# Patient Record
Sex: FEMALE | Race: BLACK OR AFRICAN AMERICAN | NOT HISPANIC OR LATINO | Employment: FULL TIME | URBAN - METROPOLITAN AREA
[De-identification: names, ages, dates, MRNs, and addresses within clinical notes are randomized per-mention and may not be internally consistent; named-entity substitution may affect disease eponyms.]

---

## 2017-01-18 ENCOUNTER — ANESTHESIA EVENT (OUTPATIENT)
Dept: ENDOSCOPY | Facility: HOSPITAL | Age: 57
End: 2017-01-18
Payer: COMMERCIAL

## 2017-01-18 ENCOUNTER — TELEPHONE (OUTPATIENT)
Dept: INTERVENTIONAL RADIOLOGY/VASCULAR | Facility: HOSPITAL | Age: 57
End: 2017-01-18

## 2017-01-18 DIAGNOSIS — Q27.30 ARTERIOVENOUS MALFORMATION: Primary | ICD-10-CM

## 2017-01-18 NOTE — TELEPHONE ENCOUNTER
Phone call (complete )   Arrival time-   530     NPO reinforced  Allergies reviewed  Directions given  Instructed to take meds in AM  Has Ride ( )             Labs (to be ordered  )      Approx recovery length discussed

## 2017-01-19 ENCOUNTER — ANESTHESIA (OUTPATIENT)
Dept: ENDOSCOPY | Facility: HOSPITAL | Age: 57
End: 2017-01-19
Payer: COMMERCIAL

## 2017-01-19 ENCOUNTER — SURGERY (OUTPATIENT)
Age: 57
End: 2017-01-19

## 2017-01-19 PROCEDURE — 25000003 PHARM REV CODE 250: Performed by: NURSE ANESTHETIST, CERTIFIED REGISTERED

## 2017-01-19 PROCEDURE — D9220A PRA ANESTHESIA: Mod: CRNA,,, | Performed by: NURSE ANESTHETIST, CERTIFIED REGISTERED

## 2017-01-19 PROCEDURE — D9220A PRA ANESTHESIA: Mod: ANES,,, | Performed by: ANESTHESIOLOGY

## 2017-01-19 PROCEDURE — 63600175 PHARM REV CODE 636 W HCPCS: Performed by: RADIOLOGY

## 2017-01-19 PROCEDURE — 63600175 PHARM REV CODE 636 W HCPCS: Performed by: NURSE ANESTHETIST, CERTIFIED REGISTERED

## 2017-01-19 RX ORDER — LIDOCAINE HCL/PF 100 MG/5ML
SYRINGE (ML) INTRAVENOUS
Status: DISCONTINUED | OUTPATIENT
Start: 2017-01-19 | End: 2017-01-19

## 2017-01-19 RX ORDER — SUCCINYLCHOLINE CHLORIDE 20 MG/ML
INJECTION INTRAMUSCULAR; INTRAVENOUS
Status: DISCONTINUED | OUTPATIENT
Start: 2017-01-19 | End: 2017-01-19

## 2017-01-19 RX ORDER — PROPOFOL 10 MG/ML
VIAL (ML) INTRAVENOUS
Status: DISCONTINUED | OUTPATIENT
Start: 2017-01-19 | End: 2017-01-19

## 2017-01-19 RX ORDER — FENTANYL CITRATE 50 UG/ML
INJECTION, SOLUTION INTRAMUSCULAR; INTRAVENOUS
Status: DISCONTINUED | OUTPATIENT
Start: 2017-01-19 | End: 2017-01-19

## 2017-01-19 RX ORDER — ONDANSETRON 2 MG/ML
INJECTION INTRAMUSCULAR; INTRAVENOUS
Status: DISCONTINUED | OUTPATIENT
Start: 2017-01-19 | End: 2017-01-19

## 2017-01-19 RX ORDER — MIDAZOLAM HYDROCHLORIDE 1 MG/ML
INJECTION, SOLUTION INTRAMUSCULAR; INTRAVENOUS
Status: DISCONTINUED | OUTPATIENT
Start: 2017-01-19 | End: 2017-01-19

## 2017-01-19 RX ORDER — DEXMEDETOMIDINE HYDROCHLORIDE 100 UG/ML
INJECTION, SOLUTION INTRAVENOUS
Status: DISCONTINUED | OUTPATIENT
Start: 2017-01-19 | End: 2017-01-19

## 2017-01-19 RX ORDER — ROCURONIUM BROMIDE 10 MG/ML
INJECTION, SOLUTION INTRAVENOUS
Status: DISCONTINUED | OUTPATIENT
Start: 2017-01-19 | End: 2017-01-19

## 2017-01-19 RX ORDER — GLYCOPYRROLATE 0.2 MG/ML
INJECTION INTRAMUSCULAR; INTRAVENOUS
Status: DISCONTINUED | OUTPATIENT
Start: 2017-01-19 | End: 2017-01-19

## 2017-01-19 RX ORDER — NEOSTIGMINE METHYLSULFATE 1 MG/ML
INJECTION, SOLUTION INTRAVENOUS
Status: DISCONTINUED | OUTPATIENT
Start: 2017-01-19 | End: 2017-01-19

## 2017-01-19 RX ORDER — PHENYLEPHRINE HYDROCHLORIDE 10 MG/ML
INJECTION INTRAVENOUS
Status: DISCONTINUED | OUTPATIENT
Start: 2017-01-19 | End: 2017-01-19

## 2017-01-19 RX ADMIN — FENTANYL CITRATE 100 MCG: 50 INJECTION, SOLUTION INTRAMUSCULAR; INTRAVENOUS at 08:01

## 2017-01-19 RX ADMIN — PHENYLEPHRINE HYDROCHLORIDE 100 MCG: 10 INJECTION INTRAVENOUS at 09:01

## 2017-01-19 RX ADMIN — ROCURONIUM BROMIDE 15 MG: 10 INJECTION, SOLUTION INTRAVENOUS at 08:01

## 2017-01-19 RX ADMIN — SUCCINYLCHOLINE CHLORIDE 140 MG: 20 INJECTION, SOLUTION INTRAMUSCULAR; INTRAVENOUS at 08:01

## 2017-01-19 RX ADMIN — LIDOCAINE HYDROCHLORIDE 100 MG: 20 INJECTION, SOLUTION INTRAVENOUS at 08:01

## 2017-01-19 RX ADMIN — PHENYLEPHRINE HYDROCHLORIDE 150 MCG: 10 INJECTION INTRAVENOUS at 08:01

## 2017-01-19 RX ADMIN — PHENYLEPHRINE HYDROCHLORIDE 100 MCG: 10 INJECTION INTRAVENOUS at 08:01

## 2017-01-19 RX ADMIN — DEXMEDETOMIDINE HYDROCHLORIDE 0.5 MCG/KG/HR: 100 INJECTION, SOLUTION, CONCENTRATE INTRAVENOUS at 08:01

## 2017-01-19 RX ADMIN — NEOSTIGMINE METHYLSULFATE 3 MG: 1 INJECTION INTRAVENOUS at 10:01

## 2017-01-19 RX ADMIN — PHENYLEPHRINE HYDROCHLORIDE 200 MCG: 10 INJECTION INTRAVENOUS at 10:01

## 2017-01-19 RX ADMIN — ONDANSETRON 4 MG: 2 INJECTION INTRAMUSCULAR; INTRAVENOUS at 10:01

## 2017-01-19 RX ADMIN — PHENYLEPHRINE HYDROCHLORIDE 200 MCG: 10 INJECTION INTRAVENOUS at 09:01

## 2017-01-19 RX ADMIN — PROPOFOL 50 MG: 10 INJECTION, EMULSION INTRAVENOUS at 08:01

## 2017-01-19 RX ADMIN — ROCURONIUM BROMIDE 5 MG: 10 INJECTION, SOLUTION INTRAVENOUS at 08:01

## 2017-01-19 RX ADMIN — GLYCOPYRROLATE 0.4 MG: 0.2 INJECTION, SOLUTION INTRAMUSCULAR; INTRAVENOUS at 10:01

## 2017-01-19 RX ADMIN — PHENYLEPHRINE HYDROCHLORIDE 200 MCG: 10 INJECTION INTRAVENOUS at 08:01

## 2017-01-19 RX ADMIN — PROPOFOL 150 MG: 10 INJECTION, EMULSION INTRAVENOUS at 08:01

## 2017-01-19 RX ADMIN — MIDAZOLAM HYDROCHLORIDE 2 MG: 1 INJECTION, SOLUTION INTRAMUSCULAR; INTRAVENOUS at 07:01

## 2017-01-19 RX ADMIN — DEXMEDETOMIDINE HYDROCHLORIDE 50 MCG: 100 INJECTION, SOLUTION, CONCENTRATE INTRAVENOUS at 08:01

## 2017-01-19 RX ADMIN — CEFTRIAXONE 1 G: 1 INJECTION, SOLUTION INTRAVENOUS at 08:01

## 2017-01-19 NOTE — ANESTHESIA RELEASE NOTE
Anesthesia Release from PACU Note    Patient: Cydney Salazar    Procedure(s) Performed: Procedure(s) (LRB):  EMBOLIZATION (N/A)    Anesthesia type: general    Post pain: Adequate analgesia    Post assessment: no apparent anesthetic complications, tolerated procedure well and no evidence of recall    Last Vitals:   Visit Vitals    BP (!) 167/87    Pulse 86    Temp 36.7 °C (98.1 °F) (Oral)    Resp 16    SpO2 (!) 94%    Breastfeeding No       Post vital signs: stable    Level of consciousness: awake, alert  and oriented    Nausea/Vomiting: no nausea/no vomiting    Complications: none    Airway Patency: patent    Respiratory: unassisted    Cardiovascular: stable and blood pressure at baseline    Hydration: euvolemic

## 2017-01-19 NOTE — ANESTHESIA POSTPROCEDURE EVALUATION
Anesthesia Post Evaluation    Patient: Cydney Salazar    Procedure(s) Performed: Procedure(s) (LRB):  EMBOLIZATION (N/A)    Final Anesthesia Type: general  Patient location during evaluation: PACU  Patient participation: Yes- Able to Participate  Level of consciousness: awake and alert  Post-procedure vital signs: reviewed and stable  Pain management: adequate  Airway patency: patent  PONV status at discharge: No PONV  Anesthetic complications: no      Cardiovascular status: hemodynamically stable  Respiratory status: unassisted, spontaneous ventilation and room air  Hydration status: euvolemic  Follow-up not needed.        Visit Vitals    BP (!) 167/87    Pulse 86    Temp 36.7 °C (98.1 °F) (Oral)    Resp 16    SpO2 (!) 94%    Breastfeeding No       Pain/Blake Score: Pain Assessment Performed: Yes (1/19/2017  2:30 PM)  Presence of Pain: denies (1/19/2017  2:30 PM)  Pain Rating Prior to Med Admin: 0 (1/19/2017  2:30 PM)  Blake Score: 10 (1/19/2017  2:30 PM)

## 2017-01-19 NOTE — ANESTHESIA PREPROCEDURE EVALUATION
01/19/2017  Cydney Salazar is a 57 y.o., female.    [facility] Anesthesia Evaluation         Review of Systems      Physical Exam  General:  Well nourished    Airway/Jaw/Neck:  Airway Findings: Mouth Opening: Normal Tongue: Normal  General Airway Assessment: Adult  Mallampati: II  Improves to II with phonation.  TM Distance: Normal, at least 6 cm      Dental:  Dental Findings: In tact   Chest/Lungs:  Chest/Lungs Findings: Clear to auscultation     Heart/Vascular:  Heart Findings: Rate: Normal  Rhythm: Regular Rhythm  Sounds: Normal        Mental Status:  Mental Status Findings:  Cooperative, Alert and Oriented         Anesthesia Plan  Type of Anesthesia, risks & benefits discussed:  Anesthesia Type:  MAC  Patient's Preference: Sedation plan explained with possible need for general discussed. Patient voiced understanding.  Intra-op Monitoring Plan:   Intra-op Monitoring Plan Comments:   Post Op Pain Control Plan:   Post Op Pain Control Plan Comments: Per primary service.   Induction:   IV  Beta Blocker:  Patient is not currently on a Beta-Blocker (No further documentation required).       Informed Consent: Patient understands risks and agrees with Anesthesia plan.  Questions answered. Anesthesia consent signed with patient.  ASA Score: 3     Day of Surgery Review of History & Physical:    H&P update referred to the surgeon.         Ready For Surgery From Anesthesia Perspective.     Patient Active Problem List   Diagnosis    Sarcoidosis    Hyperlipidemia    Obesity    Osteopenia    KAREY (obstructive sleep apnea)    Pre-diabetes    Essential hypertension    Hemoptysis    Arterio-venous malformation

## 2017-01-19 NOTE — TRANSFER OF CARE
Anesthesia Transfer of Care Note    Patient: Cydney Salazar    Procedure(s) Performed: Procedure(s) (LRB):  EMBOLIZATION (N/A)    Patient location: PACU    Anesthesia Type: general    Transport from OR: Transported from OR on 6-10 L/min O2 by face mask with adequate spontaneous ventilation    Post pain: adequate analgesia    Post assessment: no apparent anesthetic complications and tolerated procedure well    Post vital signs: stable    Level of consciousness: awake    Nausea/Vomiting: no nausea/vomiting    Complications: none          Last vitals:   Visit Vitals    BP (!) 125/58    Pulse (!) 59    Temp 35.9 °C (96.6 °F) (Axillary)    Resp 16    SpO2 100%    Breastfeeding No

## 2017-01-20 ENCOUNTER — DOCUMENTATION ONLY (OUTPATIENT)
Dept: INTERNAL MEDICINE | Facility: CLINIC | Age: 57
End: 2017-01-20

## 2017-01-20 NOTE — PROGRESS NOTES
Pre-Visit Review & Summary:    56 y/o female, with hx of HTN, Pulmonary Sarcoidosis in Remission, HPL, Obesity, Pre-Diabetes, KAREY, Osteopenia, OA Right Knee, and Glaucoma, has a scheduled appt 17 for 2 month follow up and BP check.   since her last visit, she was evaluated by Pulmonary for recurrent episodes of hemoptysis. CTA Chest 2016 revealed an AV Malformation of Right Middle Lobe. She had AV Embolization 17 without complication.     Her BP 16 was noted to be 170/90 and Losartan was increased to 100 mg/day and Dyazide was continued.      PMH:  1. HTN           Triamterene-HCT 37.5-25 mg/day           Losartan 100 mg/day    2. Hx of Pulmonary Sarcoidosis           Dx ~  with 8 yr hx of Steroid Rx - discontinued ~           Followed in Pulmonary Clinic (Dr. Aguilera) - last visit 16           Currently on no meds - in Remission           PFS (2015): Decrease Vital Capacity 57%, Decrease FEV1 60%,             CXR (2016): unchanged from , mild hilar prominence with (B) interstitial changes           CTA Chest (2016): AV Malformation Right Middle Lobe            S/P AV Embolization RML (17)    3. HPL           Lipitor 20 mg hs     4. Obesity ( BMI-33)    5. Osteopenia           BMD (2015): T Score (-)1.5    6. KAREY            CPAP           Snoring and Daytime Hypersomnolence           (+) Sleep Study 2015    7. Glaucoma    8. Seasonal Allergies           Flonase Nasal Spray prn    9. Pre-DM      10. OA Right Knee           X-Ray R-Knee (2015): moderate DJD           NSAIDS and Physical Rx recommended by Dr. Craig        MEDS:          Albuterol Inhaler prn          Clobetasol 0.05% ointment          Combigan Eye Drops          Flonase Nasal Spray          Lipitor 20 mg hs          Losartan 100 mg/day         Triamterene-HCT 37.5-25 mg/day      ALLERGIES:          NKA      Surgical Hx:                  BTL         S/P AV Embolization RML (17)    Social  Hx:          Smoking Hx: Non-Smoker          ETOH: None          Exercise: Walking 1 hour 5 days a week.            Work Hx: [employer]          Home Environment: , 1 Adult child          Postmenopausal - Sexually Active      Family Hx:           (+) Glaucoma - Mother           (+) DM - Daughter      Preventive Health Screening & Recent Lab Results:           Annual PE: 8/5/15           CBC (5/2016): normal           CMP (5/2016): normal           TSH (6/2015): 2.553           Lipids (6/2015): Chol-229, TG-170, HDL-42, LDL-153 (ASCVD Risk Score = 9.1%)                      (2/2016): Chol-131, TG-63, HDL-42, LDL-76 (on Lipitor 20 mg hs)           Hgb A1c (8/2015): 6.4                          (2/2016): 6.1            Vit D Level (8/2015): 49            GYN Exam (8/2015) - Dr. Cameron            Eye Exam (11/9/16): Glaucoma                               Mammogram (9/2016): negative            Colonoscopy ~ 2013 - [facility] - negative            Bone Density Study (5/2015): Osteopenia, T Score (-)1.5            Immunizations                        Influenza - Fall 2015                      Td - > 10 yrs ago            Other:                   Hep C Screen (8/2015): negative                   PFS (6/2015): Decrease Vital Capacity 57%, Decrease FEV1 60%,                                             Mild decrease since last study 2011                   CXR (5/2016): unchanged from 2009, mild hilar prominence with (B) interstitial changes                   (+) Sleep Study 9/2015                   X-Ray R-Knee (8/2015): moderate DJD        CTA Chest (12/2016): AV Malformation Right Middle Lobe    IMP:  1. Hx of Hemoptysis - S/P AV Embolization RML (1/19/17)  2. HTN  3. HPL  4. Pulmonary Sarcoidosis  5. Obesity  6. Pre-DM  7. Osteopenia  8. KAREY  9. OA Right Knee  10. Glaucoma

## 2017-01-25 ENCOUNTER — OFFICE VISIT (OUTPATIENT)
Dept: INTERNAL MEDICINE | Facility: CLINIC | Age: 57
End: 2017-01-25
Payer: COMMERCIAL

## 2017-01-25 VITALS
BODY MASS INDEX: 34.41 KG/M2 | WEIGHT: 206.56 LBS | SYSTOLIC BLOOD PRESSURE: 122 MMHG | DIASTOLIC BLOOD PRESSURE: 78 MMHG | HEIGHT: 65 IN | TEMPERATURE: 99 F | HEART RATE: 79 BPM

## 2017-01-25 DIAGNOSIS — E66.09 OBESITY DUE TO EXCESS CALORIES, UNSPECIFIED OBESITY SEVERITY: ICD-10-CM

## 2017-01-25 DIAGNOSIS — M85.80 OSTEOPENIA: ICD-10-CM

## 2017-01-25 DIAGNOSIS — Q27.30 ARTERIO-VENOUS MALFORMATION: ICD-10-CM

## 2017-01-25 DIAGNOSIS — D86.9 SARCOIDOSIS: ICD-10-CM

## 2017-01-25 DIAGNOSIS — R73.03 PRE-DIABETES: ICD-10-CM

## 2017-01-25 DIAGNOSIS — E78.5 HYPERLIPIDEMIA, UNSPECIFIED HYPERLIPIDEMIA TYPE: ICD-10-CM

## 2017-01-25 DIAGNOSIS — I10 ESSENTIAL HYPERTENSION: Primary | ICD-10-CM

## 2017-01-25 DIAGNOSIS — G47.33 OSA (OBSTRUCTIVE SLEEP APNEA): ICD-10-CM

## 2017-01-25 PROCEDURE — 1159F MED LIST DOCD IN RCRD: CPT | Mod: S$GLB,,, | Performed by: INTERNAL MEDICINE

## 2017-01-25 PROCEDURE — 99214 OFFICE O/P EST MOD 30 MIN: CPT | Mod: S$GLB,,, | Performed by: INTERNAL MEDICINE

## 2017-01-25 PROCEDURE — 3074F SYST BP LT 130 MM HG: CPT | Mod: S$GLB,,, | Performed by: INTERNAL MEDICINE

## 2017-01-25 PROCEDURE — 3078F DIAST BP <80 MM HG: CPT | Mod: S$GLB,,, | Performed by: INTERNAL MEDICINE

## 2017-01-25 PROCEDURE — 99999 PR PBB SHADOW E&M-EST. PATIENT-LVL III: CPT | Mod: PBBFAC,,, | Performed by: INTERNAL MEDICINE

## 2017-01-25 RX ORDER — LOSARTAN POTASSIUM 100 MG/1
100 TABLET ORAL DAILY
Qty: 90 TABLET | Refills: 3 | Status: SHIPPED | OUTPATIENT
Start: 2017-01-25 | End: 2018-01-31 | Stop reason: SDUPTHER

## 2017-01-25 NOTE — MR AVS SNAPSHOT
[facility]  [address]  [facility] [address]  Phone: [phone]  Fax: [phone]                  Cydney Salazar   2017 8:00 AM   Office Visit    Description:  Female : 1960   Provider:  Meg Quezada MD   Department:  [facility] - [facility]           Reason for Visit     Follow-up     Hypertension           Diagnoses this Visit        Comments    Essential hypertension    -  Primary     Arterio-venous malformation         Pre-diabetes         KAREY (obstructive sleep apnea)         Osteopenia         Obesity due to excess calories, unspecified obesity severity         Hyperlipidemia, unspecified hyperlipidemia type         Sarcoidosis                To Do List           Goals (5 Years of Data)     None      Follow-Up and Disposition     Return if symptoms worsen or fail to improve.       These Medications        Disp Refills Start End    losartan (COZAAR) 100 MG tablet 90 tablet 3 2017     Take 1 tablet (100 mg total) by mouth once daily. - Oral    Pharmacy: [address] #: [phone]         [facility]     [facility] On Call Nurse Care Line -  Assistance  Registered nurses in the [facility] Call Center provide clinical advisement, health education, appointment booking, and other advisory services.  Call for this free service at [phone].             Medications           Message regarding Medications     Verify the changes and/or additions to your medication regime listed below are the same as discussed with your clinician today.  If any of these changes or additions are incorrect, please notify your healthcare provider.        STOP taking these medications     azithromycin (ZITHROMAX Z-JOY) 250 MG tablet 2 tablets initially, then 1 tab daily afterward.           Verify that the below list of medications is an accurate representation of the medications you are  "currently taking.  If none reported, the list may be blank. If incorrect, please contact your healthcare provider. Carry this list with you in case of emergency.           Current Medications     albuterol (PROAIR HFA) 90 mcg/actuation HFAA Inhale into the lungs. 2 Aerosol Inhalation Every 3-4 hours    atorvastatin (LIPITOR) 40 MG tablet Take 1 tablet (40 mg total) by mouth every evening.    brimonidine-timolol (COMBIGAN) 0.2-0.5 % Drop Place 1 drop into both eyes 2 (two) times daily.    fluticasone (FLONASE) 50 mcg/actuation nasal spray 1-2 sprays @ nostril once a day    losartan (COZAAR) 100 MG tablet Take 1 tablet (100 mg total) by mouth once daily.    triamterene-hydrochlorothiazide 37.5-25 mg (MAXZIDE-25) 37.5-25 mg per tablet Take 1 tablet by mouth once daily.           Clinical Reference Information           Vital Signs - Last Recorded  Most recent update: 1/25/2017  8:10 AM by Lucina Kerns MA    BP Pulse Temp Ht Wt BMI    122/78 (BP Location: Left arm, Patient Position: Sitting, BP Method: Manual) 79 98.9 °F (37.2 °C) (Oral) 5' 5" (1.651 m) 93.7 kg (206 lb 9.1 oz) 34.38 kg/m2      Blood Pressure          Most Recent Value    BP  122/78      Allergies as of 1/25/2017     No Known Drug Allergies      Immunizations Administered on Date of Encounter - 1/25/2017     None      [facility] Sign-Up     Activating your [facility] account is as easy as 1-2-3!     1) Visit [email or web address], select Sign Up Now, enter this activation code and your date of birth, then select Next.  [email or web address]  Expires: 2/4/2017  5:51 PM      2) Create a username and password to use when you visit [facility] in the future and select a security question in case you lose your password and select Next.    3) Enter your e-mail address and click Sign Up!    Additional Information  If you have questions, please e-mail [email or web address] or call [phone] to talk to our [facility] staff. Remember, [facility] is NOT to be used for " urgent needs. For medical emergencies, dial 911.

## 2017-01-25 NOTE — PROGRESS NOTES
Subjective:       Patient ID: Cydney Salazar is a 57 y.o. female.    Chief Complaint: No chief complaint on file.    HPI Comments: 56 y/o female, with hx of HTN, Pulmonary Sarcoidosis in Remission, HPL, Obesity, Pre-Diabetes, KAREY, Osteopenia, OA Right Knee, and Glaucoma, comes in for 2 month follow up and BP check.    Since her last visit, 11/9/16. she was evaluated by Pulmonary for recurrent episodes of hemoptysis. CTA Chest 12/2016 revealed an AV Malformation of Right Middle Lobe. She had AV Embolization 1/19/17 without complication. She is doing well with no recurrence of hemoptysis since then.    Her BP 12/21/16 was noted to be 170/90 and Losartan was increased to 100 mg/day and Dyazide was continued. BP today is 122/78. Patient tolerating antihypertensive medications well.      PMH:  1. HTN           Triamterene-HCT 37.5-25 mg/day           Losartan 100 mg/day    2. Hx of Pulmonary Sarcoidosis           Dx ~ 1995 with 8 yr hx of Steroid Rx - discontinued ~2004           Followed in Pulmonary Clinic (Dr. Aguilera) - last visit 6/20/16           Currently on no meds - in Remission           PFS (6/2015): Decrease Vital Capacity 57%, Decrease FEV1 60%,             CXR (5/2016): unchanged from 2009, mild hilar prominence with (B) interstitial changes           CTA Chest (12/2016): AV Malformation Right Middle Lobe            S/P AV Embolization RML (1/19/17)    3. HPL           Lipitor 20 mg hs     4. Obesity ( BMI-33)    5. Osteopenia           BMD (5/2015): T Score (-)1.5    6. KAREY            CPAP           Snoring and Daytime Hypersomnolence           (+) Sleep Study 9/2015    7. Glaucoma    8. Seasonal Allergies           Flonase Nasal Spray prn    9. Pre-DM      10. OA Right Knee           X-Ray R-Knee (8/2015): moderate DJD           NSAIDS and Physical Rx recommended by Dr. Craig        MEDS:          Albuterol Inhaler prn          Clobetasol 0.05% ointment          Combigan Eye Drops          Flonase  Nasal Spray          Lipitor 20 mg hs          Losartan 100 mg/day         Triamterene-HCT 37.5-25 mg/day      ALLERGIES:          NKA      Surgical Hx:                  BTL         S/P AV Embolization RML (17)    Social Hx:          Smoking Hx: Non-Smoker          ETOH: None          Exercise: Walking 1 hour 5 days a week.            Work Hx: [employer]          Home Environment: , 1 Adult child          Postmenopausal - Sexually Active      Family Hx:           (+) Glaucoma - Mother           (+) DM - Daughter      Preventive Health Screening & Recent Lab Results:           Annual PE: 8/5/15           CBC (2016): normal           CMP (2016): normal           TSH (2015): 2.553           Lipids (2015): Chol-229, TG-170, HDL-42, LDL-153 (ASCVD Risk Score = 9.1%)                      (2016): Chol-131, TG-63, HDL-42, LDL-76 (on Lipitor 20 mg hs)           Hgb A1c (2015): 6.4                          (2016): 6.1            Vit D Level (2015): 49            GYN Exam (2015) - Dr. Cameron            Eye Exam (16): Glaucoma                               Mammogram (2016): negative            Colonoscopy ~  - [facility] - negative            Bone Density Study (2015): Osteopenia, T Score (-)1.5            Immunizations                        Influenza - 2015                      Td - > 10 yrs ago            Other:                   Hep C Screen (2015): negative                   PFS (2015): Decrease Vital Capacity 57%, Decrease FEV1 60%,                                             Mild decrease since last study                    CXR (2016): unchanged from 2009, mild hilar prominence with (B) interstitial changes                   (+) Sleep Study 2015                   X-Ray R-Knee (2015): moderate DJD        CTA Chest (2016): AV Malformation Right Middle Lobe          Review of Systems   Constitutional: Negative for  fever.   Respiratory: Negative for chest tightness and shortness of breath.    Cardiovascular: Negative for chest pain and leg swelling.   Gastrointestinal: Negative for abdominal pain and nausea.   Neurological: Negative for dizziness, syncope and light-headedness.       Objective:      Physical Exam   Constitutional: She is oriented to person, place, and time. She appears well-developed and well-nourished. No distress.   HENT:   Head: Normocephalic.   Cardiovascular: Normal rate and regular rhythm.  Exam reveals no gallop.    No murmur heard.  Pulmonary/Chest: Effort normal and breath sounds normal. She has no wheezes. She has no rales.   Abdominal: Soft. There is no tenderness.   Musculoskeletal: She exhibits no edema.   Neurological: She is alert and oriented to person, place, and time.   Skin: Skin is warm and dry.   Psychiatric: She has a normal mood and affect.       Assessment:   1. Hx of Hemoptysis - S/P AV Embolization RML (1/19/17) - doing well  2. HTN  3. HPL  4. Pulmonary Sarcoidosis  5. Obesity  6. Pre-DM  7. Osteopenia  8. KAREY  9. OA Right Knee  10. Glaucoma  11. Chronic Rhinitis  Plan:   1. Continue current medications.  2. Keep follow up in November for Annual PE.

## 2017-02-07 DIAGNOSIS — Q27.30 ARTERIOVENOUS MALFORMATION: Primary | ICD-10-CM

## 2017-02-07 DIAGNOSIS — R04.2 HEMOPTYSIS: ICD-10-CM

## 2017-03-01 ENCOUNTER — HOSPITAL ENCOUNTER (OUTPATIENT)
Dept: RADIOLOGY | Facility: HOSPITAL | Age: 57
Discharge: HOME | End: 2017-03-01
Attending: NURSE PRACTITIONER
Payer: COMMERCIAL

## 2017-03-01 DIAGNOSIS — Q27.30 ARTERIOVENOUS MALFORMATION: ICD-10-CM

## 2017-03-01 DIAGNOSIS — R04.2 HEMOPTYSIS: ICD-10-CM

## 2017-03-01 LAB
CREAT SERPL-MCNC: 0.8 MG/DL (ref 0.5–1.4)
SAMPLE: NORMAL

## 2017-03-01 PROCEDURE — 71275 CT ANGIOGRAPHY CHEST: CPT | Mod: 26,,, | Performed by: RADIOLOGY

## 2017-03-01 PROCEDURE — 71275 CT ANGIOGRAPHY CHEST: CPT | Mod: TC

## 2017-03-01 PROCEDURE — 25500020 PHARM REV CODE 255: Performed by: NURSE PRACTITIONER

## 2017-03-01 RX ADMIN — IOHEXOL 80 ML: 350 INJECTION, SOLUTION INTRAVENOUS at 09:03

## 2017-03-08 ENCOUNTER — OFFICE VISIT (OUTPATIENT)
Dept: INTERVENTIONAL RADIOLOGY/VASCULAR | Facility: CLINIC | Age: 57
End: 2017-03-08
Payer: COMMERCIAL

## 2017-03-08 VITALS
BODY MASS INDEX: 34.16 KG/M2 | SYSTOLIC BLOOD PRESSURE: 174 MMHG | DIASTOLIC BLOOD PRESSURE: 76 MMHG | HEIGHT: 65 IN | HEART RATE: 87 BPM | WEIGHT: 205 LBS

## 2017-03-08 DIAGNOSIS — Q27.30 ARTERIOVENOUS MALFORMATION: Primary | ICD-10-CM

## 2017-03-08 PROCEDURE — 3078F DIAST BP <80 MM HG: CPT | Mod: S$GLB,,, | Performed by: FAMILY MEDICINE

## 2017-03-08 PROCEDURE — 1160F RVW MEDS BY RX/DR IN RCRD: CPT | Mod: S$GLB,,, | Performed by: FAMILY MEDICINE

## 2017-03-08 PROCEDURE — 3077F SYST BP >= 140 MM HG: CPT | Mod: S$GLB,,, | Performed by: FAMILY MEDICINE

## 2017-03-08 PROCEDURE — 99212 OFFICE O/P EST SF 10 MIN: CPT | Mod: S$GLB,,, | Performed by: FAMILY MEDICINE

## 2017-03-08 PROCEDURE — 99999 PR PBB SHADOW E&M-EST. PATIENT-LVL III: CPT | Mod: PBBFAC,,,

## 2017-03-08 NOTE — PROGRESS NOTES
"Subjective:       Patient ID: Cydney Salazar is a 57 y.o. female.    Chief Complaint: AVM    HPI Comments: Patient here for follow up of her right lung arteriovenous malformation. She recently underwent embolization of this AVM on 1/19/2017. She reports resolution of her hemoptysis, and also reports feeling "great." She had a CTA of the chest on 3/1/2017.    Review of Systems   Constitutional: Negative for activity change, appetite change, chills, fatigue and fever.   Respiratory: Negative for cough, shortness of breath, wheezing and stridor.    Cardiovascular: Negative for chest pain, palpitations and leg swelling.       Objective:      Physical Exam   Constitutional: She is oriented to person, place, and time. She appears well-developed and well-nourished. No distress.   Cardiovascular: Normal rate, regular rhythm and normal heart sounds.  Exam reveals no gallop and no friction rub.    No murmur heard.  Pulmonary/Chest: Effort normal and breath sounds normal. No respiratory distress. She has no wheezes. She has no rales.   Neurological: She is alert and oriented to person, place, and time. Gait normal.   Skin: Skin is warm and dry. She is not diaphoretic.   Psychiatric: She has a normal mood and affect.   Vitals reviewed.      Assessment:       1. Arteriovenous malformation        Plan:         Reviewed CTA images with patient. Explained there is still a vessel that appears to be feeding her AVM. Recommendation of Dr. Fiore is to embolize this vessel. Patient is hesitant due to financial constraints. Reassured patient she can schedule when she is ready. Clinic phone number and Mery's phone number provided.    Encouraged patient to check her blood pressure at home. Explained if she is persistently >140/>90 she should contact her primary care provider. Verbalized understanding and agreement.  "

## 2017-05-22 ENCOUNTER — TELEPHONE (OUTPATIENT)
Dept: INTERNAL MEDICINE | Facility: CLINIC | Age: 57
End: 2017-05-22

## 2017-05-22 NOTE — TELEPHONE ENCOUNTER
----- Message from Alma Conrad sent at 5/22/2017 11:25 AM CDT -----  Contact: Self/[phone]  Pt states that her right hip started to bother her and she will like to see someone in regards to this.Please advise.

## 2017-05-22 NOTE — TELEPHONE ENCOUNTER
Spoke with pt. Pt states she will talk some more with her  and will contact the office to make an appointment or she will see a Dr. Cooper at [facility].

## 2017-06-06 ENCOUNTER — TELEPHONE (OUTPATIENT)
Dept: OPTOMETRY | Facility: CLINIC | Age: 57
End: 2017-06-06

## 2017-06-07 ENCOUNTER — TELEPHONE (OUTPATIENT)
Dept: INTERVENTIONAL RADIOLOGY/VASCULAR | Facility: HOSPITAL | Age: 57
End: 2017-06-07

## 2017-06-07 NOTE — TELEPHONE ENCOUNTER
Phoned patient, she is inquiring about Dr. Fiore's recommendation to repeat imaging of pulmonary AVM in 6 months. Patient had a CTA chest done in 03/2017 (3 months post embolization of AVM) and was seen in IR clinic. The CTA done on 03/01/2017 revealed: Interval partial embolization of middle lobe pulmonary AVM with continued enhancement of the multiple dilated vessels , noting a small feeding artery located anterior and superior to the embolized artery. Dr. Fiore recommended repeat embolization at that time, but patient was unable to move forward with the procedure due to financial constraints. I informed her that I will speak with Dr. Fiore to determine if he wants to move forward with the procedure now or repeat imaging prior to have the embolization done. She verbalized understanding.

## 2017-06-07 NOTE — TELEPHONE ENCOUNTER
Spoke to Dr. Fiore. He has recommended that patient proceed with embolization of additional vessel supplying the AVM. No additional imaging required prior to embolization. Phoned patient and informed her of Dr. Fiore's recommendation. She is ready to move forward with scheduling the AVM embolization. I informed her that Mery will call to schedule. EPIC message forwarded to Mery.

## 2017-06-20 DIAGNOSIS — Q27.30 ARTERIOVENOUS MALFORMATION: Primary | ICD-10-CM

## 2017-07-10 ENCOUNTER — TELEPHONE (OUTPATIENT)
Dept: OBSTETRICS AND GYNECOLOGY | Facility: CLINIC | Age: 57
End: 2017-07-10

## 2017-07-10 NOTE — TELEPHONE ENCOUNTER
----- Message from Zabrina Swain sent at 7/10/2017  2:30 PM CDT -----  Contact: self  Patient is requesting an annual visit, Patient states she can be scheduled for the next available Wednesday at anytime. Patient can be reached at [phone].

## 2017-07-11 ENCOUNTER — ANESTHESIA EVENT (OUTPATIENT)
Dept: ENDOSCOPY | Facility: HOSPITAL | Age: 57
End: 2017-07-11
Payer: COMMERCIAL

## 2017-07-11 DIAGNOSIS — Q27.30 ARTERIOVENOUS MALFORMATION: Primary | ICD-10-CM

## 2017-07-11 RX ORDER — MIDAZOLAM HYDROCHLORIDE 1 MG/ML
1 INJECTION INTRAMUSCULAR; INTRAVENOUS
Status: CANCELLED | OUTPATIENT
Start: 2017-07-11

## 2017-07-11 RX ORDER — FENTANYL CITRATE 50 UG/ML
50 INJECTION, SOLUTION INTRAMUSCULAR; INTRAVENOUS
Status: CANCELLED | OUTPATIENT
Start: 2017-07-11

## 2017-07-11 NOTE — ANESTHESIA PREPROCEDURE EVALUATION
07/11/2017      Pre-operative evaluation for Procedure(s) (LRB):  EMBOLIZATION (N/A)    Cydney Salazar is a 57 y.o. female w/ h/o KAREY, sarcoidosis, KAREY, HTN and AV malformation going for the above procedure.     Prev airway: Placement Date: 01/19/17; Placement Time: 0817; Method of Intubation: Direct laryngoscopy, Bougie Intubation; Inserted by: Anesthesia MD; Airway Device: Endotracheal Tube; Mask Ventilation: Easy - oral; Intubated: Postinduction; Blade: Walden #2; Airway Device Size: 7.5; Style: Cuffed; Cuff Inflation: Minimal occlusive pressure; Inflation Amount: 7; Placement Verified By: Auscultation, Capnometry; Grade: Grade II; Complicating Factors: Obesity, Anterior larynx; Intubation Findings: Positive EtCO2, Bilateral breath sounds, Atraumatic/Condition of teeth unchanged;  Depth of Insertion: 23; Securment: Lips; Complications: None; Breath Sounds: Equal Bilateral; Insertion Attempts: 1 (1 attempt with wand); Removal Date: 01/19/17;  Removal Time: 1032    Patient Active Problem List   Diagnosis    Sarcoidosis    Hyperlipidemia    Obesity    Osteopenia    KAREY (obstructive sleep apnea)    Pre-diabetes    Essential hypertension    Arterio-venous malformation       Review of patient's allergies indicates:   Allergen Reactions    No known drug allergies         No current facility-administered medications on file prior to encounter.      Current Outpatient Prescriptions on File Prior to Encounter   Medication Sig Dispense Refill    albuterol (PROAIR HFA) 90 mcg/actuation HFAA Inhale into the lungs. 2 Aerosol Inhalation Every 3-4 hours      atorvastatin (LIPITOR) 40 MG tablet Take 1 tablet (40 mg total) by mouth every evening. 90 tablet 3    brimonidine-timolol (COMBIGAN) 0.2-0.5 % Drop Place 1 drop into both eyes 2 (two) times daily. 5 mL 5    fluticasone (FLONASE) 50 mcg/actuation  nasal spray 1-2 sprays @ nostril once a day 1 Bottle 1    losartan (COZAAR) 100 MG tablet Take 1 tablet (100 mg total) by mouth once daily. 90 tablet 3    triamterene-hydrochlorothiazide 37.5-25 mg (MAXZIDE-25) 37.5-25 mg per tablet Take 1 tablet by mouth once daily. 90 tablet 3       Past Surgical History:   Procedure Laterality Date     SECTION         Social History     Social History    Marital status:      Spouse name: N/A    Number of children: N/A    Years of education: N/A     Occupational History    Not on file.     Social History Main Topics    Smoking status: Never Smoker    Smokeless tobacco: Never Used    Alcohol use Yes      Comment: rarely    Drug use: No    Sexual activity: Yes     Partners: Male     Birth control/ protection: Post-menopausal     Other Topics Concern    Not on file     Social History Narrative    No narrative on file         Vital Signs Range (Last 24H):               Anesthesia Evaluation    I have reviewed the Patient Summary Reports.    I have reviewed the Nursing Notes.   I have reviewed the Medications.     Review of Systems  Anesthesia Hx:  No problems with previous Anesthesia    Hematology/Oncology:  Hematology Normal   Oncology Normal     EENT/Dental:EENT/Dental Normal   Cardiovascular:   Hypertension    Pulmonary:   Sleep Apnea, CPAP Right pulmonary avm   Renal/:  Renal/ Normal     Hepatic/GI:  Hepatic/GI Normal    Musculoskeletal:  Musculoskeletal Normal    Neurological:  Neurology Normal    Endocrine:  Endocrine Normal    Dermatological:  Skin Normal    Psych:  Psychiatric Normal           Physical Exam  General:  Well nourished    Airway/Jaw/Neck:  Airway Findings: Mouth Opening: Normal Tongue: Normal  General Airway Assessment: Adult  Mallampati: III  Improves to II with phonation.  TM Distance: Normal, at least 6 cm  Jaw/Neck Findings:  Neck ROM: Normal ROM     Eyes/Ears/Nose:  Eyes/Ears/Nose Findings:    Dental:  Dental Findings: In  tact   Chest/Lungs:  Chest/Lungs Findings: Clear to auscultation, Normal Respiratory Rate     Heart/Vascular:  Heart Findings: Rate: Normal  Rhythm: Regular Rhythm  Sounds: Normal  Heart Murmur  Vascular Findings:        Mental Status:  Mental Status Findings:  Cooperative, Alert and Oriented         Anesthesia Plan  Type of Anesthesia, risks & benefits discussed:  Anesthesia Type:  general  Patient's Preference: General  Intra-op Monitoring Plan: standard ASA monitors  Intra-op Monitoring Plan Comments:   Post Op Pain Control Plan:   Post Op Pain Control Plan Comments:   Induction:   IV  Beta Blocker:         Informed Consent: Patient understands risks and agrees with Anesthesia plan.  Questions answered. Anesthesia consent signed with patient.  ASA Score: 3     Day of Surgery Review of History & Physical:    H&P update referred to the surgeon.     Anesthesia Plan Notes: Discussed plan for general endotracheal anesthesia, pt understands and agrees with plan        Ready For Surgery From Anesthesia Perspective.

## 2017-07-12 ENCOUNTER — ANESTHESIA (OUTPATIENT)
Dept: ENDOSCOPY | Facility: HOSPITAL | Age: 57
End: 2017-07-12
Payer: COMMERCIAL

## 2017-07-12 ENCOUNTER — HOSPITAL ENCOUNTER (OUTPATIENT)
Facility: HOSPITAL | Age: 57
Discharge: HOME | End: 2017-07-12
Attending: INTERNAL MEDICINE | Admitting: RADIOLOGY
Payer: COMMERCIAL

## 2017-07-12 ENCOUNTER — TELEPHONE (OUTPATIENT)
Dept: OBSTETRICS AND GYNECOLOGY | Facility: CLINIC | Age: 57
End: 2017-07-12

## 2017-07-12 VITALS
SYSTOLIC BLOOD PRESSURE: 171 MMHG | HEIGHT: 65 IN | BODY MASS INDEX: 34.16 KG/M2 | RESPIRATION RATE: 19 BRPM | HEART RATE: 84 BPM | DIASTOLIC BLOOD PRESSURE: 85 MMHG | TEMPERATURE: 99 F | WEIGHT: 205 LBS | OXYGEN SATURATION: 95 %

## 2017-07-12 DIAGNOSIS — Q27.30 ARTERIOVENOUS MALFORMATION: ICD-10-CM

## 2017-07-12 PROCEDURE — 37000008 HC ANESTHESIA 1ST 15 MINUTES

## 2017-07-12 PROCEDURE — 71000045 HC DOSC ROUTINE RECOVERY EA ADD'L HR

## 2017-07-12 PROCEDURE — 25000003 PHARM REV CODE 250: Performed by: NURSE ANESTHETIST, CERTIFIED REGISTERED

## 2017-07-12 PROCEDURE — 25500020 PHARM REV CODE 255: Performed by: INTERNAL MEDICINE

## 2017-07-12 PROCEDURE — 25000003 PHARM REV CODE 250: Performed by: FAMILY MEDICINE

## 2017-07-12 PROCEDURE — D9220A PRA ANESTHESIA: Mod: CRNA,,, | Performed by: NURSE ANESTHETIST, CERTIFIED REGISTERED

## 2017-07-12 PROCEDURE — D9220A PRA ANESTHESIA: Mod: ANES,,, | Performed by: ANESTHESIOLOGY

## 2017-07-12 PROCEDURE — 63600175 PHARM REV CODE 636 W HCPCS: Performed by: NURSE ANESTHETIST, CERTIFIED REGISTERED

## 2017-07-12 PROCEDURE — 71000044 HC DOSC ROUTINE RECOVERY FIRST HOUR

## 2017-07-12 PROCEDURE — 37000009 HC ANESTHESIA EA ADD 15 MINS

## 2017-07-12 RX ORDER — GLYCOPYRROLATE 0.2 MG/ML
INJECTION INTRAMUSCULAR; INTRAVENOUS
Status: DISCONTINUED | OUTPATIENT
Start: 2017-07-12 | End: 2017-07-12

## 2017-07-12 RX ORDER — NEOSTIGMINE METHYLSULFATE 1 MG/ML
INJECTION, SOLUTION INTRAVENOUS
Status: DISCONTINUED | OUTPATIENT
Start: 2017-07-12 | End: 2017-07-12

## 2017-07-12 RX ORDER — PROPOFOL 10 MG/ML
VIAL (ML) INTRAVENOUS
Status: DISCONTINUED | OUTPATIENT
Start: 2017-07-12 | End: 2017-07-12

## 2017-07-12 RX ORDER — ONDANSETRON 2 MG/ML
INJECTION INTRAMUSCULAR; INTRAVENOUS
Status: DISCONTINUED | OUTPATIENT
Start: 2017-07-12 | End: 2017-07-12

## 2017-07-12 RX ORDER — PHENYLEPHRINE HYDROCHLORIDE 10 MG/ML
INJECTION INTRAVENOUS
Status: DISCONTINUED | OUTPATIENT
Start: 2017-07-12 | End: 2017-07-12

## 2017-07-12 RX ORDER — LIDOCAINE HCL/PF 100 MG/5ML
SYRINGE (ML) INTRAVENOUS
Status: DISCONTINUED | OUTPATIENT
Start: 2017-07-12 | End: 2017-07-12

## 2017-07-12 RX ORDER — ROCURONIUM BROMIDE 10 MG/ML
INJECTION, SOLUTION INTRAVENOUS
Status: DISCONTINUED | OUTPATIENT
Start: 2017-07-12 | End: 2017-07-12

## 2017-07-12 RX ORDER — FENTANYL CITRATE 50 UG/ML
INJECTION, SOLUTION INTRAMUSCULAR; INTRAVENOUS
Status: DISCONTINUED | OUTPATIENT
Start: 2017-07-12 | End: 2017-07-12

## 2017-07-12 RX ORDER — SUCCINYLCHOLINE CHLORIDE 20 MG/ML
INJECTION INTRAMUSCULAR; INTRAVENOUS
Status: DISCONTINUED | OUTPATIENT
Start: 2017-07-12 | End: 2017-07-12

## 2017-07-12 RX ORDER — SODIUM CHLORIDE 9 MG/ML
INJECTION, SOLUTION INTRAVENOUS CONTINUOUS
Status: ACTIVE | OUTPATIENT
Start: 2017-07-12 | End: 2017-07-12

## 2017-07-12 RX ORDER — FENTANYL CITRATE 50 UG/ML
25 INJECTION, SOLUTION INTRAMUSCULAR; INTRAVENOUS EVERY 5 MIN PRN
Status: DISCONTINUED | OUTPATIENT
Start: 2017-07-12 | End: 2017-07-12 | Stop reason: HOSPADM

## 2017-07-12 RX ORDER — MIDAZOLAM HYDROCHLORIDE 1 MG/ML
INJECTION, SOLUTION INTRAMUSCULAR; INTRAVENOUS
Status: DISCONTINUED | OUTPATIENT
Start: 2017-07-12 | End: 2017-07-12

## 2017-07-12 RX ORDER — SODIUM CHLORIDE 9 MG/ML
500 INJECTION, SOLUTION INTRAVENOUS ONCE
Status: COMPLETED | OUTPATIENT
Start: 2017-07-12 | End: 2017-07-12

## 2017-07-12 RX ORDER — SODIUM CHLORIDE 0.9 % (FLUSH) 0.9 %
3 SYRINGE (ML) INJECTION
Status: DISCONTINUED | OUTPATIENT
Start: 2017-07-12 | End: 2017-07-12 | Stop reason: HOSPADM

## 2017-07-12 RX ORDER — SODIUM CHLORIDE 9 MG/ML
INJECTION, SOLUTION INTRAVENOUS CONTINUOUS PRN
Status: DISCONTINUED | OUTPATIENT
Start: 2017-07-12 | End: 2017-07-12

## 2017-07-12 RX ORDER — SODIUM CHLORIDE 9 MG/ML
INJECTION, SOLUTION INTRAVENOUS CONTINUOUS
Status: DISCONTINUED | OUTPATIENT
Start: 2017-07-12 | End: 2017-07-12 | Stop reason: HOSPADM

## 2017-07-12 RX ADMIN — FENTANYL CITRATE 50 MCG: 50 INJECTION, SOLUTION INTRAMUSCULAR; INTRAVENOUS at 02:07

## 2017-07-12 RX ADMIN — SODIUM CHLORIDE, SODIUM GLUCONATE, SODIUM ACETATE, POTASSIUM CHLORIDE, MAGNESIUM CHLORIDE, SODIUM PHOSPHATE, DIBASIC, AND POTASSIUM PHOSPHATE: .53; .5; .37; .037; .03; .012; .00082 INJECTION, SOLUTION INTRAVENOUS at 02:07

## 2017-07-12 RX ADMIN — PHENYLEPHRINE HYDROCHLORIDE 200 MCG: 10 INJECTION INTRAVENOUS at 02:07

## 2017-07-12 RX ADMIN — PHENYLEPHRINE HYDROCHLORIDE 100 MCG: 10 INJECTION INTRAVENOUS at 03:07

## 2017-07-12 RX ADMIN — LIDOCAINE HYDROCHLORIDE 100 MG: 20 INJECTION, SOLUTION INTRAVENOUS at 02:07

## 2017-07-12 RX ADMIN — SUCCINYLCHOLINE CHLORIDE 120 MG: 20 INJECTION, SOLUTION INTRAMUSCULAR; INTRAVENOUS at 02:07

## 2017-07-12 RX ADMIN — PHENYLEPHRINE HYDROCHLORIDE 100 MCG: 10 INJECTION INTRAVENOUS at 02:07

## 2017-07-12 RX ADMIN — IOHEXOL 225 ML: 300 INJECTION, SOLUTION INTRAVENOUS at 04:07

## 2017-07-12 RX ADMIN — SODIUM CHLORIDE 500 ML: 0.9 INJECTION, SOLUTION INTRAVENOUS at 01:07

## 2017-07-12 RX ADMIN — MIDAZOLAM HYDROCHLORIDE 2 MG: 1 INJECTION, SOLUTION INTRAMUSCULAR; INTRAVENOUS at 01:07

## 2017-07-12 RX ADMIN — PROPOFOL 200 MG: 10 INJECTION, EMULSION INTRAVENOUS at 02:07

## 2017-07-12 RX ADMIN — NEOSTIGMINE METHYLSULFATE 4 MG: 1 INJECTION INTRAVENOUS at 04:07

## 2017-07-12 RX ADMIN — ONDANSETRON 4 MG: 2 INJECTION INTRAMUSCULAR; INTRAVENOUS at 04:07

## 2017-07-12 RX ADMIN — ROCURONIUM BROMIDE 5 MG: 10 INJECTION, SOLUTION INTRAVENOUS at 02:07

## 2017-07-12 RX ADMIN — ROCURONIUM BROMIDE 10 MG: 10 INJECTION, SOLUTION INTRAVENOUS at 03:07

## 2017-07-12 RX ADMIN — SODIUM CHLORIDE: 0.9 INJECTION, SOLUTION INTRAVENOUS at 01:07

## 2017-07-12 RX ADMIN — ROCURONIUM BROMIDE 15 MG: 10 INJECTION, SOLUTION INTRAVENOUS at 02:07

## 2017-07-12 RX ADMIN — SUCCINYLCHOLINE CHLORIDE 80 MG: 20 INJECTION, SOLUTION INTRAMUSCULAR; INTRAVENOUS at 02:07

## 2017-07-12 RX ADMIN — ROCURONIUM BROMIDE 10 MG: 10 INJECTION, SOLUTION INTRAVENOUS at 02:07

## 2017-07-12 RX ADMIN — GLYCOPYRROLATE 0.6 MG: 0.2 INJECTION, SOLUTION INTRAMUSCULAR; INTRAVENOUS at 04:07

## 2017-07-12 RX ADMIN — PROPOFOL 50 MG: 10 INJECTION, EMULSION INTRAVENOUS at 02:07

## 2017-07-12 NOTE — PROGRESS NOTES
Page returned by Dr. Brunner regarding neurovascular checks for pt s/p embolization. Informed MD that pt has been in recovery since 1723 and RN has been performing neurovascular checks q15min but inquiring frequency. Informed MD that pt has been lying flat since 1700 and bedrest ordered for 2 hours. Informed MD that pt's neurovascular checks have been intact, pt remain in phase 1 of recovery d/t arousability, pt responds to voice and pt requiring oxygen, currently at 0.5LPM with O2 sats on 92%-95%. MD verbalized acknowledgement. MD states to stop neurovascular checks. Will continue to monitor and stop neurovascular checks per MD.

## 2017-07-12 NOTE — PROGRESS NOTES
Pt's family now present at bedside. Pt's  with questions about pt's procedure wondering if a doctor will update on procedure. Pt requesting to to speak to a doctor regarding her procedure. Call placed to Dr Brunner informing MD of this. MD states he will look into seeing if docs known to pt and procedure are still here and will call me back. Will continue to monitor.

## 2017-07-12 NOTE — PROGRESS NOTES
Hemostasis achieved to right venous accessed by use of manual pressure. Patient to remain flat until 1822.

## 2017-07-12 NOTE — H&P
Radiology History & Physical      SUBJECTIVE:     Chief Complaint: pulmonary AVM    History of Present Illness:  Cydney Salazar is a 57 y.o. female h/o sarcoidosis and RML pulmonary AVM s/p embolization x1 who presents for retreatment.  Past Medical History:   Diagnosis Date    Arteriovenous malformation (AVM)     Cataract     Glaucoma     Hypertension     Sarcoidosis     in remission    Sleep apnea     uses cpap     Past Surgical History:   Procedure Laterality Date     SECTION         Home Meds:   Prior to Admission medications    Medication Sig Start Date End Date Taking? Authorizing Provider   albuterol (PROAIR HFA) 90 mcg/actuation HFAA Inhale into the lungs. 2 Aerosol Inhalation Every 3-4 hours   Yes Historical Provider, MD   atorvastatin (LIPITOR) 40 MG tablet Take 1 tablet (40 mg total) by mouth every evening. 16  Yes Meg Quezada MD   brimonidine-timolol (COMBIGAN) 0.2-0.5 % Drop Place 1 drop into both eyes 2 (two) times daily. 16  Yes Darriusvanesa Romero, ALISSA   fluticasone (FLONASE) 50 mcg/actuation nasal spray 1-2 sprays @ nostril once a day 14  Yes Gaurav Randhawa III, MD   losartan (COZAAR) 100 MG tablet Take 1 tablet (100 mg total) by mouth once daily. 17  Yes Meg Quezada MD   triamterene-hydrochlorothiazide 37.5-25 mg (MAXZIDE-25) 37.5-25 mg per tablet Take 1 tablet by mouth once daily. 16  Yes Meg Quezada MD     Anticoagulants/Antiplatelets: no anticoagulation    Allergies:   Review of patient's allergies indicates:   Allergen Reactions    No known drug allergies      Sedation History:  no adverse reactions    Review of Systems:   Hematological: no known coagulopathies  Respiratory: no shortness of breath  Cardiovascular: no chest pain  Gastrointestinal: no abdominal pain  Genito-Urinary: no dysuria  Musculoskeletal: intermittent right thoracic posterior backpain  Neurological: no TIA or stroke symptoms         OBJECTIVE:      Vital Signs (Most Recent)  Temp: 98.5 °F (36.9 °C) (07/12/17 1239)  Pulse: 73 (07/12/17 1239)  Resp: 18 (07/12/17 1239)  BP: (!) 153/73 (07/12/17 1256)  SpO2: 100 % (07/12/17 1239)    Physical Exam:  ASA: 3  Mallampati: 2    General: no acute distress  Mental Status: alert and oriented to person, place and time  HEENT: normocephalic, atraumatic  Chest: unlabored breathing  Heart: regular heart rate  Abdomen: nondistended  Extremity: moves all extremities    Laboratory  Lab Results   Component Value Date    INR 1.0 07/12/2017       Lab Results   Component Value Date    WBC 4.84 07/12/2017    HGB 12.3 07/12/2017    HCT 38.0 07/12/2017    MCV 88 07/12/2017     07/12/2017      Lab Results   Component Value Date     11/09/2016     11/09/2016    K 4.7 11/09/2016     11/09/2016    CO2 32 (H) 11/09/2016    BUN 15 11/09/2016    CREATININE 0.9 11/09/2016    CALCIUM 9.7 11/09/2016    ALT 9 (L) 11/09/2016    AST 16 11/09/2016    ALBUMIN 3.9 11/09/2016    BILITOT 0.4 11/09/2016       ASSESSMENT/PLAN:     Sedation Plan: per anesthesia  Patient will undergo pulmonary angiogram and pulmonary AVM embolization.    Luis F Stanley  Radiology PGY-4

## 2017-07-12 NOTE — PROCEDURES
Radiology Post-Procedure Note    Pre Op Diagnosis: Pulmonary AVM    Post Op Diagnosis: : SAME    Procedure: pulmonary AVM embolization    Procedure performed by: Geovanny Barragan MD; MD Chelsea; MD Jules    Written Informed Consent Obtained: Yes    Specimen Removed: NO    Estimated Blood Loss: Minimal    Findings: After written informed consent and sterile prep and drape, the right femoral vein was cannulated and catheter was advancedinto the right pulmonary artery.  Contrast injection under fluoroscopy revealed AVM with multiple arterial feeders from distal right pulmonary. Microcatheter was advanced into more distal feeding branch and a Chey coil (3mm;15 cm) was used to embolize the artery. Subsequent injection showed successful embolization of this vessel.    Another feeder was identified post embolization. Embolization of that artery may be obtain at a later time.    Catheter and sheath were removed and compression was used for hemostasis.   Please see full procedural report in Imaging for further details.       Patient was intubated during the procedure, managed by anaesthesia.    Barbara Vicente M.D. 4:36 PM 7/12/2017

## 2017-07-12 NOTE — DISCHARGE SUMMARY
Radiology Discharge Summary      Hospital Course: No complications    Admit Date: 7/12/2017  Discharge Date: 07/12/2017     Instructions Given to Patient: Yes  Diet: Resume prior diet  Activity: activity as tolerated    Description of Condition on Discharge: Stable  Vital Signs (Most Recent): Temp: 98.5 °F (36.9 °C) (07/12/17 1239)  Pulse: 73 (07/12/17 1239)  Resp: 18 (07/12/17 1239)  BP: (!) 153/73 (07/12/17 1256)  SpO2: 100 % (07/12/17 1239)    Discharge Disposition: Home    Discharge Diagnosis: Pulmonary AVM    Pt underwent distal right pulmonary artery embolization for pulmonary AVM. Pt tolerated the procedure well.     Follow-up: Pt is to be returned in 1-2 months for further embolization of other feeding artery.

## 2017-07-12 NOTE — PROGRESS NOTES
Dr. Barragan present at bedside talking with pt and pt's family. MD states to allow the leftover 500cc bag of IVFs hanging to infuse wide open. MD inquired if pt voided. Pt stated that she void right after procedure. Informed MD that pt's pressure has been SBP ranged 150 -160s with DBP 70s/80s. MD verbalized acknowledgement and states ok for pt to get the IVFs wide open. Will unclamp IVFs and continue to monitor.

## 2017-07-12 NOTE — TELEPHONE ENCOUNTER
----- Message from Zabrina Swain sent at 7/12/2017  8:30 AM CDT -----  Contact: self  Patient is returning a missed call and can be reached at [phone].

## 2017-07-12 NOTE — PROGRESS NOTES
Patient transferred to IR room 189 for RML pulmonary AVM s/p embolization. Patient left under care of CRNA

## 2017-07-12 NOTE — TRANSFER OF CARE
"Anesthesia Transfer of Care Note    Patient: Cydney Salazar    Procedure(s) Performed: Procedure(s) (LRB):  EMBOLIZATION (N/A)    Patient location: [facility]    Anesthesia Type: general    Transport from OR: Transported from OR on room air with adequate spontaneous ventilation    Post pain: adequate analgesia    Post assessment: no apparent anesthetic complications and tolerated procedure well    Post vital signs: stable    Level of consciousness: awake, alert and oriented    Nausea/Vomiting: no nausea/vomiting    Complications: none    Transfer of care protocol was followed      Last vitals:   Visit Vitals  BP (!) 153/73   Pulse 73   Temp 36.9 °C (98.5 °F) (Oral)   Resp 18   Ht 5' 5" (1.651 m)   Wt 93 kg (205 lb)   SpO2 100%   BMI 34.11 kg/m²     "

## 2017-07-13 DIAGNOSIS — Q27.30 ARTERIOVENOUS MALFORMATION: Primary | ICD-10-CM

## 2017-07-13 RX ORDER — SODIUM CHLORIDE 0.9 % (FLUSH) 0.9 %
3 SYRINGE (ML) INJECTION
Status: CANCELLED | OUTPATIENT
Start: 2017-07-13

## 2017-07-13 NOTE — DISCHARGE INSTRUCTIONS
Back at home  On the day you get home, dont drive, dont exercise, avoid walking and taking stairs, and avoid bending and lifting. Your healthcare provider may give you other care instructions.    Call your healthcare provider  Call your healthcare provider right away if:  · You notice a lump or bleeding at the insertion site  · You feel pain at the insertion site  · You become lightheaded or dizzy  · You have leg pain or numbness  · You do not urinate in 8 hours    Date Last Reviewed: 5/1/2016 © 2000-2016 [employer]. [address] [place] [id number]. All rights reserved. This information is not intended as a substitute for professional medical care. Always follow your healthcare professional's instructions.      Anesthesia: General Anesthesia  Youre due to have surgery. During surgery, youll be given medication called anesthesia. (It is also called anesthetic.) This will keep you comfortable and pain-free. Your anesthesia provider will use general anesthesia. This sheet tells you more about it.  What is general anesthesia?     You are watched continuously during your procedure by the anesthesia provider   General anesthesia puts you into a state like deep sleep. It goes into the bloodstream (IV anesthetics), into the lungs (gas anesthetics), or both. You feel nothing during the procedure. You will not remember it. During the procedure, the anesthesia provider monitors you continuously. He or she checks your heart rate and rhythm, blood pressure, breathing, and blood oxygen.  · IV Anesthetics. IV anesthetics are given through an IV line in your arm. Theyre often given first. This is so you are asleep before a gas anesthetic is started. Some kinds of IV anesthetics relieve pain. Others relax you. Your doctor will decide which kind is best in your case.  · Gas Anesthetics. Gas anesthetics are breathed into the lungs. They are often used to keep you asleep. They can be given through a  facemask or a tube placed in your larynx or trachea (breathing tube).  ¨ If you have a facemask, your anesthesia provider will most likely place it over your nose and mouth while youre still awake. Youll breathe oxygen through the mask as your IV anesthetic is started. Gas anesthetic may be added through the mask.  ¨ If you have a tube in the larynx or trachea, it will be inserted into your throat after youre asleep.  Anesthesia tools and medications  You will likely have:  · IV anesthetics. These are put into an IV line into your bloodstream.  · Gas anesthetics. You breathe these anesthetics into your lungs, where they pass into your bloodstream.  · Pulse oximeter. This is a small clip that is attached to the end of your finger. This measures your blood oxygen level.  · Electrocardiography leads (electrodes). These are small sticky pads that are placed on your chest. They record your heart rate and rhythm.  · Blood pressure cuff. This reads your blood pressure.  Risks and possible complications  General anesthesia has some risks. These include:  · Breathing problems  · Nausea and vomiting  · Sore throat or hoarseness (usually temporary)  · Allergic reaction to the anesthetic  · Irregular heartbeat (rare)  · Cardiac arrest (rare)   Anesthesia safety  · Follow all instructions you are given for how long not to eat or drink before your procedure.  · Be sure your doctor knows what medications and drugs you take. This includes over-the-counter medications, herbs, supplements, alcohol or other drugs. You will be asked when those were last taken.  · Have an adult family member or friend drive you home after the procedure.  · For the first 24 hours after your surgery:  ¨ Do not drive or use heavy equipment.  ¨ Have a trusted family member or spouse make important decisions or sign documents.  ¨ Avoid alcohol.  ¨ Have a responsible adult stay with you. He or she can watch for problems and help keep you safe.  Date Last  Reviewed: 10/16/2014  © 4526-5148 [employer]. [address] [place] [address], [place] [address]. All rights reserved. This information is not intended as a substitute for professional medical care. Always follow your healthcare professional's instructions.

## 2017-07-13 NOTE — PLAN OF CARE
Discharge instructions provided to pt. Pt verbalized understanding. Consents in chart. Vital signs stable. Pt denies any nausea, vomiting or pain. No complaints or distress noted.

## 2017-07-13 NOTE — ANESTHESIA POSTPROCEDURE EVALUATION
"Anesthesia Post Evaluation    Patient: Cydney Salazar    Procedure(s) Performed: Procedure(s) (LRB):  EMBOLIZATION (N/A)    Final Anesthesia Type: general  Patient location during evaluation: PACU  Patient participation: Yes- Able to Participate  Level of consciousness: awake and alert  Pain management: adequate  Airway patency: patent  PONV status at discharge: No PONV  Anesthetic complications: no      Cardiovascular status: blood pressure returned to baseline  Respiratory status: unassisted, spontaneous ventilation and room air  Hydration status: euvolemic  Follow-up not needed.        Visit Vitals  BP (!) 171/85 (BP Location: Right arm, Patient Position: Lying, BP Method: Automatic)   Pulse 84   Temp 37 °C (98.6 °F) (Temporal)   Resp 19   Ht 5' 5" (1.651 m)   Wt 93 kg (205 lb)   SpO2 95%   BMI 34.11 kg/m²       Pain/Blake Score: Pain Assessment Performed: Yes (7/12/2017  7:30 PM)  Presence of Pain: denies (7/12/2017  7:30 PM)  Pain Rating Prior to Med Admin: 0 (7/12/2017 12:56 PM)  Blake Score: 10 (7/12/2017  7:30 PM)      "

## 2017-07-14 ENCOUNTER — TELEPHONE (OUTPATIENT)
Dept: OBSTETRICS AND GYNECOLOGY | Facility: CLINIC | Age: 57
End: 2017-07-14

## 2017-07-14 NOTE — TELEPHONE ENCOUNTER
Returned pt call regarding scheduling her annual exam.  Informed pt of appt date and time.  Pt verbalized understanding.  Letter sent out.

## 2017-07-14 NOTE — TELEPHONE ENCOUNTER
----- Message from Monica Mccrary sent at 7/14/2017 11:58 AM CDT -----  Contact: self  Pt needing a call back, she have questions and can be reached at [phone].

## 2017-09-13 ENCOUNTER — OFFICE VISIT (OUTPATIENT)
Dept: OBSTETRICS AND GYNECOLOGY | Facility: CLINIC | Age: 57
End: 2017-09-13
Attending: OBSTETRICS & GYNECOLOGY
Payer: COMMERCIAL

## 2017-09-13 VITALS
SYSTOLIC BLOOD PRESSURE: 120 MMHG | HEIGHT: 65 IN | WEIGHT: 211.63 LBS | BODY MASS INDEX: 35.26 KG/M2 | DIASTOLIC BLOOD PRESSURE: 80 MMHG

## 2017-09-13 DIAGNOSIS — Z01.419 VISIT FOR GYNECOLOGIC EXAMINATION: Primary | ICD-10-CM

## 2017-09-13 DIAGNOSIS — Z12.31 ENCOUNTER FOR SCREENING MAMMOGRAM FOR BREAST CANCER: ICD-10-CM

## 2017-09-13 PROCEDURE — 99999 PR PBB SHADOW E&M-EST. PATIENT-LVL III: CPT | Mod: PBBFAC,,, | Performed by: OBSTETRICS & GYNECOLOGY

## 2017-09-13 PROCEDURE — 99396 PREV VISIT EST AGE 40-64: CPT | Mod: S$GLB,,, | Performed by: OBSTETRICS & GYNECOLOGY

## 2017-09-13 NOTE — PROGRESS NOTES
"CC: Well woman exam    Cydney Salazar is a 57 y.o. female  presents for a well woman exam.  LMP: No LMP recorded. Patient is postmenopausal..  No issues, problems, or complaints.    Past Medical History:   Diagnosis Date    Arteriovenous malformation (AVM)     Cataract     Glaucoma     Hypertension     Sarcoidosis     in remission    Sleep apnea     uses cpap     Past Surgical History:   Procedure Laterality Date     SECTION       Social History     Social History    Marital status:      Spouse name: N/A    Number of children: N/A    Years of education: N/A     Social History Main Topics    Smoking status: Never Smoker    Smokeless tobacco: Never Used    Alcohol use Yes      Comment: rarely    Drug use: No    Sexual activity: Yes     Partners: Male     Birth control/ protection: Post-menopausal     Other Topics Concern    None     Social History Narrative    None     Family History   Problem Relation Age of Onset    Glaucoma Mother     Diabetes Daughter     No Known Problems Father     No Known Problems Sister     No Known Problems Brother     No Known Problems Maternal Aunt     No Known Problems Maternal Uncle     No Known Problems Paternal Aunt     No Known Problems Paternal Uncle     No Known Problems Maternal Grandmother     No Known Problems Maternal Grandfather     No Known Problems Paternal Grandmother     No Known Problems Paternal Grandfather     Amblyopia Neg Hx     Blindness Neg Hx     Cancer Neg Hx     Cataracts Neg Hx     Hypertension Neg Hx     Macular degeneration Neg Hx     Retinal detachment Neg Hx     Strabismus Neg Hx     Stroke Neg Hx     Thyroid disease Neg Hx     Breast cancer Neg Hx     Colon cancer Neg Hx     Ovarian cancer Neg Hx      OB History      Para Term  AB Living    1 1       1    SAB TAB Ectopic Multiple Live Births            1          /80   Ht 5' 5" (1.651 m)   Wt 96 kg (211 lb 10.3 oz)   " BMI 35.22 kg/m²       ROS:    ROS:  GENERAL: Denies weight gain or weight loss. Feeling well overall.   SKIN: Denies rash or lesions.   HEAD: Denies head injury or headache.   NODES: Denies enlarged lymph nodes.   CHEST: Denies chest pain or shortness of breath.   CARDIOVASCULAR: Denies palpitations or left sided chest pain.   ABDOMEN: No abdominal pain, constipation, diarrhea, nausea, vomiting or rectal bleeding.   URINARY: No frequency, dysuria, hematuria, or burning on urination.  REPRODUCTIVE: See HPI.   BREASTS: The patient performs breast self-examination and denies pain, lumps, or nipple discharge.   HEMATOLOGIC: No easy bruisability or excessive bleeding.   MUSCULOSKELETAL: Denies joint pain or swelling.   NEUROLOGIC: Denies syncope or weakness.   PSYCHIATRIC: Denies depression, anxiety or mood swings.    PHYSICAL EXAM:    APPEARANCE: Well nourished, well developed, in no acute distress.  AFFECT: WNL, alert and oriented x 3  SKIN: No acne or hirsutism  NECK: Neck symmetric without masses or thyromegaly  NODES: No inguinal, cervical, axillary, or femoral lymph node enlargement  CHEST: Good respiratory effect  ABDOMEN: Soft.  No tenderness or masses.  No hepatosplenomegaly.  No hernias.  BREASTS: Symmetrical, no skin changes or visible lesions.  No palpable masses, nipple discharge bilaterally.  PELVIC: Normal external genitalia without lesions.  Normal hair distribution.  Adequate perineal body, normal urethral meatus.  Vagina atrophic without lesions or discharge.  Cervix pink, without lesions, discharge or tenderness.  No significant cystocele or rectocele.  Bimanual exam shows uterus to be normal size, regular, mobile and nontender.  Adnexa without masses or tenderness.    RECTAL: Rectovaginal exam confirms above with normal sphincter tone, no masses.  EXTREMITIES: No edema.      ICD-10-CM ICD-9-CM    1. Visit for gynecologic examination Z01.419 V72.31    2. Encounter for screening mammogram for breast  cancer Z12.31 V76.12 Mammo Digital Screening Bilat with CAD         Patient was counseled today on A.C.S. Pap guidelines and recommendations for yearly pelvic exams, pap smears every 3 years, mammograms and monthly self breast exams; to see her PCP for other health maintenance.     Return in about 1 year (around 9/13/2018).

## 2017-10-04 ENCOUNTER — HOSPITAL ENCOUNTER (OUTPATIENT)
Dept: RADIOLOGY | Facility: HOSPITAL | Age: 57
Discharge: HOME | End: 2017-10-04
Attending: OBSTETRICS & GYNECOLOGY
Payer: COMMERCIAL

## 2017-10-04 VITALS — BODY MASS INDEX: 35.16 KG/M2 | HEIGHT: 65 IN | WEIGHT: 211 LBS

## 2017-10-04 DIAGNOSIS — Z12.31 ENCOUNTER FOR SCREENING MAMMOGRAM FOR BREAST CANCER: ICD-10-CM

## 2017-10-04 PROCEDURE — 77063 BREAST TOMOSYNTHESIS BI: CPT | Mod: 26,,, | Performed by: RADIOLOGY

## 2017-10-04 PROCEDURE — 77067 SCR MAMMO BI INCL CAD: CPT | Mod: 26,,, | Performed by: RADIOLOGY

## 2017-10-04 PROCEDURE — 77067 SCR MAMMO BI INCL CAD: CPT | Mod: TC

## 2017-10-09 ENCOUNTER — TELEPHONE (OUTPATIENT)
Dept: OBSTETRICS AND GYNECOLOGY | Facility: CLINIC | Age: 57
End: 2017-10-09

## 2017-10-09 NOTE — TELEPHONE ENCOUNTER
----- Message from Monica Mccrary sent at 10/9/2017  9:09 AM CDT -----  Contact: self  Pt returning a missed call, she can be reached at [phone].

## 2017-11-22 ENCOUNTER — CLINICAL SUPPORT (OUTPATIENT)
Dept: OPHTHALMOLOGY | Facility: CLINIC | Age: 57
End: 2017-11-22
Payer: COMMERCIAL

## 2017-11-22 ENCOUNTER — OFFICE VISIT (OUTPATIENT)
Dept: OPTOMETRY | Facility: CLINIC | Age: 57
End: 2017-11-22
Payer: COMMERCIAL

## 2017-11-22 DIAGNOSIS — H25.13 NUCLEAR SCLEROSIS OF BOTH EYES: ICD-10-CM

## 2017-11-22 DIAGNOSIS — H40.1131 PRIMARY OPEN ANGLE GLAUCOMA OF BOTH EYES, MILD STAGE: ICD-10-CM

## 2017-11-22 DIAGNOSIS — I10 ESSENTIAL HYPERTENSION: ICD-10-CM

## 2017-11-22 DIAGNOSIS — H52.03 HYPEROPIA WITH PRESBYOPIA OF BOTH EYES: ICD-10-CM

## 2017-11-22 DIAGNOSIS — Z13.5 SCREENING FOR EYE CONDITION: ICD-10-CM

## 2017-11-22 DIAGNOSIS — D86.9 SARCOIDOSIS: ICD-10-CM

## 2017-11-22 DIAGNOSIS — H52.4 HYPEROPIA WITH PRESBYOPIA OF BOTH EYES: ICD-10-CM

## 2017-11-22 DIAGNOSIS — H40.053 OCULAR HYPERTENSION, BILATERAL: Primary | ICD-10-CM

## 2017-11-22 PROCEDURE — 92014 COMPRE OPH EXAM EST PT 1/>: CPT | Mod: S$GLB,,, | Performed by: OPTOMETRIST

## 2017-11-22 PROCEDURE — 92083 EXTENDED VISUAL FIELD XM: CPT | Mod: S$GLB,,, | Performed by: OPTOMETRIST

## 2017-11-22 PROCEDURE — 99999 PR PBB SHADOW E&M-EST. PATIENT-LVL II: CPT | Mod: PBBFAC,,, | Performed by: OPTOMETRIST

## 2017-11-22 PROCEDURE — 92015 DETERMINE REFRACTIVE STATE: CPT | Mod: S$GLB,,, | Performed by: OPTOMETRIST

## 2017-11-22 NOTE — PATIENT INSTRUCTIONS
Prior diagnosis of bilateral ocular hypertension.  Currently using Combigan Ophthalmic Solution two times per day in both eyes for IOP control.  Intraocular pressure within normal range in each eye.  Repeat HVF test done today.    Reviewed results and discussed with Mrs. Salazar.   Results suggestive of early stage glaucoma in each eye.  Continue to instill Combigan Ophthalmic Solution (one drop two times per day) in both eyes.   Early nuclear sclerosis in both eyes.  Hyperopia in each eye, with good correctable VA in each eye.  Presbyopia consistent with age.   New spectacle lens Rx issued for use as desired.  Recheck in one year, with repeat HVF test (24-2 RORY Standard) at that visit.

## 2017-11-22 NOTE — PROGRESS NOTES
HPI     Ocular Hypertension    Additional comments: Follow up on ocular hypertension.   Using drops for IOP control in both eyes.  Repeat HVF test (24-2 RORY Standard) done today.   History of sarcoidosis.  Being monitored.            Comments   Patient's age: 57 y.o. female  Occupation: [employer]   Approximate date of last eye examination:  11/09/16  Name of last eye doctor seen: Dr Romero   City/State: [facility]   Wears glasses? No     If yes, wears  Full-time or part-time?  Part time OTC readers  Present glasses are:   SV OTC Readers   Approximate age of present glasses:  n/a  Got new glasses following last exam, or subsequently?:  NO    Any problem with VA with glasses?  No   Wears CLs?:  No   Headaches?  No   Eye pain/discomfort?  No                                                                                     Flashes?  No   Floaters?  No   Diplopia/Double vision?  No   Patient's Ocular History:          Any eye surgeries? No          Any eye injury?  No          Any treatment for eye disease?  POAG   Family history of eye disease?  No   Significant patient medical history:         1. Diabetes?  No      If yes, IDDM or NIDDM? NA           2. HBP?  Yes, controlled by medication                  ! OTC eyedrops currently using:  No    ! Prescription eye meds currently using:  Combigan 0.2 / 0.5% BID OU   ! Any history of allergy/adverse reaction to any eye meds used   previously?  No    ! Any history of allergy/adverse reaction to eyedrops used during prior   eye exam(s)? No    ! Any history of allergy/adverse reaction to Novacaine or similar meds?   No    ! Any history of allergy/adverse reaction to Epinephrine or similar meds?   No    ! Patient okay with use of anesthetic eyedrops to check eye pressure?    Yes        ! Patient okay with use of eyedrops to dilate pupils today?  Yes    !  Allergies/Medications/Medical History/Family History reviewed today?    Yes       PD =   65/61  Desired reading  "distance =   15.25"                          Last edited by Darrius Romero, OD on 11/22/2017 11:43 AM. (History)            Assessment /Plan     For exam results, see Encounter Report.    1. Ocular hypertension, bilateral     2. Primary open angle glaucoma of both eyes, mild stage     3. Nuclear sclerosis of both eyes     4. Sarcoidosis     5. Essential hypertension     6. Screening for eye condition     7. Hyperopia with presbyopia of both eyes                    Prior diagnosis of bilateral ocular hypertension.  Currently using Combigan Ophthalmic Solution two times per day in both eyes for IOP control.  Intraocular pressure within normal range in each eye.  Repeat HVF test done today.    Reviewed results and discussed with Mrs. Salazar.   Results suggestive of early stage glaucoma in each eye.  Continue to instill Combigan Ophthalmic Solution (one drop two times per day) in both eyes.   Early nuclear sclerosis in both eyes.  Hyperopia in each eye, with good correctable VA in each eye.  Presbyopia consistent with age.   New spectacle lens Rx issued for use as desired.  Recheck in one year, with repeat HVF test (24-2 RORY Standard) at that visit.                "

## 2017-11-30 DIAGNOSIS — E78.49 OTHER HYPERLIPIDEMIA: ICD-10-CM

## 2017-12-01 DIAGNOSIS — E78.49 OTHER HYPERLIPIDEMIA: ICD-10-CM

## 2017-12-01 RX ORDER — ATORVASTATIN CALCIUM 40 MG/1
TABLET, FILM COATED ORAL
Qty: 90 TABLET | Refills: 3 | OUTPATIENT
Start: 2017-12-01

## 2017-12-01 RX ORDER — ATORVASTATIN CALCIUM 40 MG/1
40 TABLET, FILM COATED ORAL NIGHTLY
Qty: 90 TABLET | Refills: 3 | Status: CANCELLED | OUTPATIENT
Start: 2017-12-01

## 2017-12-01 RX ORDER — ATORVASTATIN CALCIUM 40 MG/1
40 TABLET, FILM COATED ORAL NIGHTLY
Qty: 90 TABLET | Refills: 0 | Status: SHIPPED | OUTPATIENT
Start: 2017-12-01 | End: 2018-06-20 | Stop reason: SDUPTHER

## 2017-12-01 RX ORDER — ATORVASTATIN CALCIUM 40 MG/1
40 TABLET, FILM COATED ORAL NIGHTLY
Qty: 90 TABLET | Refills: 3 | OUTPATIENT
Start: 2017-12-01

## 2017-12-01 NOTE — TELEPHONE ENCOUNTER
Pt will like a refill of her prescription atorcastatin (liptor) 40 mg tablet she has an appt with Dr Cheng for Baldo 3 for 2pm

## 2017-12-01 NOTE — TELEPHONE ENCOUNTER
LVM for pt to give the office a call back in regards to scheduling an appt with a new provider to have her prescription refilled atorvastatin

## 2017-12-01 NOTE — TELEPHONE ENCOUNTER
----- Message from Kellee Staples sent at 12/1/2017  9:34 AM CST -----  Contact: Patient [phone] ext [phone] or Cell [phone]    Is this a refill or new RX:  Refill    RX name and strength: atorvastatin (LIPITOR) 40 MG tablet    Pharmacy name and phone #:  [facility] [facility] [place] - [address] [place] [address] [address] [phone] (phone) [phone] (Fax)    Comments:  The pharmacy faxed an authorization to refill this yesterday and got no response.

## 2017-12-29 DIAGNOSIS — I10 ESSENTIAL HYPERTENSION: ICD-10-CM

## 2017-12-29 RX ORDER — TRIAMTERENE/HYDROCHLOROTHIAZID 37.5-25 MG
1 TABLET ORAL DAILY
Qty: 90 TABLET | Refills: 0 | Status: SHIPPED | OUTPATIENT
Start: 2017-12-29 | End: 2018-04-25 | Stop reason: SDUPTHER

## 2018-01-31 DIAGNOSIS — I10 ESSENTIAL HYPERTENSION: ICD-10-CM

## 2018-01-31 RX ORDER — LOSARTAN POTASSIUM 100 MG/1
100 TABLET ORAL DAILY
Qty: 90 TABLET | Refills: 0 | Status: SHIPPED | OUTPATIENT
Start: 2018-01-31 | End: 2018-05-21 | Stop reason: SDUPTHER

## 2018-04-25 ENCOUNTER — TELEPHONE (OUTPATIENT)
Dept: INTERNAL MEDICINE | Facility: CLINIC | Age: 58
End: 2018-04-25

## 2018-04-25 ENCOUNTER — HOSPITAL ENCOUNTER (OUTPATIENT)
Dept: RADIOLOGY | Facility: HOSPITAL | Age: 58
Discharge: HOME | End: 2018-04-25
Attending: INTERNAL MEDICINE
Payer: COMMERCIAL

## 2018-04-25 ENCOUNTER — OFFICE VISIT (OUTPATIENT)
Dept: INTERNAL MEDICINE | Facility: CLINIC | Age: 58
End: 2018-04-25
Payer: COMMERCIAL

## 2018-04-25 VITALS
OXYGEN SATURATION: 97 % | HEIGHT: 65 IN | SYSTOLIC BLOOD PRESSURE: 117 MMHG | BODY MASS INDEX: 35.16 KG/M2 | HEART RATE: 75 BPM | DIASTOLIC BLOOD PRESSURE: 88 MMHG | WEIGHT: 211 LBS

## 2018-04-25 DIAGNOSIS — Z86.2 HISTORY OF SARCOIDOSIS: ICD-10-CM

## 2018-04-25 DIAGNOSIS — I10 ESSENTIAL HYPERTENSION: ICD-10-CM

## 2018-04-25 DIAGNOSIS — Z87.74 PERSONAL HISTORY OF ARTERIAL VENOUS MALFORMATION (AVM): ICD-10-CM

## 2018-04-25 DIAGNOSIS — M85.80 OSTEOPENIA, UNSPECIFIED LOCATION: ICD-10-CM

## 2018-04-25 DIAGNOSIS — Q25.72 PULMONARY ARTERIOVENOUS MALFORMATION: ICD-10-CM

## 2018-04-25 DIAGNOSIS — G47.33 OSA (OBSTRUCTIVE SLEEP APNEA): Primary | ICD-10-CM

## 2018-04-25 DIAGNOSIS — R73.03 PRE-DIABETES: ICD-10-CM

## 2018-04-25 DIAGNOSIS — G47.33 OSA ON CPAP: ICD-10-CM

## 2018-04-25 DIAGNOSIS — E78.5 HYPERLIPIDEMIA, UNSPECIFIED HYPERLIPIDEMIA TYPE: ICD-10-CM

## 2018-04-25 DIAGNOSIS — R06.09 DYSPNEA ON EXERTION: ICD-10-CM

## 2018-04-25 DIAGNOSIS — Z00.00 HEALTH CARE MAINTENANCE: Primary | ICD-10-CM

## 2018-04-25 PROCEDURE — 3074F SYST BP LT 130 MM HG: CPT | Mod: CPTII,S$GLB,, | Performed by: INTERNAL MEDICINE

## 2018-04-25 PROCEDURE — 99396 PREV VISIT EST AGE 40-64: CPT | Mod: S$GLB,,, | Performed by: INTERNAL MEDICINE

## 2018-04-25 PROCEDURE — 71046 X-RAY EXAM CHEST 2 VIEWS: CPT | Mod: TC

## 2018-04-25 PROCEDURE — 99999 PR PBB SHADOW E&M-EST. PATIENT-LVL IV: CPT | Mod: PBBFAC,,, | Performed by: INTERNAL MEDICINE

## 2018-04-25 PROCEDURE — 71046 X-RAY EXAM CHEST 2 VIEWS: CPT | Mod: 26,,, | Performed by: RADIOLOGY

## 2018-04-25 PROCEDURE — 3079F DIAST BP 80-89 MM HG: CPT | Mod: CPTII,S$GLB,, | Performed by: INTERNAL MEDICINE

## 2018-04-25 RX ORDER — TRIAMTERENE/HYDROCHLOROTHIAZID 37.5-25 MG
1 TABLET ORAL DAILY
Qty: 90 TABLET | Refills: 3 | Status: SHIPPED | OUTPATIENT
Start: 2018-04-25 | End: 2018-11-29 | Stop reason: ALTCHOICE

## 2018-04-25 NOTE — PROGRESS NOTES
"Subjective:       Patient ID: Cydney Salazar is a 58 y.o. female.    Chief Complaint: Establish Care (establishing care as a new patient, transfer care pt of Dr. Quezada.); Annual Exam (yearly check up.); and Shortness of Breath (pt experiences SOB along with sharp pains on the R side of sternum & lightheadedness.)    HPI   Cydney Salazar is a 58 y.o. female here to establish care and have yearly preventative healthcare visit.     Former pt of Dr. Quezada.     HTN  Losartan 100mg  Triamterene-hctz 37.5-25mg    HLD  atorva 40mg    Prediabetes    Hx middle lobe pulmonary AVM embolized 5/2016; hx sarcoidosis.  Since that time getting shortness of breath and sharp pain intermittently.     KAREY - hates cpap; uses only intermittently. Has full face mask.  She has auto cpap.     Dad recently had stroke. Her  recently had fall and hip fracture and knee surgery.     Hasn't needed albuterol in awhile.     Review of Systems   Constitutional: Negative for fever.   HENT: Negative.    Eyes: Negative.    Respiratory: Negative for shortness of breath.    Cardiovascular: Negative for chest pain and leg swelling.   Gastrointestinal: Negative for abdominal pain, diarrhea, nausea and vomiting.   Genitourinary: Negative.    Musculoskeletal: Negative for arthralgias.   Skin: Negative for rash.   Psychiatric/Behavioral: Negative.        Objective:   /88 (BP Location: Right arm, Patient Position: Sitting, BP Method: Large (Manual))   Pulse 75   Ht 5' 5" (1.651 m)   Wt 95.7 kg (211 lb)   SpO2 97% Comment: ra  BMI 35.11 kg/m²      Physical Exam   Constitutional: She is oriented to person, place, and time. She appears well-developed and well-nourished.   HENT:   Head: Normocephalic and atraumatic.   Eyes: Conjunctivae and EOM are normal. Pupils are equal, round, and reactive to light.   Neck: Neck supple. No thyromegaly present.   Cardiovascular: Normal rate, regular rhythm and normal heart sounds.    No murmur " heard.  Pulmonary/Chest: Effort normal and breath sounds normal. No respiratory distress. She has no wheezes.   Abdominal: Soft. Bowel sounds are normal. She exhibits no distension. There is no tenderness.   Musculoskeletal: Normal range of motion.   Neurological: She is alert and oriented to person, place, and time.   Skin: Skin is warm and dry. No rash noted.   Psychiatric: She has a normal mood and affect. Judgment and thought content normal.   Vitals reviewed.      Assessment:       1. Health care maintenance    2. Essential hypertension    3. Pre-diabetes    4. Hyperlipidemia, unspecified hyperlipidemia type    5. Osteopenia, unspecified location    6. History of sarcoidosis    7. Personal history of arterial venous malformation (AVM)    8. Pulmonary arteriovenous malformation    9. KAREY on CPAP    10. Dyspnea on exertion        Plan:       Cydnye Mnotes was seen today for establish care, annual exam and shortness of breath.    Diagnoses and all orders for this visit:    Health care maintenance  Reviewed screenings, up to date    Essential hypertension  -     Refill triamterene-hydrochlorothiazide 37.5-25 mg (MAXZIDE-25) 37.5-25 mg per tablet; Take 1 tablet by mouth once daily.  -     CBC auto differential; Future  -     Comprehensive metabolic panel; Future  -     TSH; Future  Losartan 100mg    Pre-diabetes  -     Hemoglobin A1c; Future    Hyperlipidemia, unspecified hyperlipidemia type  -     Lipid panel; Future   atorva 40    Osteopenia, unspecified location   monitor    History of sarcoidosis and Personal history of arterial venous malformation (AVM) with persist dyspnea on exertion  -     X-Ray Chest PA And Lateral; Future  -     CTA Chest Non Coronary; Future  -     Complete PFT with bronchodilator  -     Ambulatory consult to Pulmonology - follow up Dr. Aguilera     KAREY on CPAP  -     CPAP/BIPAP SUPPLIES - will try switching to nasal mask as trouble tolerating ffm. Using apap 12-18  Sleep study 7/25/15

## 2018-04-25 NOTE — TELEPHONE ENCOUNTER
Labs look good. Cholesterol significantly improved from last check. Prediabetes is stable at 6.2% - has not progressed to diabetes. Remainder labs normal. Chest xray shows expected changes from previous coiling.

## 2018-04-26 NOTE — TELEPHONE ENCOUNTER
cpap mask order written yesterday, please ensure faxed to [facility].   Sleep clinic referral placed - schedule Dr. Goodson

## 2018-04-26 NOTE — TELEPHONE ENCOUNTER
Spoke with pt to report labs. Pt verbalized understanding. Pt inquired about update on cpap supplies being switched to nasal mask.  Pt says that she'd like orders sent  [facility] [address]. Pt also inquired about being referred to another sleep medicine doctor. Please advise.

## 2018-04-27 NOTE — TELEPHONE ENCOUNTER
Spoke with pt. Pt informed that her cpap mask order was faxed over to [facility] today. Informed pt that a referral was placed for sleep clinic. Gave pt dept number to help get pt scheduled & seen sooner. verbalized understanding.

## 2018-05-09 ENCOUNTER — HOSPITAL ENCOUNTER (OUTPATIENT)
Dept: PULMONOLOGY | Facility: CLINIC | Age: 58
Discharge: HOME | End: 2018-05-09
Payer: COMMERCIAL

## 2018-05-09 ENCOUNTER — HOSPITAL ENCOUNTER (OUTPATIENT)
Dept: RADIOLOGY | Facility: HOSPITAL | Age: 58
Discharge: HOME | End: 2018-05-09
Attending: INTERNAL MEDICINE
Payer: COMMERCIAL

## 2018-05-09 ENCOUNTER — OFFICE VISIT (OUTPATIENT)
Dept: PULMONOLOGY | Facility: CLINIC | Age: 58
End: 2018-05-09
Payer: COMMERCIAL

## 2018-05-09 VITALS
WEIGHT: 209 LBS | HEIGHT: 65 IN | RESPIRATION RATE: 16 BRPM | SYSTOLIC BLOOD PRESSURE: 158 MMHG | HEART RATE: 74 BPM | DIASTOLIC BLOOD PRESSURE: 88 MMHG | OXYGEN SATURATION: 98 % | BODY MASS INDEX: 34.82 KG/M2

## 2018-05-09 DIAGNOSIS — Z87.74 PERSONAL HISTORY OF ARTERIAL VENOUS MALFORMATION (AVM): ICD-10-CM

## 2018-05-09 DIAGNOSIS — Q27.30 ARTERIO-VENOUS MALFORMATION: ICD-10-CM

## 2018-05-09 DIAGNOSIS — R06.09 DYSPNEA ON EXERTION: ICD-10-CM

## 2018-05-09 DIAGNOSIS — Z86.2 HISTORY OF SARCOIDOSIS: ICD-10-CM

## 2018-05-09 DIAGNOSIS — E66.09 CLASS 1 OBESITY DUE TO EXCESS CALORIES WITH SERIOUS COMORBIDITY AND BODY MASS INDEX (BMI) OF 34.0 TO 34.9 IN ADULT: ICD-10-CM

## 2018-05-09 DIAGNOSIS — J98.4 CRLD (CHRONIC RESTRICTIVE LUNG DISEASE): ICD-10-CM

## 2018-05-09 DIAGNOSIS — Q25.72 PULMONARY ARTERIOVENOUS MALFORMATION: ICD-10-CM

## 2018-05-09 DIAGNOSIS — R04.2 HEMOPTYSIS: ICD-10-CM

## 2018-05-09 DIAGNOSIS — I10 ESSENTIAL HYPERTENSION: ICD-10-CM

## 2018-05-09 DIAGNOSIS — D86.9 SARCOIDOSIS: Primary | ICD-10-CM

## 2018-05-09 LAB
POST FEV1 FVC: 0.78
POST FEV1: 1.67
POST FVC: 2.15
PRE FEV1 FVC: 78
PRE FEV1: 1.66
PRE FVC: 2.14
PREDICTED FEV1 FVC: 80
PREDICTED FEV1: 2.58
PREDICTED FVC: 3.2

## 2018-05-09 PROCEDURE — 94727 GAS DIL/WSHOT DETER LNG VOL: CPT | Mod: S$GLB,,, | Performed by: INTERNAL MEDICINE

## 2018-05-09 PROCEDURE — 3079F DIAST BP 80-89 MM HG: CPT | Mod: CPTII,S$GLB,, | Performed by: INTERNAL MEDICINE

## 2018-05-09 PROCEDURE — 71275 CT ANGIOGRAPHY CHEST: CPT | Mod: TC

## 2018-05-09 PROCEDURE — 99999 PR PBB SHADOW E&M-EST. PATIENT-LVL III: CPT | Mod: PBBFAC,,, | Performed by: INTERNAL MEDICINE

## 2018-05-09 PROCEDURE — 94729 DIFFUSING CAPACITY: CPT | Mod: S$GLB,,, | Performed by: INTERNAL MEDICINE

## 2018-05-09 PROCEDURE — 25500020 PHARM REV CODE 255: Performed by: INTERNAL MEDICINE

## 2018-05-09 PROCEDURE — 99214 OFFICE O/P EST MOD 30 MIN: CPT | Mod: 25,S$GLB,, | Performed by: INTERNAL MEDICINE

## 2018-05-09 PROCEDURE — 71275 CT ANGIOGRAPHY CHEST: CPT | Mod: 26,,, | Performed by: RADIOLOGY

## 2018-05-09 PROCEDURE — 94060 EVALUATION OF WHEEZING: CPT | Mod: S$GLB,,, | Performed by: INTERNAL MEDICINE

## 2018-05-09 PROCEDURE — 3077F SYST BP >= 140 MM HG: CPT | Mod: CPTII,S$GLB,, | Performed by: INTERNAL MEDICINE

## 2018-05-09 PROCEDURE — 3008F BODY MASS INDEX DOCD: CPT | Mod: CPTII,S$GLB,, | Performed by: INTERNAL MEDICINE

## 2018-05-09 RX ADMIN — IOHEXOL 100 ML: 350 INJECTION, SOLUTION INTRAVENOUS at 02:05

## 2018-05-09 NOTE — LETTER
May 9, 2018      Johanny Cheng MD  [address]  [place] [address]           [facility]monary [facility]  [address]  [place] [address]  Phone: [phone]          Patient: Cydney Salazar   MR Number: [id number]   YOB: 1960   Date of Visit: 5/9/2018       Dear Dr. Johanny Cheng:    Thank you for referring Cydney Salazar to me for evaluation. Attached you will find relevant portions of my assessment and plan of care.    If you have questions, please do not hesitate to call me. I look forward to following Cydney Salazar along with you.    Sincerely,    JOJO Aguilera MD    Enclosure  CC:  No Recipients    If you would like to receive this communication electronically, please contact [email or web address] or [phone] to request more information on [employer] access.    For providers and/or their staff who would like to refer a patient to [facility], please contact us through our one-stop-shop provider referral line, [facility], at [phone].    If you feel you have received this communication in error or would no longer like to receive these types of communications, please e-mail [email or web address]

## 2018-05-10 NOTE — PATIENT INSTRUCTIONS
No specific treatment recommendations at present.  Discussed the early role for antibiotics for treatment of respiratory infection.  Review current CTA with IR to decide on need for any additional embolization procedure.  Continue annual visits here with Spirometry and DLCO.

## 2018-05-10 NOTE — PROGRESS NOTES
Subjective:       Patient ID: Cydney Salazar is a 58 y.o. female.    Chief Complaint: Sarcoidosis    HPI HISTORY OF PRESENT ILLNESS:  Ms. Salazar is a 58-year-old nonsmoker, who returns   for an interval assessment of sarcoidosis.  Her most recent previous visit here   was in June 2016.  At that time, she had been having recurrent episodes of   hemoptysis.  She had a CT angiogram, which showed an AV malformation within the   right middle lobe.  This was subsequently treated by embolization on two   occasions.  She has not had any further episodes of hemoptysis.    Martin feels that her respiratory status has been stable.  She is not currently   using any bronchodilator medications.  She has not been on any systemic   treatment for sarcoidosis during the past several years.  She is not having any   symptoms to suggest pulmonary or extrapulmonary disease activity related to   sarcoidosis.    DATA:  I have reviewed the images from the CT angiogram done today.  There are   no acute cardiovascular abnormalities.  There is mosaic variation in lung   parenchymal density.  There are metallic coils in the right middle lobe   reflecting her prior embolizations.  There are several small pulmonary nodules   and mildly enlarged mediastinal lymph nodes, which all appear stable in   comparison to prior scans.    Pulmonary function studies done today show the forced vital capacity is 2.14 L,   which is 69% of predicted.  The FEV1 is 1.66 L or 66% of predicted.  The ratio   of these values is 78%.  Following a bronchodilator administration, there is no   significant change in the spirometry parameters.  Lung volume determination   shows the total lung capacity is 3.28 L, which is 65% of predicted.  The   diffusion capacity is 15.1, which is 77% of the expected value.  When these   study results are compared to previous studies from 2011 and 2009, there does   not appear to have been any significant detrimental  change.      CB/IN  dd: 05/09/2018 20:46:03 (CDT)  td: 05/10/2018 13:01:44 (CDT)  Doc ID   #[id number]  Job ID #[id number]    CC:       Review of Systems   Constitutional: Negative for fever and fatigue.   HENT: Positive for postnasal drip and congestion. Negative for sinus pressure and voice change.    Respiratory: Positive for dyspnea on extertion. Negative for cough, sputum production, shortness of breath and wheezing.    Cardiovascular: Negative for chest pain and leg swelling.   Genitourinary: Negative for difficulty urinating.   Musculoskeletal: Negative for arthralgias and back pain.   Skin: Negative for rash.   Gastrointestinal: Negative for abdominal pain and acid reflux.   Neurological: Negative for dizziness and weakness.   Hematological: Negative for adenopathy.       Objective:      Physical Exam   Constitutional: She is oriented to person, place, and time. She appears well-developed. She is obese.   HENT:   Head: Normocephalic.   Nose: Nose normal.   Mouth/Throat: No oropharyngeal exudate.   Neck: Normal range of motion. No JVD present. No tracheal deviation present. No thyromegaly present.   Cardiovascular: Normal rate, regular rhythm and normal heart sounds.    No murmur heard.  Pulmonary/Chest: Symmetric chest wall expansion. No stridor. She has no wheezes. She has no rhonchi. She has no rales. She exhibits no tenderness.   Abdominal: Soft. There is no tenderness.   Musculoskeletal: She exhibits no edema.   Lymphadenopathy:     She has no cervical adenopathy.   Neurological: She is alert and oriented to person, place, and time.   Skin: Skin is warm and dry. No rash noted. No erythema. Nails show no clubbing.   Psychiatric: She has a normal mood and affect.   Vitals reviewed.    Personal Diagnostic Review    No flowsheet data found.      Assessment:       1. Sarcoidosis    2. Arterio-venous malformation    3. Hemoptysis    4. Essential hypertension    5. Class 1 obesity due to excess calories with serious  comorbidity and body mass index (BMI) of 34.0 to 34.9 in adult        Outpatient Encounter Prescriptions as of 5/9/2018   Medication Sig Dispense Refill    albuterol (PROAIR HFA) 90 mcg/actuation HFAA Inhale into the lungs. 2 Aerosol Inhalation Every 3-4 hours      atorvastatin (LIPITOR) 40 MG tablet Take 1 tablet (40 mg total) by mouth every evening. 90 tablet 0    brimonidine-timolol (COMBIGAN) 0.2-0.5 % Drop Place 1 drop into both eyes 2 (two) times daily. 5 mL 5    fluticasone (FLONASE) 50 mcg/actuation nasal spray 1-2 sprays @ nostril once a day 1 Bottle 1    losartan (COZAAR) 100 MG tablet Take 1 tablet (100 mg total) by mouth once daily. 90 tablet 0    triamterene-hydrochlorothiazide 37.5-25 mg (MAXZIDE-25) 37.5-25 mg per tablet Take 1 tablet by mouth once daily. 90 tablet 3     Facility-Administered Encounter Medications as of 5/9/2018   Medication Dose Route Frequency Provider Last Rate Last Dose    [COMPLETED] omnipaque 350 iohexol 100 mL  100 mL Intravenous ONCE PRN Johanny Cheng MD   100 mL at 05/09/18 1442     Orders Placed This Encounter   Procedures    Spirometry without bronchodilator     Standing Status:   Future     Standing Expiration Date:   11/9/2019    DLCO-Carbon Monoxide Diffusing Capacity     Standing Status:   Future     Standing Expiration Date:   11/9/2019     Plan:     No specific treatment recommendations at present.  Discussed the early role for antibiotics for treatment of respiratory infection.  Review current CTA with IR to decide on need for any additional embolization procedure.  Continue annual visits here with Spirometry and DLCO.

## 2018-05-14 ENCOUNTER — TELEPHONE (OUTPATIENT)
Dept: INTERNAL MEDICINE | Facility: CLINIC | Age: 58
End: 2018-05-14

## 2018-05-14 ENCOUNTER — TELEPHONE (OUTPATIENT)
Dept: PULMONOLOGY | Facility: CLINIC | Age: 58
End: 2018-05-14

## 2018-05-14 DIAGNOSIS — Q27.30 ARTERIOVENOUS MALFORMATION: Primary | ICD-10-CM

## 2018-05-14 DIAGNOSIS — R06.09 DYSPNEA ON EXERTION: ICD-10-CM

## 2018-05-14 NOTE — TELEPHONE ENCOUNTER
Spoke with pt in regards to CT results and pt states that she already sees Dr. Aguilera in pulmonology and she has contacted his office to go over CT results as well.

## 2018-05-14 NOTE — TELEPHONE ENCOUNTER
Please let pt know that the results of her CT are difficult to interpret due to trouble holding her breath during the study. She might have additional AVM's present but it is hard to tell from this CT.  I think it would be best for her to see a pulmonary specialist for further evaluation. Please help schedule.

## 2018-05-14 NOTE — TELEPHONE ENCOUNTER
----- Message from Marielos Mcdermott sent at 5/14/2018 10:22 AM CDT -----  Contact: Self- [phone]  Pt is calling to get her lab results from last week, 5/9.

## 2018-05-14 NOTE — TELEPHONE ENCOUNTER
----- Message from Katerina Downs sent at 5/14/2018 10:28 AM CDT -----  Contact: self [phone]      ----- Message -----  From: Richard Mcdonnell  Sent: 5/14/2018  10:19 AM  To: Skylar [facility] Staff    Type: Test Results    What test was performed? Ct SCAN     Who ordered the test?     When and where were the test performed?  05/09/18 [facility]     Comments: please advise, Thanks

## 2018-05-21 DIAGNOSIS — I10 ESSENTIAL HYPERTENSION: ICD-10-CM

## 2018-05-21 RX ORDER — LOSARTAN POTASSIUM 100 MG/1
TABLET ORAL
Qty: 90 TABLET | Refills: 3 | Status: SHIPPED | OUTPATIENT
Start: 2018-05-21 | End: 2018-11-29 | Stop reason: ALTCHOICE

## 2018-05-25 ENCOUNTER — PATIENT MESSAGE (OUTPATIENT)
Dept: PULMONOLOGY | Facility: CLINIC | Age: 58
End: 2018-05-25

## 2018-05-25 ENCOUNTER — TELEPHONE (OUTPATIENT)
Dept: PULMONOLOGY | Facility: CLINIC | Age: 58
End: 2018-05-25

## 2018-05-25 DIAGNOSIS — Q27.30 ARTERIO-VENOUS MALFORMATION: Primary | ICD-10-CM

## 2018-05-25 DIAGNOSIS — J01.01 ACUTE RECURRENT MAXILLARY SINUSITIS: ICD-10-CM

## 2018-05-25 PROBLEM — J01.90 ACUTE SINUSITIS: Status: ACTIVE | Noted: 2018-05-25

## 2018-05-25 RX ORDER — AMOXICILLIN AND CLAVULANATE POTASSIUM 875; 125 MG/1; MG/1
1 TABLET, FILM COATED ORAL 2 TIMES DAILY
Qty: 20 TABLET | Refills: 1 | Status: SHIPPED | OUTPATIENT
Start: 2018-05-25 | End: 2018-06-04

## 2018-05-25 NOTE — TELEPHONE ENCOUNTER
Recent CTA reviewed with IR.  There are vessels in the RML which may represent un-imbolized AVMs.  I would like for her to have consult visit with Dr Francisco SMITH to discuss repeat angiogram, and possible embolization if needed.    She reports the recent onset of symptoms of recurrent sinusitis.  I am ordering Augmentin for treatment of this problem.

## 2018-06-04 ENCOUNTER — TELEPHONE (OUTPATIENT)
Dept: SLEEP MEDICINE | Facility: CLINIC | Age: 58
End: 2018-06-04

## 2018-06-06 ENCOUNTER — PATIENT MESSAGE (OUTPATIENT)
Dept: PULMONOLOGY | Facility: CLINIC | Age: 58
End: 2018-06-06

## 2018-06-06 ENCOUNTER — OFFICE VISIT (OUTPATIENT)
Dept: SLEEP MEDICINE | Facility: CLINIC | Age: 58
End: 2018-06-06
Payer: COMMERCIAL

## 2018-06-06 ENCOUNTER — TELEPHONE (OUTPATIENT)
Dept: OPTOMETRY | Facility: CLINIC | Age: 58
End: 2018-06-06

## 2018-06-06 VITALS
SYSTOLIC BLOOD PRESSURE: 120 MMHG | BODY MASS INDEX: 35.04 KG/M2 | WEIGHT: 210.31 LBS | HEIGHT: 65 IN | DIASTOLIC BLOOD PRESSURE: 80 MMHG

## 2018-06-06 DIAGNOSIS — G47.33 OSA (OBSTRUCTIVE SLEEP APNEA): Primary | ICD-10-CM

## 2018-06-06 PROCEDURE — 99214 OFFICE O/P EST MOD 30 MIN: CPT | Mod: S$GLB,,, | Performed by: PSYCHIATRY & NEUROLOGY

## 2018-06-06 PROCEDURE — 99999 PR PBB SHADOW E&M-EST. PATIENT-LVL III: CPT | Mod: PBBFAC,,, | Performed by: PSYCHIATRY & NEUROLOGY

## 2018-06-06 NOTE — PROGRESS NOTES
Cydney Salazar is a 55 y.o. female is here for management of obstructive sleep apnea.       BASELINE SLEEP STUDY 7/25/15: .7 of 83%; optimal cpap of 14 cm H20, split titration was limited by lack of supine REM sleep.      KAREY symptoms:  Patient with loud snoring.  Daytime Fatigue    CPAP interrogation: 1811 hours used; 5:54 hrs/n 7-day; AHI 1.6; 90% tile 17.5    Barriers to CPAP use  1. High required pressures  2. Discomfort of interface      SLEEP ROUTINE:  Activity the hour prior to sleep: watch tv    Bed partner:    Time to bed:  10:30 pm to 11 pm   Lights off:  yes  Sleep onset latency:  2-5 minutes        Disruptions or awakenings:   none  Wakeup time:      5:45 am   Perceived sleep quality: rested     Daytime naps:      none  Weekend sleep routine:      10 pm till 6:45 am   Caffeine use: 1 cup of coffee per day  exercise habit:   Walk 60 minutes per day       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    Cataract     Glaucoma     Hypertension     Sarcoidosis     in remission    Sleep apnea     uses cpap       Past Surgical History:   Procedure Laterality Date     SECTION      ir embolization of pulmonary AVM         Family History   Problem Relation Age of Onset    Glaucoma Mother     Hyperlipidemia Mother     Diabetes Daughter     Stroke Father     Diabetes Father     Hyperlipidemia Father     No Known Problems Sister     No Known Problems Brother     No Known Problems Maternal Aunt     No Known Problems Maternal Uncle     No Known Problems Paternal Aunt     No Known Problems Paternal Uncle     No Known Problems Maternal Grandmother     No Known Problems Maternal Grandfather     No Known Problems Paternal Grandmother     No Known Problems Paternal Grandfather     Amblyopia Neg Hx     Blindness Neg Hx     Cancer Neg Hx     Cataracts Neg Hx     Hypertension Neg Hx     Macular degeneration Neg Hx  "    Retinal detachment Neg Hx     Strabismus Neg Hx     Thyroid disease Neg Hx     Breast cancer Neg Hx     Colon cancer Neg Hx     Ovarian cancer Neg Hx        Social History   Substance Use Topics    Smoking status: Never Smoker    Smokeless tobacco: Never Used      Comment:  smokes, usually not in the home    Alcohol use Yes      Comment: rarely       ALLERGIES: Reviewed in EPIC    CURRENT MEDICATIONS: Reviewed in EPIC        REVIEW OF SYSTEMS:      Sleep related symptoms as per HPI.  CONST:Denies weight gain    HEENT: +sinus congestion improve with flonase  PULM: Denies dyspnea  CARD:  Denies palpitations   GI:  Denies acid reflux  : Denies polyuria  NEURO: Denies headaches  PSYCH: Denies mood disturbance  HEME: Denies anemia   Otherwise, a balance of systems reviewed is negative.           PHYSICAL EXAM:  /80   Ht 5' 5" (1.651 m)   Wt 95.4 kg (210 lb 5.1 oz)   BMI 35.00 kg/m²   GENERAL: Normal development, well groomed; Obesity  HEENT:  Conjunctivae are non-erythematous; Pupils equal, round, and reactive to light; Nose is symmetrical; Nasal mucosa is pink and moist; Septum is midline; Inferior turbinates are normal; Nasal airflow is normal; Posterior pharynx is crowded due to lateral narrowing; Modified Mallampati: 4; Posterior palate is normal; Tonsils +1; Uvula is normal and pink;Tongue is normal; Dentition is fair; No TMJ tenderness; Jaw opening and protrusion without click and without discomfort.                                        DATA   PFT 6/3/15 ratio 85%; fvc 57%; fev1 60%; dlco 79%    IMPRESSION:    1. Obstructive sleep apnea, severe by AHI; Responding to CPAP, but prior intolerance issues. Will work to optimize mask; If still intolerant, she may benefit from upgrade to BIPAP, given co-morbid sarcoidosis and reduction functional lung volumes; Most recent interrogation of CPAP reflects adequate usage     PLAN:     1. Adjust auto CPAP 15 to 20 cm (for better therapeutic " range based on recent interrogation)  2. New CPAP supplies; Nasal mask via [facility]    Sarcoidosis - followed by Dr. Aguilera     Education: During our discussion today, we talked about the etiology of obstructive sleep apnea as well as the potential ramifications of untreated sleep apnea, which could include daytime sleepiness, hypertension, heart disease and/or stroke.      Precautions: The patient was advised to abstain from driving should they feel sleepy or drowsy.      Follow up: 1 month for CPAP adherence check. MD/NP

## 2018-06-20 DIAGNOSIS — E78.49 OTHER HYPERLIPIDEMIA: ICD-10-CM

## 2018-06-21 RX ORDER — ATORVASTATIN CALCIUM 40 MG/1
TABLET, FILM COATED ORAL
Qty: 90 TABLET | Refills: 3 | Status: SHIPPED | OUTPATIENT
Start: 2018-06-21 | End: 2019-09-09 | Stop reason: SDUPTHER

## 2018-06-25 ENCOUNTER — TELEPHONE (OUTPATIENT)
Dept: INTERVENTIONAL RADIOLOGY/VASCULAR | Facility: HOSPITAL | Age: 58
End: 2018-06-25

## 2018-06-25 DIAGNOSIS — Q27.30 ARTERIOVENOUS MALFORMATION: Primary | ICD-10-CM

## 2018-06-26 DIAGNOSIS — Q27.30 ARTERIOVENOUS MALFORMATION: Primary | ICD-10-CM

## 2018-07-05 DIAGNOSIS — Q27.30 ARTERIOVENOUS MALFORMATION: Primary | ICD-10-CM

## 2018-07-06 ENCOUNTER — ANESTHESIA EVENT (OUTPATIENT)
Dept: ENDOSCOPY | Facility: HOSPITAL | Age: 58
End: 2018-07-06
Payer: COMMERCIAL

## 2018-07-06 ENCOUNTER — SURGERY (OUTPATIENT)
Age: 58
End: 2018-07-06

## 2018-07-06 ENCOUNTER — ANESTHESIA (OUTPATIENT)
Dept: ENDOSCOPY | Facility: HOSPITAL | Age: 58
End: 2018-07-06
Payer: COMMERCIAL

## 2018-07-06 ENCOUNTER — HOSPITAL ENCOUNTER (OUTPATIENT)
Facility: HOSPITAL | Age: 58
Discharge: HOME | End: 2018-07-06
Attending: INTERNAL MEDICINE | Admitting: INTERNAL MEDICINE
Payer: COMMERCIAL

## 2018-07-06 VITALS
HEIGHT: 65 IN | OXYGEN SATURATION: 98 % | RESPIRATION RATE: 16 BRPM | SYSTOLIC BLOOD PRESSURE: 151 MMHG | WEIGHT: 215 LBS | HEART RATE: 73 BPM | BODY MASS INDEX: 35.82 KG/M2 | DIASTOLIC BLOOD PRESSURE: 68 MMHG | TEMPERATURE: 98 F

## 2018-07-06 DIAGNOSIS — Q27.30 AVM (ARTERIOVENOUS MALFORMATION): ICD-10-CM

## 2018-07-06 DIAGNOSIS — Q27.30 ARTERIOVENOUS MALFORMATION: ICD-10-CM

## 2018-07-06 PROCEDURE — 25000003 PHARM REV CODE 250: Performed by: NURSE ANESTHETIST, CERTIFIED REGISTERED

## 2018-07-06 PROCEDURE — 71000033 HC RECOVERY, INTIAL HOUR

## 2018-07-06 PROCEDURE — 25500020 PHARM REV CODE 255: Performed by: INTERNAL MEDICINE

## 2018-07-06 PROCEDURE — 94761 N-INVAS EAR/PLS OXIMETRY MLT: CPT

## 2018-07-06 PROCEDURE — 37000008 HC ANESTHESIA 1ST 15 MINUTES

## 2018-07-06 PROCEDURE — 63600175 PHARM REV CODE 636 W HCPCS: Performed by: NURSE ANESTHETIST, CERTIFIED REGISTERED

## 2018-07-06 PROCEDURE — 71000039 HC RECOVERY, EACH ADD'L HOUR

## 2018-07-06 PROCEDURE — 37000009 HC ANESTHESIA EA ADD 15 MINS

## 2018-07-06 PROCEDURE — D9220A PRA ANESTHESIA: Mod: ,,, | Performed by: ANESTHESIOLOGY

## 2018-07-06 PROCEDURE — 25000003 PHARM REV CODE 250: Performed by: FAMILY MEDICINE

## 2018-07-06 PROCEDURE — 27000221 HC OXYGEN, UP TO 24 HOURS

## 2018-07-06 RX ORDER — PHENYLEPHRINE HYDROCHLORIDE 10 MG/ML
INJECTION INTRAVENOUS
Status: DISCONTINUED | OUTPATIENT
Start: 2018-07-06 | End: 2018-07-06

## 2018-07-06 RX ORDER — SODIUM CHLORIDE 9 MG/ML
500 INJECTION, SOLUTION INTRAVENOUS ONCE
Status: COMPLETED | OUTPATIENT
Start: 2018-07-06 | End: 2018-07-06

## 2018-07-06 RX ORDER — ONDANSETRON 2 MG/ML
INJECTION INTRAMUSCULAR; INTRAVENOUS
Status: DISCONTINUED | OUTPATIENT
Start: 2018-07-06 | End: 2018-07-06

## 2018-07-06 RX ORDER — SODIUM CHLORIDE 0.9 % (FLUSH) 0.9 %
3 SYRINGE (ML) INJECTION
Status: DISCONTINUED | OUTPATIENT
Start: 2018-07-06 | End: 2018-07-06 | Stop reason: HOSPADM

## 2018-07-06 RX ORDER — ROCURONIUM BROMIDE 10 MG/ML
INJECTION, SOLUTION INTRAVENOUS
Status: DISCONTINUED | OUTPATIENT
Start: 2018-07-06 | End: 2018-07-06

## 2018-07-06 RX ORDER — FENTANYL CITRATE 50 UG/ML
25 INJECTION, SOLUTION INTRAMUSCULAR; INTRAVENOUS EVERY 5 MIN PRN
Status: DISCONTINUED | OUTPATIENT
Start: 2018-07-06 | End: 2018-07-06 | Stop reason: HOSPADM

## 2018-07-06 RX ORDER — MIDAZOLAM HYDROCHLORIDE 1 MG/ML
INJECTION, SOLUTION INTRAMUSCULAR; INTRAVENOUS
Status: DISCONTINUED | OUTPATIENT
Start: 2018-07-06 | End: 2018-07-06

## 2018-07-06 RX ORDER — EPHEDRINE SULFATE 50 MG/ML
INJECTION, SOLUTION INTRAVENOUS
Status: DISCONTINUED | OUTPATIENT
Start: 2018-07-06 | End: 2018-07-06

## 2018-07-06 RX ORDER — SUCCINYLCHOLINE CHLORIDE 20 MG/ML
INJECTION INTRAMUSCULAR; INTRAVENOUS
Status: DISCONTINUED | OUTPATIENT
Start: 2018-07-06 | End: 2018-07-06

## 2018-07-06 RX ORDER — NEOSTIGMINE METHYLSULFATE 1 MG/ML
INJECTION, SOLUTION INTRAVENOUS
Status: DISCONTINUED | OUTPATIENT
Start: 2018-07-06 | End: 2018-07-06

## 2018-07-06 RX ORDER — LIDOCAINE HCL/PF 100 MG/5ML
SYRINGE (ML) INTRAVENOUS
Status: DISCONTINUED | OUTPATIENT
Start: 2018-07-06 | End: 2018-07-06

## 2018-07-06 RX ORDER — DIPHENHYDRAMINE HYDROCHLORIDE 50 MG/ML
25 INJECTION INTRAMUSCULAR; INTRAVENOUS EVERY 6 HOURS PRN
Status: DISCONTINUED | OUTPATIENT
Start: 2018-07-06 | End: 2018-07-06 | Stop reason: HOSPADM

## 2018-07-06 RX ORDER — PROPOFOL 10 MG/ML
VIAL (ML) INTRAVENOUS
Status: DISCONTINUED | OUTPATIENT
Start: 2018-07-06 | End: 2018-07-06

## 2018-07-06 RX ORDER — FENTANYL CITRATE 50 UG/ML
INJECTION, SOLUTION INTRAMUSCULAR; INTRAVENOUS
Status: DISCONTINUED | OUTPATIENT
Start: 2018-07-06 | End: 2018-07-06

## 2018-07-06 RX ORDER — GLYCOPYRROLATE 0.2 MG/ML
INJECTION INTRAMUSCULAR; INTRAVENOUS
Status: DISCONTINUED | OUTPATIENT
Start: 2018-07-06 | End: 2018-07-06

## 2018-07-06 RX ADMIN — PHENYLEPHRINE HYDROCHLORIDE 200 MCG: 10 INJECTION INTRAVENOUS at 03:07

## 2018-07-06 RX ADMIN — ROCURONIUM BROMIDE 35 MG: 10 INJECTION, SOLUTION INTRAVENOUS at 03:07

## 2018-07-06 RX ADMIN — EPHEDRINE SULFATE 5 MG: 50 INJECTION, SOLUTION INTRAMUSCULAR; INTRAVENOUS; SUBCUTANEOUS at 04:07

## 2018-07-06 RX ADMIN — IOHEXOL 90 ML: 300 INJECTION, SOLUTION INTRAVENOUS at 05:07

## 2018-07-06 RX ADMIN — PROPOFOL 130 MG: 10 INJECTION, EMULSION INTRAVENOUS at 03:07

## 2018-07-06 RX ADMIN — PHENYLEPHRINE HYDROCHLORIDE 100 MCG: 10 INJECTION INTRAVENOUS at 04:07

## 2018-07-06 RX ADMIN — SUGAMMADEX 200 MG: 100 INJECTION, SOLUTION INTRAVENOUS at 05:07

## 2018-07-06 RX ADMIN — GLYCOPYRROLATE 0.4 MG: 0.2 INJECTION, SOLUTION INTRAMUSCULAR; INTRAVENOUS at 05:07

## 2018-07-06 RX ADMIN — ROCURONIUM BROMIDE 5 MG: 10 INJECTION, SOLUTION INTRAVENOUS at 03:07

## 2018-07-06 RX ADMIN — SUCCINYLCHOLINE CHLORIDE 100 MG: 20 INJECTION, SOLUTION INTRAMUSCULAR; INTRAVENOUS at 03:07

## 2018-07-06 RX ADMIN — MIDAZOLAM HYDROCHLORIDE 2 MG: 1 INJECTION, SOLUTION INTRAMUSCULAR; INTRAVENOUS at 03:07

## 2018-07-06 RX ADMIN — SODIUM CHLORIDE: 0.9 INJECTION, SOLUTION INTRAVENOUS at 02:07

## 2018-07-06 RX ADMIN — PHENYLEPHRINE HYDROCHLORIDE 100 MCG: 10 INJECTION INTRAVENOUS at 03:07

## 2018-07-06 RX ADMIN — PHENYLEPHRINE HYDROCHLORIDE 100 MCG: 10 INJECTION INTRAVENOUS at 05:07

## 2018-07-06 RX ADMIN — FENTANYL CITRATE 100 MCG: 50 INJECTION, SOLUTION INTRAMUSCULAR; INTRAVENOUS at 03:07

## 2018-07-06 RX ADMIN — NEOSTIGMINE METHYLSULFATE 5 MG: 1 INJECTION INTRAVENOUS at 05:07

## 2018-07-06 RX ADMIN — LIDOCAINE HYDROCHLORIDE 100 MG: 20 INJECTION, SOLUTION INTRAVENOUS at 03:07

## 2018-07-06 RX ADMIN — ONDANSETRON 4 MG: 2 INJECTION INTRAMUSCULAR; INTRAVENOUS at 05:07

## 2018-07-06 RX ADMIN — EPHEDRINE SULFATE 10 MG: 50 INJECTION, SOLUTION INTRAMUSCULAR; INTRAVENOUS; SUBCUTANEOUS at 03:07

## 2018-07-06 NOTE — ANESTHESIA PREPROCEDURE EVALUATION
07/06/2018  Cydney Salazar is a 58 y.o., female.    Anesthesia Evaluation    I have reviewed the Patient Summary Reports.     I have reviewed the Medications.     Review of Systems  Anesthesia Hx:  History of prior surgery of interest to airway management or planning: Personal Hx of Anesthesia complications  Difficult Intubation, glidescope used successfully, confirmed by previous anesthetic record review   Social:  Non-Smoker, No Alcohol Use    Hematology/Oncology:  Hematology Normal   Oncology Normal     EENT/Dental:EENT/Dental Normal   Cardiovascular:   Hypertension    Pulmonary:   Sleep Apnea, CPAP Sarcoidosis, AVMs, FEV1 60%, no home O2, stable   Renal/:  Renal/ Normal     Hepatic/GI:  Hepatic/GI Normal    Musculoskeletal:  Musculoskeletal Normal    Neurological:  Neurology Normal    Dermatological:  Skin Normal    Psych:  Psychiatric Normal           Physical Exam  General:  Morbid Obesity    Airway/Jaw/Neck:  Airway Findings: Mouth Opening: Normal Tongue: Normal  General Airway Assessment: Adult, Possible difficult intubation  Mallampati: III  TM Distance: Normal, at least 6 cm     Eyes/Ears/Nose:  EYES/EARS/NOSE FINDINGS: Normal   Dental:  Dental Findings: In tact   Chest/Lungs:  Chest/Lungs Findings: Clear to auscultation, Normal Respiratory Rate     Heart/Vascular:  Heart Findings: Rate: Normal  Rhythm: Regular Rhythm  Sounds: Normal  Heart murmur: negative       Mental Status:  Mental Status Findings:  Cooperative, Alert and Oriented         Anesthesia Plan  Type of Anesthesia, risks & benefits discussed:  Anesthesia Type:  general  Patient's Preference:   Intra-op Monitoring Plan:   Intra-op Monitoring Plan Comments:   Post Op Pain Control Plan:   Post Op Pain Control Plan Comments:   Induction:   IV  Beta Blocker:  Patient is not currently on a Beta-Blocker (No further documentation  required).       Informed Consent: Patient understands risks and agrees with Anesthesia plan.  Questions answered. Anesthesia consent signed with patient.  ASA Score: 3     Day of Surgery Review of History & Physical:    H&P update referred to the provider.     Anesthesia Plan Notes:   58F morbid obesity, severe KAREY/CPAP, known diff intubation requiring glidescope, mod sarcoid with recurrent AVMS for repeat ablation under GETA        Ready For Surgery From Anesthesia Perspective.

## 2018-07-06 NOTE — PROGRESS NOTES
Hemostasis achieved to right groin via pressure held for 20 min. HOB to remain flat until 1915

## 2018-07-06 NOTE — TRANSFER OF CARE
"Anesthesia Transfer of Care Note    Patient: Cydney Salazar    Procedure(s) Performed: Procedure(s) (LRB):  Embolization (N/A)    Patient location: PACU    Anesthesia Type: general    Transport from OR: Transported from OR on 6-10 L/min O2 by face mask with adequate spontaneous ventilation    Post pain: adequate analgesia    Post assessment: no apparent anesthetic complications    Post vital signs: stable    Level of consciousness: awake    Nausea/Vomiting: no nausea/vomiting    Complications: none    Transfer of care protocol was followed      Last vitals:   Visit Vitals  BP (!) 113/58 (BP Location: Right arm, Patient Position: Lying)   Pulse 63   Temp 36.4 °C (97.6 °F) (Oral)   Resp 14   Ht 5' 5" (1.651 m)   Wt 97.5 kg (215 lb)   SpO2 100%   Breastfeeding? No   BMI 35.78 kg/m²     "

## 2018-07-06 NOTE — ANESTHESIA POSTPROCEDURE EVALUATION
"Anesthesia Post Evaluation    Patient: Cydney Salazar    Procedure(s) Performed: Procedure(s) (LRB):  Embolization (N/A)    Final Anesthesia Type: general  Patient location during evaluation: PACU  Patient participation: Yes- Able to Participate  Level of consciousness: awake and alert  Post-procedure vital signs: reviewed and stable  Pain management: adequate  Airway patency: patent  PONV status at discharge: No PONV  Anesthetic complications: no      Cardiovascular status: hemodynamically stable  Respiratory status: unassisted  Hydration status: euvolemic  Follow-up not needed.        Visit Vitals  BP (!) 125/58   Pulse 66   Temp 36.4 °C (97.6 °F) (Oral)   Resp 20   Ht 5' 5" (1.651 m)   Wt 97.5 kg (215 lb)   SpO2 100%   Breastfeeding? No   BMI 35.78 kg/m²       Pain/Blake Score: Pain Assessment Performed: Yes (7/6/2018  6:00 PM)  Presence of Pain: denies (7/6/2018  6:00 PM)  Pain Rating Prior to Med Admin: 0 (7/6/2018  6:00 PM)  Blake Score: 9 (7/6/2018  6:00 PM)      "

## 2018-07-06 NOTE — PROCEDURES
"Radiology Post-Procedure Note    Pre Op Diagnosis: Pulmonary AVM  Post Op Diagnosis: Same    Procedure: Pulmonary artery embolism    Procedure performed by: Mary Barragan MD, MD, Richard    Written Informed Consent Obtained: Yes  Specimen Removed: NO  Estimated Blood Loss: Minimal    Findings:   THe right common femoral vein was accessed under US guidance with a needle. A 7F sheath was then placed over a wire and a catheter was placed in the right pulmonary artery. A microcatheter was used to subselect vessels feeding the AVM. THese vessels were then coiled with multiple coils. The catheters were then removed and hemostasis was achieved with manual pressure.     Patient tolerated procedure well.    Samy Pizarro MD ([facility])  Radiology PGY-5  [phone]      "

## 2018-07-06 NOTE — PROGRESS NOTES
Patient arrived to IR room 189 for embolization of AVM in right lung. Consent received. Anesthesia at bedside.

## 2018-07-06 NOTE — PROGRESS NOTES
Embolization of AVM in right lung procedure complete. Patient tolerated well, no acute signs of distress noted. VSS. Dressing clean, dry and intact. Patient ready for transfer to PACU.

## 2018-07-06 NOTE — H&P
Radiology History & Physical      SUBJECTIVE:     Chief Complaint: AVM of right lung    History of Present Illness:  Cydney Salazar is a 58 y.o. female who presents for embolization of AVM in right lung.     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 Authorizing Provider   albuterol (PROAIR HFA) 90 mcg/actuation HFAA Inhale into the lungs. 2 Aerosol Inhalation Every 3-4 hours    Historical Provider, MD   atorvastatin (LIPITOR) 40 MG tablet take 1 tablet by mouth every evening 18   Johanny Cheng MD   brimonidine-timolol (COMBIGAN) 0.2-0.5 % Drop Place 1 drop into both eyes 2 (two) times daily. 16   Darriusvanesa Romero, OD   fluticasone (FLONASE) 50 mcg/actuation nasal spray 1-2 sprays @ nostril once a day 14   Gaurav Randhawa III, MD   losartan (COZAAR) 100 MG tablet take 1 tablet by mouth once daily 18   Johanny Cheng MD   triamterene-hydrochlorothiazide 37.5-25 mg (MAXZIDE-25) 37.5-25 mg per tablet Take 1 tablet by mouth once daily. 18   Johanny Cheng MD     Anticoagulants/Antiplatelets: no anticoagulation    Allergies:   Review of patient's allergies indicates:   Allergen Reactions    No known drug allergies      Sedation History:  no adverse reactions    Review of Systems:   Hematological: no known coagulopathies  Respiratory: no shortness of breath  Cardiovascular: no chest pain  Gastrointestinal: no abdominal pain  Genito-Urinary: no dysuria  Musculoskeletal: negative  Neurological: no TIA or stroke symptoms         OBJECTIVE:     Vital Signs (Most Recent)       Physical  Exam:  ASA: 2  Mallampati: 2    General: no acute distress  Mental Status: alert and oriented to person, place and time  HEENT: normocephalic, atraumatic  Chest: unlabored breathing  Heart: regular heart rate  Abdomen: nondistended  Extremity: moves all extremities    Laboratory  Lab Results   Component Value Date    INR 1.0 07/06/2018       Lab Results   Component Value Date    WBC 4.58 07/06/2018    HGB 12.1 07/06/2018    HCT 38.6 07/06/2018    MCV 91 07/06/2018     07/06/2018      Lab Results   Component Value Date     (H) 07/06/2018     07/06/2018    K 4.4 07/06/2018     07/06/2018    CO2 31 (H) 07/06/2018    BUN 14 07/06/2018    CREATININE 0.8 07/06/2018    CALCIUM 9.9 07/06/2018    ALT 15 07/06/2018    AST 16 07/06/2018    ALBUMIN 3.8 07/06/2018    BILITOT 0.5 07/06/2018       ASSESSMENT/PLAN:     Sedation Plan: moderate  Patient will undergo embolization of AVM in right lung.    Allegra Burnett MD  IR Resident

## 2018-07-07 NOTE — DISCHARGE SUMMARY
"Radiology Discharge Summary      Hospital Course: No complications    Admit Date: 7/6/2018  Discharge Date: 07/06/2018     Instructions Given to Patient: Yes  Diet: Resume prior diet  Activity: activity as tolerated    Description of Condition on Discharge: Stable  Vital Signs (Most Recent): Temp: 98.3 °F (36.8 °C) (07/06/18 2000)  Pulse: 73 (07/06/18 2000)  Resp: 16 (07/06/18 2000)  BP: (!) 151/68 (07/06/18 2000)  SpO2: 98 % (07/06/18 2000)    Discharge Disposition: Home    Discharge Diagnosis: Pulmonary AVM     Follow-up: with pulmonology    Samy Pizarro MD ([facility])  Radiology PGY-5  [phone]      "

## 2018-07-07 NOTE — DISCHARGE INSTRUCTIONS
Peripheral Angiography  Peripheral angiography is an outpatient procedure that makes a map of the vessels (arteries) in your lower body, legs, and arms, using X-ray and dye.This map can show where blood flow may be blocked.  Talk with your healthcare provider about the risks and complications of angiography.   Before the procedure  Prepare for the peripheral angiography as follows:   · Tell your healthcare provider about all medicines you take and any allergies you may have.  · Follow any directions youre given for not eating or drinking before the procedure. If your provider says to take your normal medicines, swallow them with only small sips of water.  · Arrange for a family member or friend to drive you home.  During the procedure  Here is what to expect:  ·   You may get medicine through an IV (intravenous) line to relax you. Youre given an injection to numb the insertion site. Then, a tiny skin cut (incision) is made near an artery in your groin.  · Your provider inserts a thin tube (catheter) through the incision. He or she then threads the catheter into an artery while looking at a video monitor.  · Contrast dye is injected into the catheter to confirm position. You may feel warmth or pressure in your legs and back. You lie still as X-rays are taken. The catheter is then taken out.  After the procedure  Youll be taken to a recovery area. A healthcare provider will apply pressure to the site for about 10 minutes. Your healthcare provider will tell you how long to lie down and keep the insertion site still. Your healthcare provider will discuss the results with you soon after the procedure.  Back at home  On the day you get home, dont drive, dont exercise, avoid walking and taking stairs, and avoid bending and lifting. Your healthcare provider may give you other care instructions.  Call your healthcare provider  Call your healthcare provider right away if:  · You notice a lump or bleeding at the  insertion site  · You feel pain at the insertion site  · You become lightheaded or dizzy  · You have leg pain or numbness  · You do not urinate in 8 hours    Date Last Reviewed: 5/1/2016 © 2000-2017 [employer]. [address] [place] [id number]. All rights reserved. This information is not intended as a substitute for professional medical care. Always follow your healthcare professional's instructions.      Recovery After Procedural Sedation (Adult)  You have been given medicine by vein to make you sleep during your surgery. This may have included both a pain medicine and sleeping medicine. Most of the effects have worn off. But you may still have some drowsiness for the next 6 to 8 hours.  Home care  Follow these guidelines when you get home:  · For the next 8 hours, you should be watched by a responsible adult. This person should make sure your condition is not getting worse.  · Don't drink any alcohol for the next 24 hours.  · Don't drive, operate dangerous machinery, or make important business or personal decisions during the next 24 hours.  Note: Your healthcare provider may tell you not to take any medicine by mouth for pain or sleep in the next 4 hours. These medicines may react with the medicines you were given in the hospital. This could cause a much stronger response than usual.  Follow-up care  Follow up with your healthcare provider if you are not alert and back to your usual level of activity within 12 hours.  When to seek medical advice  Call your healthcare provider right away if any of these occur:  · Drowsiness gets worse  · Weakness or dizziness gets worse  · Repeated vomiting  · You can't be awakened   Date Last Reviewed: 10/18/2016  © 4279-9925 [employer]. [address] [place] [id number]. All rights reserved. This information is not intended as a substitute for professional medical care. Always follow your healthcare professional's  instructions.    PATIENT INSTRUCTIONS  POST-ANESTHESIA    IMMEDIATELY FOLLOWING SURGERY:  Do not drive or operate machinery for the first twenty four hours after surgery.  Do not make any important decisions for twenty four hours after surgery or while taking narcotic pain medications or sedatives.  If you develop intractable nausea and vomiting or a severe headache please notify your doctor immediately.    FOLLOW-UP:  Please make an appointment with your surgeon as instructed. You do not need to follow up with anesthesia unless specifically instructed to do so.    WOUND CARE INSTRUCTIONS (if applicable):  Keep a dry clean dressing on the anesthesia/puncture wound site if there is drainage.  Once the wound has quit draining you may leave it open to air.  Generally you should leave the bandage intact for twenty four hours unless there is drainage.  If the epidural site drains for more than 36-48 hours please call the anesthesia department.    QUESTIONS?:  Please feel free to call your physician or the hospital  if you have any questions, and they will be happy to assist you.       [facility] Anesthesia Department  [address] [facility]  [phone]

## 2018-07-07 NOTE — PLAN OF CARE
Bedrest time complete; no complications noted. Patient and family state they are ready to be discharged. Instructions given to patient and family. Both verbalize understanding. Patient tolerating po liquids with no difficulty. Patient denies pain. Consent in chart upon patient's discharge from [facility].

## 2018-07-09 ENCOUNTER — PATIENT MESSAGE (OUTPATIENT)
Dept: PULMONOLOGY | Facility: CLINIC | Age: 58
End: 2018-07-09

## 2018-07-09 DIAGNOSIS — Q27.30 ARTERIOVENOUS MALFORMATION: Primary | ICD-10-CM

## 2018-07-10 ENCOUNTER — TELEPHONE (OUTPATIENT)
Dept: PULMONOLOGY | Facility: CLINIC | Age: 58
End: 2018-07-10

## 2018-07-10 ENCOUNTER — PATIENT MESSAGE (OUTPATIENT)
Dept: PULMONOLOGY | Facility: CLINIC | Age: 58
End: 2018-07-10

## 2018-07-10 ENCOUNTER — HOSPITAL ENCOUNTER (OUTPATIENT)
Dept: RADIOLOGY | Facility: HOSPITAL | Age: 58
Discharge: HOME | End: 2018-07-10
Attending: INTERNAL MEDICINE
Payer: COMMERCIAL

## 2018-07-10 DIAGNOSIS — R09.1 PLEURISY: Primary | ICD-10-CM

## 2018-07-10 DIAGNOSIS — R09.1 PLEURISY: ICD-10-CM

## 2018-07-10 DIAGNOSIS — R91.8 PULMONARY INFILTRATES ON CXR: ICD-10-CM

## 2018-07-10 PROCEDURE — 71046 X-RAY EXAM CHEST 2 VIEWS: CPT | Mod: 26,,, | Performed by: RADIOLOGY

## 2018-07-10 PROCEDURE — 71046 X-RAY EXAM CHEST 2 VIEWS: CPT | Mod: TC,FY

## 2018-07-10 RX ORDER — MOXIFLOXACIN HYDROCHLORIDE 400 MG/1
400 TABLET ORAL DAILY
Qty: 8 TABLET | Refills: 0 | Status: SHIPPED | OUTPATIENT
Start: 2018-07-10 | End: 2018-07-18

## 2018-07-10 RX ORDER — HYDROCODONE BITARTRATE AND ACETAMINOPHEN 5; 325 MG/1; MG/1
1-2 TABLET ORAL EVERY 6 HOURS PRN
Qty: 24 TABLET | Refills: 0 | Status: SHIPPED | OUTPATIENT
Start: 2018-07-10 | End: 2019-03-26 | Stop reason: CLARIF

## 2018-07-10 NOTE — TELEPHONE ENCOUNTER
Pt informed that CXR shows R lung opacity which may represent alveolar hemorrhage or pneumonia.  CBC shows WBC in high-Nl range.  She has not had fever or a productive cough, but has had chills.  I am asking her to begin Avalox to cover for possible infection.  Call to report results.

## 2018-07-10 NOTE — TELEPHONE ENCOUNTER
Pt called to report pain in chest on R since embolization procedure last week.  Description is consistent with pleural pain.  She has taken Motrin without relief.  I am asking her to come for CXR, and to begin Norco in addition to Motrin.

## 2018-07-12 ENCOUNTER — PATIENT MESSAGE (OUTPATIENT)
Dept: PULMONOLOGY | Facility: CLINIC | Age: 58
End: 2018-07-12

## 2018-07-12 ENCOUNTER — PATIENT MESSAGE (OUTPATIENT)
Dept: OBSTETRICS AND GYNECOLOGY | Facility: CLINIC | Age: 58
End: 2018-07-12

## 2018-07-17 ENCOUNTER — TELEPHONE (OUTPATIENT)
Dept: SLEEP MEDICINE | Facility: CLINIC | Age: 58
End: 2018-07-17

## 2018-07-17 NOTE — TELEPHONE ENCOUNTER
Called patient to remind him/her of their appointment tomorrow morning with provider and also WAS REMINDED to bring CPAP machine IF AVAILABLE and IF needed to reschedule or cancel to call me back at my extension provided to patient.    X Patient Confirmed  __ Left Voicemail

## 2018-07-18 ENCOUNTER — OFFICE VISIT (OUTPATIENT)
Dept: SLEEP MEDICINE | Facility: CLINIC | Age: 58
End: 2018-07-18
Payer: COMMERCIAL

## 2018-07-18 ENCOUNTER — PATIENT MESSAGE (OUTPATIENT)
Dept: PULMONOLOGY | Facility: CLINIC | Age: 58
End: 2018-07-18

## 2018-07-18 VITALS
WEIGHT: 208.75 LBS | DIASTOLIC BLOOD PRESSURE: 72 MMHG | SYSTOLIC BLOOD PRESSURE: 122 MMHG | HEART RATE: 93 BPM | HEIGHT: 65 IN | BODY MASS INDEX: 34.78 KG/M2

## 2018-07-18 DIAGNOSIS — G47.33 OSA (OBSTRUCTIVE SLEEP APNEA): Primary | ICD-10-CM

## 2018-07-18 PROCEDURE — 99999 PR PBB SHADOW E&M-EST. PATIENT-LVL III: CPT | Mod: PBBFAC,,, | Performed by: PSYCHIATRY & NEUROLOGY

## 2018-07-18 PROCEDURE — 99213 OFFICE O/P EST LOW 20 MIN: CPT | Mod: S$GLB,,, | Performed by: PSYCHIATRY & NEUROLOGY

## 2018-07-18 NOTE — PROGRESS NOTES
Cydney Salazar is a 55 y.o. female is here for management of obstructive sleep apnea.       NIghty CPAP use;  Patient with loud snoring.  Daytime Fatigue.  Relieved with nightly CPAP    Tolerating mask nasal Wisp  Tolerating presures    CPAP interrogation set at 15-20 cm: 2137 hours used; 8:06 hrs/n 30-day; AHI 1.2; 90% tile 17.5      BASELINE SLEEP STUDY 7/25/15: .7 of 83%; optimal cpap of 14 cm H20, split titration was limited by lack of supine REM sleep.      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    Cataract     Glaucoma     Hypertension     Sarcoidosis     in remission    Sleep apnea     uses cpap       Past Surgical History:   Procedure Laterality Date     SECTION      EMBOLIZATION N/A 2018    Procedure: Embolization;  Surgeon: Whit Surgeon;  Location: [facility];  Service: Radiology;  Laterality: N/A;    ir embolization of pulmonary AVM         Family History   Problem Relation Age of Onset    Glaucoma Mother     Hyperlipidemia Mother     Diabetes Daughter     Stroke Father     Diabetes Father     Hyperlipidemia Father     No Known Problems Sister     No Known Problems Brother     No Known Problems Maternal Aunt     No Known Problems Maternal Uncle     No Known Problems Paternal Aunt     No Known Problems Paternal Uncle     No Known Problems Maternal Grandmother     No Known Problems Maternal Grandfather     No Known Problems Paternal Grandmother     No  "Known Problems Paternal Grandfather     Amblyopia Neg Hx     Blindness Neg Hx     Cancer Neg Hx     Cataracts Neg Hx     Hypertension Neg Hx     Macular degeneration Neg Hx     Retinal detachment Neg Hx     Strabismus Neg Hx     Thyroid disease Neg Hx     Breast cancer Neg Hx     Colon cancer Neg Hx     Ovarian cancer Neg Hx        Social History   Substance Use Topics    Smoking status: Never Smoker    Smokeless tobacco: Never Used      Comment:  smokes, usually not in the home    Alcohol use Yes      Comment: rarely       ALLERGIES: Reviewed in EPIC    CURRENT MEDICATIONS: Reviewed in EPIC        REVIEW OF SYSTEMS:      Sleep related symptoms as per HPI.  CONST:Denies weight gain    HEENT: +sinus congestion improve with flonase  PULM: Denies dyspnea  CARD:  Denies palpitations   GI:  Denies acid reflux  : Denies polyuria  NEURO: Denies headaches  PSYCH: Denies mood disturbance  HEME: Denies anemia   Otherwise, a balance of systems reviewed is negative.           PHYSICAL EXAM:  /72   Pulse 93   Ht 5' 5" (1.651 m)   Wt 94.7 kg (208 lb 12.4 oz)   LMP  (Exact Date)   BMI 34.74 kg/m²   GENERAL: Normal development, well groomed; Obesity  HEENT:  Conjunctivae are non-erythematous; Pupils equal, round, and reactive to light; Nose is symmetrical; Nasal mucosa is pink and moist; Septum is midline; Inferior turbinates are normal; Nasal airflow is normal; Posterior pharynx is crowded due to lateral narrowing; Modified Mallampati: 4; Posterior palate is normal; Tonsils +1; Uvula is normal and pink;Tongue is normal; Dentition is fair; No TMJ tenderness; Jaw opening and protrusion without click and without discomfort.                                        DATA   PFT 6/3/15 ratio 85%; fvc 57%; fev1 60%; dlco 79%    IMPRESSION:    1. Obstructive sleep apnea, severe by AHI; Responding to CPAP, with tolerance of pressure, despite high pressure requirement. If changes to her lung function, she may " benefit from upgrade to BIPAP, given co-morbid sarcoidosis and reduction functional lung volumes; Most recent interrogation of CPAP reflects excellent usage and excellent control of her KAREY. Predicted AHI <5.     PLAN:     1. Continue auto CPAP 15 to 20 cm.  2. CPAP supplies; Nasal mask via [facility]    Sarcoidosis - followed by Dr. Aguilera     Education: During our discussion today, we talked about the etiology of obstructive sleep apnea as well as the potential ramifications of untreated sleep apnea, which could include daytime sleepiness, hypertension, heart disease and/or stroke.      Precautions: The patient was advised to abstain from driving should they feel sleepy or drowsy.      Follow up: Annually. MD/NP

## 2018-07-29 ENCOUNTER — PATIENT MESSAGE (OUTPATIENT)
Dept: PULMONOLOGY | Facility: CLINIC | Age: 58
End: 2018-07-29

## 2018-08-01 ENCOUNTER — PATIENT MESSAGE (OUTPATIENT)
Dept: OBSTETRICS AND GYNECOLOGY | Facility: CLINIC | Age: 58
End: 2018-08-01

## 2018-08-02 ENCOUNTER — PATIENT MESSAGE (OUTPATIENT)
Dept: OBSTETRICS AND GYNECOLOGY | Facility: CLINIC | Age: 58
End: 2018-08-02

## 2018-08-27 PROBLEM — J01.90 ACUTE SINUSITIS: Status: RESOLVED | Noted: 2018-05-25 | Resolved: 2018-08-27

## 2018-09-05 ENCOUNTER — TELEPHONE (OUTPATIENT)
Dept: OBSTETRICS AND GYNECOLOGY | Facility: CLINIC | Age: 58
End: 2018-09-05

## 2018-09-05 NOTE — TELEPHONE ENCOUNTER
Contacted the pt to schedule wwe. Pt agreed to an appointment on 10/31 at 9:15AM. Pt verbalized understanding. Letter sent out.

## 2018-09-07 ENCOUNTER — TELEPHONE (OUTPATIENT)
Dept: OBSTETRICS AND GYNECOLOGY | Facility: CLINIC | Age: 58
End: 2018-09-07

## 2018-09-07 NOTE — TELEPHONE ENCOUNTER
Returned the pt's call to the clinic. Pt stated that she received a call that she needed to r/s her 10/31 appointment due to Dr. Cameron being out of the office. Pt agreed to an appointment on 10/29 at 9:15AM. Pt verbalized understanding.

## 2018-09-07 NOTE — TELEPHONE ENCOUNTER
----- Message from Chantelle Munoz sent at 9/7/2018  2:17 PM CDT -----  Contact: pt            Name of Who is Calling: KEMAL GALLAGHER [[id number]]    What is the request in detail: pt calling to speak with nurse in regards to October appt.. Please advise      Can the clinic reply by [facility]: no      What [facility]: [phone]

## 2018-09-10 ENCOUNTER — TELEPHONE (OUTPATIENT)
Dept: OBSTETRICS AND GYNECOLOGY | Facility: CLINIC | Age: 58
End: 2018-09-10

## 2018-09-10 ENCOUNTER — TELEPHONE (OUTPATIENT)
Dept: INTERVENTIONAL RADIOLOGY/VASCULAR | Facility: CLINIC | Age: 58
End: 2018-09-10

## 2018-09-10 NOTE — TELEPHONE ENCOUNTER
Returned the pt's call to the clinic. Pt inquiring about a call that was placed to her. Informed the pt that the call came from the Interventional Radiologist Department with Dr. Gutierrez's office and that a message was left for his medical assistant Norma that the patient was returning a call. Pt inquired about r/s her appt with Dr. Cameron on 10/29 to what it should have been scheduled on 11/01 at 9AM. Pt agreed to an appointment on 11/01 at 9AM. Pt verbalized understanding.

## 2018-09-10 NOTE — TELEPHONE ENCOUNTER
----- Message from Sara Chisholm sent at 9/10/2018  1:18 PM CDT -----  Contact: roger  Name of Who is Calling: roger      What is the request in detail: Patient was returning a missed call back from the staff       Can the clinic reply by [facility]: no      What Number [facility] Back [facility]: [phone]

## 2018-09-10 NOTE — TELEPHONE ENCOUNTER
Left message for pt to return my call.  Need to schedule 3 month follow up CTA.  Please forward call to [phone].  Thanks

## 2018-10-16 ENCOUNTER — HOSPITAL ENCOUNTER (OUTPATIENT)
Dept: RADIOLOGY | Facility: HOSPITAL | Age: 58
Discharge: HOME | End: 2018-10-16
Attending: FAMILY MEDICINE
Payer: COMMERCIAL

## 2018-10-16 DIAGNOSIS — Q27.30 ARTERIOVENOUS MALFORMATION: ICD-10-CM

## 2018-10-16 LAB
CREAT SERPL-MCNC: 0.7 MG/DL (ref 0.5–1.4)
SAMPLE: NORMAL

## 2018-10-16 PROCEDURE — 71275 CT ANGIOGRAPHY CHEST: CPT | Mod: TC

## 2018-10-16 PROCEDURE — 71275 CT ANGIOGRAPHY CHEST: CPT | Mod: 26,,, | Performed by: RADIOLOGY

## 2018-10-16 PROCEDURE — 25500020 PHARM REV CODE 255: Performed by: FAMILY MEDICINE

## 2018-10-16 RX ADMIN — IOHEXOL 75 ML: 350 INJECTION, SOLUTION INTRAVENOUS at 09:10

## 2018-10-17 ENCOUNTER — OFFICE VISIT (OUTPATIENT)
Dept: INTERVENTIONAL RADIOLOGY/VASCULAR | Facility: CLINIC | Age: 58
End: 2018-10-17
Payer: COMMERCIAL

## 2018-10-17 VITALS
HEART RATE: 75 BPM | SYSTOLIC BLOOD PRESSURE: 182 MMHG | WEIGHT: 219.31 LBS | DIASTOLIC BLOOD PRESSURE: 83 MMHG | BODY MASS INDEX: 35.25 KG/M2 | HEIGHT: 66 IN

## 2018-10-17 DIAGNOSIS — Q27.30 ARTERIOVENOUS MALFORMATION: Primary | ICD-10-CM

## 2018-10-17 DIAGNOSIS — Q25.72 PULMONARY ARTERIOVENOUS MALFORMATION: ICD-10-CM

## 2018-10-17 PROCEDURE — 99213 OFFICE O/P EST LOW 20 MIN: CPT | Mod: S$GLB,,, | Performed by: RADIOLOGY

## 2018-10-17 PROCEDURE — 99999 PR PBB SHADOW E&M-EST. PATIENT-LVL II: CPT | Mod: PBBFAC,,,

## 2018-10-17 NOTE — PROGRESS NOTES
"Subjective:       Patient ID: Cydney Salazar is a 58 y.o. female.    Chief Complaint: No chief complaint on file.    Patient here for follow up of her right lung arteriovenous malformation. She recently underwent embolization of this AVM on 7/6/2017. She reports resolution of her hemoptysis. She has no new symptoms. She had a CTA of the chest on 10/16/18.      Review of Systems   Constitutional: Negative.  Negative for activity change, appetite change, chills, fatigue and fever.   HENT: Negative.  Negative for congestion, ear pain, mouth sores and nosebleeds.    Eyes: Negative.  Negative for photophobia, pain, discharge and itching.   Respiratory: Negative.  Negative for cough, shortness of breath, wheezing and stridor.    Cardiovascular: Negative.  Negative for chest pain, palpitations and leg swelling.   Gastrointestinal: Negative.  Negative for abdominal distention, abdominal pain, blood in stool, diarrhea, nausea and vomiting.   Endocrine: Negative.  Negative for cold intolerance, heat intolerance and polydipsia.   Genitourinary: Negative.  Negative for difficulty urinating, dyspareunia, dysuria and flank pain.   Musculoskeletal: Negative.  Negative for arthralgias, back pain, gait problem and joint swelling.   Skin: Negative.  Negative for color change, pallor and rash.   Allergic/Immunologic: Negative.    Neurological: Negative.  Negative for dizziness, seizures, facial asymmetry, light-headedness, numbness and headaches.   Hematological: Negative.  Negative for adenopathy. Does not bruise/bleed easily.   Psychiatric/Behavioral: Negative.  Negative for agitation, behavioral problems, confusion, self-injury, sleep disturbance and suicidal ideas.       Objective:       Vitals:    10/17/18 0820   BP: (!) 182/83   Pulse: 75   Weight: 99.5 kg (219 lb 4.8 oz)   Height: 5' 5.5" (1.664 m)       Physical Exam   Constitutional: She is oriented to person, place, and time. She appears well-developed and " well-nourished. No distress.   HENT:   Head: Normocephalic and atraumatic.   Right Ear: External ear normal.   Left Ear: External ear normal.   Nose: Nose normal.   Mouth/Throat: Oropharynx is clear and moist.   Eyes: Conjunctivae and EOM are normal. Pupils are equal, round, and reactive to light. Right eye exhibits no discharge. Left eye exhibits no discharge. No scleral icterus.   Neck: Normal range of motion. Neck supple. No JVD present. No tracheal deviation present. No thyromegaly present.   Cardiovascular: Normal rate, regular rhythm and normal heart sounds. Exam reveals no gallop and no friction rub.   No murmur heard.  Pulmonary/Chest: Effort normal and breath sounds normal. No respiratory distress. She has no wheezes. She has no rales.   Abdominal: Soft. Bowel sounds are normal. She exhibits no distension. There is no tenderness. There is no guarding.   Musculoskeletal: She exhibits no edema, tenderness or deformity.   Neurological: She is alert and oriented to person, place, and time. Gait normal.   Skin: Skin is warm and dry. Capillary refill takes less than 2 seconds. No rash noted. She is not diaphoretic. No erythema. No pallor.   Psychiatric: She has a normal mood and affect. Her behavior is normal. Judgment and thought content normal.   Vitals reviewed.      Assessment:       1. Arteriovenous malformation    2. Pulmonary arteriovenous malformation        Plan:           Reviewed CTA images with patient. Explained there is still a small amount of residual. Recommendation of MD is to observe residual with CTA in 3-6 mo. . Clinic phone number and Mery's phone number provided.Encouraged patient to check her blood pressure at home. Explained if she is persistently >130/>80 she should contact her primary care provider. Verbalized understanding and agreement. Patient asked appropriate questions and these were answered. Encouraged her to call with any new concerns/questions. She will do her scan on CTA of  her chest on the 23 rd of January @ 8 AM and will follow up in clinic on January 30th 8AM.

## 2018-11-01 ENCOUNTER — OFFICE VISIT (OUTPATIENT)
Dept: OBSTETRICS AND GYNECOLOGY | Facility: CLINIC | Age: 58
End: 2018-11-01
Attending: OBSTETRICS & GYNECOLOGY
Payer: COMMERCIAL

## 2018-11-01 VITALS
SYSTOLIC BLOOD PRESSURE: 128 MMHG | DIASTOLIC BLOOD PRESSURE: 78 MMHG | BODY MASS INDEX: 36.36 KG/M2 | WEIGHT: 218.25 LBS | HEIGHT: 65 IN

## 2018-11-01 DIAGNOSIS — Z01.419 VISIT FOR GYNECOLOGIC EXAMINATION: Primary | ICD-10-CM

## 2018-11-01 DIAGNOSIS — Z12.31 ENCOUNTER FOR SCREENING MAMMOGRAM FOR BREAST CANCER: ICD-10-CM

## 2018-11-01 PROCEDURE — 99999 PR PBB SHADOW E&M-EST. PATIENT-LVL III: CPT | Mod: PBBFAC,,, | Performed by: OBSTETRICS & GYNECOLOGY

## 2018-11-01 PROCEDURE — 99396 PREV VISIT EST AGE 40-64: CPT | Mod: S$GLB,,, | Performed by: OBSTETRICS & GYNECOLOGY

## 2018-11-01 NOTE — PROGRESS NOTES
CC: Well woman exam    Cydney Salazar is a 58 y.o. female  presents for a well woman exam.  LMP: No LMP recorded. Patient is postmenopausal..  No issues, problems, or complaints.    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    Cataract     Glaucoma     Hypertension     Sarcoidosis     in remission    Sleep apnea     uses cpap     Past Surgical History:   Procedure Laterality Date     SECTION      EMBOLIZATION N/A 2018    Procedure: Embolization;  Surgeon: Whit Surgeon;  Location: [facility];  Service: Radiology;  Laterality: N/A;    Embolization N/A 2018    Performed by Whit Surgeon at [facility]    EMBOLIZATION N/A 2017    Performed by [facility] at [facility]    EMBOLIZATION N/A 2017    Performed by Whit Surgeon at [facility]    ir embolization of pulmonary AVM       Social History     Socioeconomic History    Marital status:      Spouse name: None    Number of children: None    Years of education: None    Highest education level: None   Social Needs    Financial resource strain: None    Food insecurity - worry: None    Food insecurity - inability: None    Transportation needs - medical: None    Transportation needs - non-medical: None   Occupational History    None   Tobacco Use    Smoking status: Never Smoker    Smokeless tobacco: Never Used    Tobacco comment:  smokes, usually not in the home   Substance and Sexual Activity    Alcohol use: Yes     Comment: rarely    Drug use: No    Sexual activity: Yes     Partners: Male     Birth control/protection: Post-menopausal   Other Topics Concern    None   Social History Narrative    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     Family History   Problem Relation Age of Onset    Glaucoma Mother     Hyperlipidemia Mother     Diabetes Daughter     Stroke Father     Diabetes Father     Hyperlipidemia Father     No Known Problems  "Sister     No Known Problems Brother     No Known Problems Maternal Aunt     No Known Problems Maternal Uncle     No Known Problems Paternal Aunt     No Known Problems Paternal Uncle     No Known Problems Maternal Grandmother     No Known Problems Maternal Grandfather     No Known Problems Paternal Grandmother     No Known Problems Paternal Grandfather     Amblyopia Neg Hx     Blindness Neg Hx     Cancer Neg Hx     Cataracts Neg Hx     Hypertension Neg Hx     Macular degeneration Neg Hx     Retinal detachment Neg Hx     Strabismus Neg Hx     Thyroid disease Neg Hx     Breast cancer Neg Hx     Colon cancer Neg Hx     Ovarian cancer Neg Hx      OB History      Para Term  AB Living    1 1 0     1    SAB TAB Ectopic Multiple Live Births            1          /78 (BP Location: Left arm, Patient Position: Sitting)   Ht 5' 5" (1.651 m)   Wt 99 kg (218 lb 4.1 oz)   BMI 36.32 kg/m²       ROS:    ROS:  GENERAL: Denies weight gain or weight loss. Feeling well overall.   SKIN: Denies rash or lesions.   HEAD: Denies head injury or headache.   NODES: Denies enlarged lymph nodes.   CHEST: Denies chest pain or shortness of breath.   CARDIOVASCULAR: Denies palpitations or left sided chest pain.   ABDOMEN: No abdominal pain, constipation, diarrhea, nausea, vomiting or rectal bleeding.   URINARY: No frequency, dysuria, hematuria, or burning on urination.  REPRODUCTIVE: See HPI.   BREASTS: The patient performs breast self-examination and denies pain, lumps, or nipple discharge.   HEMATOLOGIC: No easy bruisability or excessive bleeding.   MUSCULOSKELETAL: Denies joint pain or swelling.   NEUROLOGIC: Denies syncope or weakness.   PSYCHIATRIC: Denies depression, anxiety or mood swings.    PHYSICAL EXAM:    APPEARANCE: Well nourished, well developed, in no acute distress.  AFFECT: WNL, alert and oriented x 3  SKIN: No acne or hirsutism  NECK: Neck symmetric without masses or thyromegaly  NODES: " No inguinal, cervical, axillary, or femoral lymph node enlargement  CHEST: Good respiratory effect  ABDOMEN: Soft.  No tenderness or masses.  No hepatosplenomegaly.  No hernias.  BREASTS: Symmetrical, no skin changes or visible lesions.  No palpable masses, nipple discharge bilaterally.  PELVIC: Normal external genitalia without lesions.  Normal hair distribution.  Adequate perineal body, normal urethral meatus.  Vagina atrophic without lesions or discharge.  Cervix pink, without lesions, discharge or tenderness.  No significant cystocele or rectocele.  Bimanual exam shows uterus to be normal size, regular, mobile and nontender.  Adnexa without masses or tenderness.    RECTAL: Rectovaginal exam confirms above with normal sphincter tone, no masses.  EXTREMITIES: No edema.      ICD-10-CM ICD-9-CM    1. Visit for gynecologic examination Z01.419 V72.31    2. Encounter for screening mammogram for breast cancer Z12.31 V76.12 Mammo Digital Screening Bilat         Patient was counseled today on A.C.S. Pap guidelines and recommendations for yearly pelvic exams, pap smears every 3 years (due 2019), mammograms and monthly self breast exams; to see her PCP for other health maintenance.     Follow-up in about 1 year (around 11/1/2019).

## 2018-11-10 ENCOUNTER — HOSPITAL ENCOUNTER (OUTPATIENT)
Dept: RADIOLOGY | Facility: HOSPITAL | Age: 58
Discharge: HOME | End: 2018-11-10
Attending: OBSTETRICS & GYNECOLOGY
Payer: COMMERCIAL

## 2018-11-10 DIAGNOSIS — Z12.31 ENCOUNTER FOR SCREENING MAMMOGRAM FOR BREAST CANCER: ICD-10-CM

## 2018-11-10 PROCEDURE — 77063 BREAST TOMOSYNTHESIS BI: CPT | Mod: TC

## 2018-11-10 PROCEDURE — 77063 BREAST TOMOSYNTHESIS BI: CPT | Mod: 26,,, | Performed by: RADIOLOGY

## 2018-11-10 PROCEDURE — 77067 SCR MAMMO BI INCL CAD: CPT | Mod: 26,,, | Performed by: RADIOLOGY

## 2018-11-10 PROCEDURE — 77067 SCR MAMMO BI INCL CAD: CPT | Mod: TC

## 2018-11-26 ENCOUNTER — NURSE TRIAGE (OUTPATIENT)
Dept: ADMINISTRATIVE | Facility: CLINIC | Age: 58
End: 2018-11-26

## 2018-11-26 NOTE — TELEPHONE ENCOUNTER
Reason for Disposition   Taking a medicine that could cause dizziness (e.g., blood pressure medications, diuretics)    Protocols used: ST DIZZINESS-A-OH    Pt reports some dizziness on and off for a couple of weeks. States that she currently is completely asymptomatic. Reports that PCP has retired. Was attempting to get patient another appt to see MD at same clinic and patient decided that she didn't want to do that and hung up.

## 2018-11-29 ENCOUNTER — HOSPITAL ENCOUNTER (OUTPATIENT)
Dept: CARDIOLOGY | Facility: CLINIC | Age: 58
Discharge: HOME | End: 2018-11-29
Payer: COMMERCIAL

## 2018-11-29 ENCOUNTER — HOSPITAL ENCOUNTER (OUTPATIENT)
Dept: RADIOLOGY | Facility: HOSPITAL | Age: 58
Discharge: HOME | End: 2018-11-29
Attending: INTERNAL MEDICINE
Payer: COMMERCIAL

## 2018-11-29 ENCOUNTER — OFFICE VISIT (OUTPATIENT)
Dept: INTERNAL MEDICINE | Facility: CLINIC | Age: 58
End: 2018-11-29
Payer: COMMERCIAL

## 2018-11-29 VITALS
OXYGEN SATURATION: 95 % | DIASTOLIC BLOOD PRESSURE: 78 MMHG | SYSTOLIC BLOOD PRESSURE: 150 MMHG | WEIGHT: 218 LBS | HEIGHT: 65 IN | BODY MASS INDEX: 36.32 KG/M2 | HEART RATE: 96 BPM

## 2018-11-29 DIAGNOSIS — R06.02 SHORTNESS OF BREATH: ICD-10-CM

## 2018-11-29 DIAGNOSIS — R42 LIGHTHEADEDNESS: ICD-10-CM

## 2018-11-29 DIAGNOSIS — I10 ESSENTIAL HYPERTENSION: ICD-10-CM

## 2018-11-29 DIAGNOSIS — D86.9 SARCOIDOSIS: ICD-10-CM

## 2018-11-29 DIAGNOSIS — H93.A2 PULSATILE TINNITUS OF LEFT EAR: Primary | ICD-10-CM

## 2018-11-29 PROCEDURE — 93010 ELECTROCARDIOGRAM REPORT: CPT | Mod: S$GLB,,, | Performed by: INTERNAL MEDICINE

## 2018-11-29 PROCEDURE — 99214 OFFICE O/P EST MOD 30 MIN: CPT | Mod: S$GLB,,, | Performed by: INTERNAL MEDICINE

## 2018-11-29 PROCEDURE — 71046 X-RAY EXAM CHEST 2 VIEWS: CPT | Mod: TC

## 2018-11-29 PROCEDURE — 71046 X-RAY EXAM CHEST 2 VIEWS: CPT | Mod: 26,,, | Performed by: RADIOLOGY

## 2018-11-29 PROCEDURE — 99999 PR PBB SHADOW E&M-EST. PATIENT-LVL IV: CPT | Mod: PBBFAC,,, | Performed by: INTERNAL MEDICINE

## 2018-11-29 PROCEDURE — 93005 ELECTROCARDIOGRAM TRACING: CPT | Mod: S$GLB,,, | Performed by: INTERNAL MEDICINE

## 2018-11-29 RX ORDER — LOSARTAN POTASSIUM AND HYDROCHLOROTHIAZIDE 25; 100 MG/1; MG/1
1 TABLET ORAL DAILY
Qty: 90 TABLET | Refills: 3 | Status: SHIPPED | OUTPATIENT
Start: 2018-11-29 | End: 2019-08-26

## 2018-11-29 RX ORDER — AMLODIPINE BESYLATE 5 MG/1
5 TABLET ORAL DAILY
Qty: 90 TABLET | Refills: 3 | Status: SHIPPED | OUTPATIENT
Start: 2018-11-29 | End: 2019-01-11

## 2018-11-29 NOTE — PROGRESS NOTES
"Subjective:       Patient ID: Cydney Salazar is a 58 y.o. female.    Chief Complaint: Dizziness (patient feels lightheaded, weak/fatigue feeling and pulsating in the L ear.); Chest Pain (started about a week and a half ago, heaviness in the chest area along with moderate SOB (short winded).); and Hypertension (requesting change in BP med. )    HPI   59 yo F here for   About 10 days ago got lightheaded and head heavy feeling. Sweaty.   Feeling sick when bending down and standing back up.   Getting out of breath.  Feels like breath catches. No chest pain.   Hasn't been checking BP.  Left ear "thumping" sound - pulsatile tinnitus.   no weakness, no confusion.    HTN  Losartan 100mg  Triamterene-hctz 37.5-25mg     HLD  atorva 40mg     Prediabetes     Hx middle lobe pulmonary AVM embolized 5/2016; hx sarcoidosis.  Since that time getting shortness of breath and sharp pain intermittently.   scheduled for new pulmonologist in January.     KAREY - hates cpap; uses only intermittently. Has full face mask.  She has auto cpap.   Review of Systems   Constitutional: Negative for fever.   Respiratory: Negative for shortness of breath.    Cardiovascular: Negative for chest pain.   Musculoskeletal: Negative.    Skin: Negative.        Objective:   BP (!) 150/78   Pulse 96   Ht 5' 5" (1.651 m)   Wt 98.9 kg (218 lb)   SpO2 95%   BMI 36.28 kg/m²      Physical Exam   Constitutional: She is oriented to person, place, and time. She appears well-developed and well-nourished. No distress.   HENT:   Head: Normocephalic and atraumatic.   Cardiovascular: Normal rate and regular rhythm.   Pulmonary/Chest: Effort normal. No respiratory distress. She has no wheezes. She has no rales.   Neurological: She is alert and oriented to person, place, and time.   Skin: Skin is warm and dry. She is not diaphoretic.   Psychiatric: She has a normal mood and affect. Her behavior is normal.       Assessment:       1. Pulsatile tinnitus of left ear  "   2. Lightheadedness    3. Essential hypertension    4. Shortness of breath    5. Sarcoidosis        Plan:       Cydney Montes was seen today for dizziness, chest pain and hypertension.    Diagnoses and all orders for this visit:    Pulsatile tinnitus of left ear  -     US Carotid Bilateral; Future    Lightheadedness - due to diuretic therapy?  -     US Carotid Bilateral; Future  -     CBC auto differential; Future  -     Comprehensive metabolic panel; Future  -     TSH; Future  -     X-Ray Chest PA And Lateral; Future    Essential hypertension  -    Stop triamterene; change to losartan-hydrochlorothiazide 100-25 mg (HYZAAR) 100-25 mg per tablet; Take 1 tablet by mouth once daily. (same dose already on)  -     Add amLODIPine (NORVASC) 5 MG tablet; Take 1 tablet (5 mg total) by mouth once daily.    Shortness of breath  -     EKG 12-lead; Future    Sarcoidosis  -     X-Ray Chest PA And Lateral; Future

## 2018-12-03 ENCOUNTER — HOSPITAL ENCOUNTER (OUTPATIENT)
Dept: RADIOLOGY | Facility: HOSPITAL | Age: 58
Discharge: HOME | End: 2018-12-03
Attending: INTERNAL MEDICINE
Payer: COMMERCIAL

## 2018-12-03 ENCOUNTER — TELEPHONE (OUTPATIENT)
Dept: OPTOMETRY | Facility: CLINIC | Age: 58
End: 2018-12-03

## 2018-12-03 DIAGNOSIS — H93.A2 PULSATILE TINNITUS OF LEFT EAR: ICD-10-CM

## 2018-12-03 DIAGNOSIS — R42 LIGHTHEADEDNESS: ICD-10-CM

## 2018-12-03 PROCEDURE — 93880 EXTRACRANIAL BILAT STUDY: CPT | Mod: TC

## 2018-12-03 PROCEDURE — 93880 EXTRACRANIAL BILAT STUDY: CPT | Mod: 26,,, | Performed by: RADIOLOGY

## 2018-12-03 NOTE — TELEPHONE ENCOUNTER
Spoke to patient inform her that appt for 1/9/2018 was the soonest appt avail. Pt understood an agreed to keep appt as schedule.

## 2018-12-04 ENCOUNTER — TELEPHONE (OUTPATIENT)
Dept: INTERNAL MEDICINE | Facility: CLINIC | Age: 58
End: 2018-12-04

## 2018-12-04 NOTE — TELEPHONE ENCOUNTER
----- Message from Katerina Downs sent at 12/4/2018 11:17 AM CST -----  Contact: Patient [phone]      ----- Message -----  From: Britt Casey  Sent: 12/4/2018  11:03 AM  To: Skylar Orlando Staff    Pt is calling to speak with someone in the office. She states that she had some test done and she was calling to get the results. Please call back and advise.      Thanks

## 2018-12-27 ENCOUNTER — OFFICE VISIT (OUTPATIENT)
Dept: INTERNAL MEDICINE | Facility: CLINIC | Age: 58
End: 2018-12-27
Payer: COMMERCIAL

## 2018-12-27 VITALS
OXYGEN SATURATION: 95 % | WEIGHT: 222 LBS | HEIGHT: 65 IN | SYSTOLIC BLOOD PRESSURE: 124 MMHG | DIASTOLIC BLOOD PRESSURE: 74 MMHG | HEART RATE: 77 BPM | BODY MASS INDEX: 36.99 KG/M2

## 2018-12-27 DIAGNOSIS — I10 ESSENTIAL HYPERTENSION: Primary | ICD-10-CM

## 2018-12-27 PROCEDURE — 90732 PPSV23 VACC 2 YRS+ SUBQ/IM: CPT | Mod: S$GLB,,, | Performed by: INTERNAL MEDICINE

## 2018-12-27 PROCEDURE — 90471 IMMUNIZATION ADMIN: CPT | Mod: S$GLB,,, | Performed by: INTERNAL MEDICINE

## 2018-12-27 PROCEDURE — 99214 OFFICE O/P EST MOD 30 MIN: CPT | Mod: 25,S$GLB,, | Performed by: INTERNAL MEDICINE

## 2018-12-27 PROCEDURE — 99999 PR PBB SHADOW E&M-EST. PATIENT-LVL III: CPT | Mod: PBBFAC,,, | Performed by: INTERNAL MEDICINE

## 2018-12-27 NOTE — PROGRESS NOTES
"Subjective:       Patient ID: Cydney Salazar is a 58 y.o. female.    Chief Complaint: Follow-up (4 week follow up.)    HPI   57 yo here for follow up of HTN. Was feeling lightheaded (due to extra diuretic?)  Losartan-hctz 100-25 (recently stopped triamterene)  Added amlodipine 5mg last visit.  She did have some significant swelling when amlodipine started.   On her feet a lot in retail, more than usual.     Still having shortness of breath. Hx of sarcoidosis and restrictive lung disease. Will see pulm on 1/16.  Review of Systems   Constitutional: Negative for fever.   Respiratory: Negative for shortness of breath.    Cardiovascular: Positive for leg swelling. Negative for chest pain.   Musculoskeletal: Negative.    Skin: Negative.        Objective:   /74 (BP Location: Right arm, Patient Position: Sitting, BP Method: Large (Manual))   Pulse 77   Ht 5' 5" (1.651 m)   Wt 100.7 kg (222 lb)   SpO2 95%   BMI 36.94 kg/m²      Physical Exam   Constitutional: She is oriented to person, place, and time. She appears well-developed and well-nourished. No distress.   HENT:   Head: Normocephalic and atraumatic.   Cardiovascular: Normal rate and regular rhythm.   Pulmonary/Chest: Effort normal. No respiratory distress. She has no wheezes. She has no rales.   Neurological: She is alert and oriented to person, place, and time.   Skin: Skin is warm and dry. She is not diaphoretic.   Psychiatric: She has a normal mood and affect. Her behavior is normal.       Assessment:       1. Essential hypertension        Plan:       Cydney Montes was seen today for follow-up.    Diagnoses and all orders for this visit:    Essential hypertension    controlled on current meds, continue      If swelling persists will change amlodipine to nifedipine.     "

## 2019-01-09 ENCOUNTER — OFFICE VISIT (OUTPATIENT)
Dept: OPTOMETRY | Facility: CLINIC | Age: 59
End: 2019-01-09
Payer: COMMERCIAL

## 2019-01-09 ENCOUNTER — CLINICAL SUPPORT (OUTPATIENT)
Dept: OPHTHALMOLOGY | Facility: CLINIC | Age: 59
End: 2019-01-09
Payer: COMMERCIAL

## 2019-01-09 DIAGNOSIS — H52.02 HYPERMETROPIA OF LEFT EYE: ICD-10-CM

## 2019-01-09 DIAGNOSIS — H40.053 OCULAR HYPERTENSION, BILATERAL: Primary | ICD-10-CM

## 2019-01-09 DIAGNOSIS — Z13.5 SCREENING FOR EYE CONDITION: ICD-10-CM

## 2019-01-09 DIAGNOSIS — H25.13 NUCLEAR SCLEROSIS OF BOTH EYES: ICD-10-CM

## 2019-01-09 DIAGNOSIS — H52.4 PRESBYOPIA OF BOTH EYES: ICD-10-CM

## 2019-01-09 DIAGNOSIS — H40.003 GLAUCOMA SUSPECT OF BOTH EYES: ICD-10-CM

## 2019-01-09 DIAGNOSIS — H52.01 HYPEROPIA OF RIGHT EYE WITH ASTIGMATISM: ICD-10-CM

## 2019-01-09 DIAGNOSIS — H52.201 HYPEROPIA OF RIGHT EYE WITH ASTIGMATISM: ICD-10-CM

## 2019-01-09 DIAGNOSIS — D86.9 SARCOIDOSIS: ICD-10-CM

## 2019-01-09 DIAGNOSIS — I10 ESSENTIAL HYPERTENSION: ICD-10-CM

## 2019-01-09 PROCEDURE — 92083 EXTENDED VISUAL FIELD XM: CPT | Mod: S$GLB,,, | Performed by: OPTOMETRIST

## 2019-01-09 PROCEDURE — 99999 PR PBB SHADOW E&M-EST. PATIENT-LVL III: ICD-10-PCS | Mod: PBBFAC,,, | Performed by: OPTOMETRIST

## 2019-01-09 PROCEDURE — 92014 PR EYE EXAM, EST PATIENT,COMPREHESV: ICD-10-PCS | Mod: S$GLB,,, | Performed by: OPTOMETRIST

## 2019-01-09 PROCEDURE — 92015 PR REFRACTION: ICD-10-PCS | Mod: S$GLB,,, | Performed by: OPTOMETRIST

## 2019-01-09 PROCEDURE — 92014 COMPRE OPH EXAM EST PT 1/>: CPT | Mod: S$GLB,,, | Performed by: OPTOMETRIST

## 2019-01-09 PROCEDURE — 99999 PR PBB SHADOW E&M-EST. PATIENT-LVL III: CPT | Mod: PBBFAC,,, | Performed by: OPTOMETRIST

## 2019-01-09 PROCEDURE — 92015 DETERMINE REFRACTIVE STATE: CPT | Mod: S$GLB,,, | Performed by: OPTOMETRIST

## 2019-01-09 PROCEDURE — 92083 HUMPHREY VISUAL FIELD - OU - BOTH EYES: ICD-10-PCS | Mod: S$GLB,,, | Performed by: OPTOMETRIST

## 2019-01-09 NOTE — PATIENT INSTRUCTIONS
Prior diagnosis of bilateral ocular hypertension.  Currently using Combigan Ophthalmic Solution two times per day in both eyes for IOP control.  Intraocular pressure within normal range in each eye.  Repeat HVF test done today.    Reviewed results and discussed with Mrs. Salazar.   No evidence of glaucomatous visual field defect in ether eye today.  Continue to instill Combigan Ophthalmic Solution (one drop two times per day) in both eyes.     Early nuclear sclerosis in both eyes.    History of sarcoidosis, without ophthalmic manifestation     Hyperopia with asitgmatism in the right eye and hyperopia in the left eye, with good correctable VA in each eye.  Presbyopia consistent with age.   New spectacle lens Rx issued for use as desired.    Recheck in one year, with repeat HVF test (24-2 RORY Standard) at that visit.

## 2019-01-09 NOTE — PROGRESS NOTES
HPI     Concerns About Ocular Health      Additional comments: Annual general eye examination.  No acute ocular/vision problems.  Wears OTC reading glasses only.  Distance VA without correction okay.  Prior diagnosis of bilateral ocular hypertension.  Using Combigan bid OU.               Comments     Pt returns for annual check. No complaints    Patient's age: 59 y.o. female  Occupation: [employer]   Approximate date of last eye examination:  11/22/2017  Name of last eye doctor seen: Dr Romero   City/State: [facility]   Wears glasses? No     If yes, wears  Full-time or part-time?  Part time OTC readers  Present glasses are:   SV OTC Readers +2.00  Approximate age of present glasses:  n/a  Got new glasses following last exam, or subsequently?:  NO    Any problem with VA with glasses?  No   Wears CLs?:  No   Headaches?  No   Eye pain/discomfort?  No                                                                                     Flashes?  No   Floaters?  No   Diplopia/Double vision?  No   Patient's Ocular History:          Any eye surgeries? No          Any eye injury?  No          Any treatment for eye disease?  POAG   Family history of eye disease?  No   Significant patient medical history:         1. Diabetes?  No      If yes, IDDM or NIDDM? NA           2. HBP?  Yes, controlled by medication                  ! OTC eyedrops currently using:  No    ! Prescription eye meds currently using:  Combigan 0.20 / 0.5 % BID OU   ! Any history of allergy/adverse reaction to any eye meds used   previously?  No    ! Any history of allergy/adverse reaction to eyedrops used during prior   eye exam(s)? No    ! Any history of allergy/adverse reaction to Novacaine or similar meds?   No    ! Any history of allergy/adverse reaction to Epinephrine or similar meds?   No    ! Patient okay with use of anesthetic eyedrops to check eye pressure?    Yes        ! Patient okay with use of eyedrops to dilate pupils today?  Yes    !  " Allergies/Medications/Medical History/Family History reviewed today?    Yes , Both Parents Had Glaucoma       PD =   65/61  Desired reading distance =    15.25"                            Last edited by Darrius Romero, OD on 1/9/2019  2:48 PM. (History)            Assessment /Plan     For exam results, see Encounter Report.    1. Ocular hypertension, bilateral     2. Glaucoma suspect of both eyes  Nick Visual Field - OU - Extended - Both Eyes   3. Nuclear sclerosis of both eyes     4. Sarcoidosis     5. Essential hypertension     6. Screening for eye condition     7. Hyperopia of right eye with astigmatism     8. Hypermetropia of left eye     9. Presbyopia of both eyes                    Prior diagnosis of bilateral ocular hypertension.  Currently using Combigan Ophthalmic Solution two times per day in both eyes for IOP control.  Intraocular pressure within normal range in each eye.  Repeat HVF test done today.    Reviewed results and discussed with Mrs. Salazar.   No evidence of glaucomatous visual field defect in ether eye today.  Continue to instill Combigan Ophthalmic Solution (one drop two times per day) in both eyes.     Early nuclear sclerosis in both eyes.    History of sarcoidosis without ophthalmic manifestation.    Hyperopia with asitgmatism in the right eye and hyperopia in the left eye, with good correctable VA in each eye.  Presbyopia consistent with age.   New spectacle lens Rx issued for use as desired.    Recheck in one year, with repeat HVF test (24-2 RORY Standard) at that visit.             "

## 2019-01-11 ENCOUNTER — PATIENT MESSAGE (OUTPATIENT)
Dept: INTERNAL MEDICINE | Facility: CLINIC | Age: 59
End: 2019-01-11

## 2019-01-11 DIAGNOSIS — I10 ESSENTIAL HYPERTENSION: Primary | ICD-10-CM

## 2019-01-11 RX ORDER — NIFEDIPINE 30 MG/1
30 TABLET, EXTENDED RELEASE ORAL DAILY
Qty: 30 TABLET | Refills: 11 | Status: SHIPPED | OUTPATIENT
Start: 2019-01-11 | End: 2019-12-11

## 2019-01-14 ENCOUNTER — TELEPHONE (OUTPATIENT)
Dept: INTERNAL MEDICINE | Facility: CLINIC | Age: 59
End: 2019-01-14

## 2019-01-14 NOTE — TELEPHONE ENCOUNTER
----- Message from Nicky Tapia MA sent at 1/12/2019  8:46 AM CST -----  Contact: self [phone]  Pt called to confirm what HBP medication she needs to stop taking, pt stated she was prescribed a new medication but is unsure which one needs to be d/c. Please, advise. Thanks.

## 2019-01-14 NOTE — TELEPHONE ENCOUNTER
Spoke with patient. Patient states that she had seen for a visit sometime in Dec. Patient verbalized that you started her on a new BP medication and was called into her pharmacy; however, patient is asking what medication did she need to stop taking completely?     Please advise.

## 2019-01-14 NOTE — TELEPHONE ENCOUNTER
Spoke with pt, notified that amlodipine was the medication that was dc'd and the new medication to began is nifedipine. Pt verbalized understanding

## 2019-01-16 ENCOUNTER — HOSPITAL ENCOUNTER (OUTPATIENT)
Dept: PULMONOLOGY | Facility: CLINIC | Age: 59
Discharge: HOME | End: 2019-01-16
Payer: COMMERCIAL

## 2019-01-16 ENCOUNTER — OFFICE VISIT (OUTPATIENT)
Dept: PULMONOLOGY | Facility: CLINIC | Age: 59
End: 2019-01-16
Payer: COMMERCIAL

## 2019-01-16 VITALS
WEIGHT: 218 LBS | SYSTOLIC BLOOD PRESSURE: 140 MMHG | BODY MASS INDEX: 36.32 KG/M2 | HEART RATE: 86 BPM | HEIGHT: 65 IN | DIASTOLIC BLOOD PRESSURE: 72 MMHG | OXYGEN SATURATION: 94 %

## 2019-01-16 VITALS — WEIGHT: 219 LBS | BODY MASS INDEX: 36.49 KG/M2 | HEIGHT: 65 IN

## 2019-01-16 DIAGNOSIS — Q25.72 PULMONARY ARTERIOVENOUS MALFORMATION: ICD-10-CM

## 2019-01-16 DIAGNOSIS — D86.9 SARCOIDOSIS: ICD-10-CM

## 2019-01-16 DIAGNOSIS — Q25.72 PULMONARY ARTERIOVENOUS MALFORMATION: Primary | ICD-10-CM

## 2019-01-16 DIAGNOSIS — G47.33 OSA ON CPAP: ICD-10-CM

## 2019-01-16 DIAGNOSIS — D86.0 PULMONARY SARCOIDOSIS: ICD-10-CM

## 2019-01-16 LAB
PRE FEV1 FVC: 76
PRE FEV1: 1.43
PRE FVC: 1.88
PREDICTED FEV1 FVC: 80
PREDICTED FEV1: 2.56
PREDICTED FVC: 3.18

## 2019-01-16 PROCEDURE — 94010 BREATHING CAPACITY TEST: ICD-10-PCS | Mod: S$GLB,,, | Performed by: INTERNAL MEDICINE

## 2019-01-16 PROCEDURE — 94729 PR C02/MEMBANE DIFFUSE CAPACITY: ICD-10-PCS | Mod: S$GLB,,, | Performed by: INTERNAL MEDICINE

## 2019-01-16 PROCEDURE — 94618 PULMONARY STRESS TESTING: ICD-10-PCS | Mod: S$GLB,,, | Performed by: INTERNAL MEDICINE

## 2019-01-16 PROCEDURE — 99999 PR PBB SHADOW E&M-EST. PATIENT-LVL III: CPT | Mod: PBBFAC,,, | Performed by: EMERGENCY MEDICINE

## 2019-01-16 PROCEDURE — 99214 OFFICE O/P EST MOD 30 MIN: CPT | Mod: S$GLB,,, | Performed by: EMERGENCY MEDICINE

## 2019-01-16 PROCEDURE — 94729 DIFFUSING CAPACITY: CPT | Mod: S$GLB,,, | Performed by: INTERNAL MEDICINE

## 2019-01-16 PROCEDURE — 94010 BREATHING CAPACITY TEST: CPT | Mod: S$GLB,,, | Performed by: INTERNAL MEDICINE

## 2019-01-16 PROCEDURE — 99214 PR OFFICE/OUTPT VISIT, EST, LEVL IV, 30-39 MIN: ICD-10-PCS | Mod: S$GLB,,, | Performed by: EMERGENCY MEDICINE

## 2019-01-16 PROCEDURE — 94618 PULMONARY STRESS TESTING: CPT | Mod: S$GLB,,, | Performed by: INTERNAL MEDICINE

## 2019-01-16 PROCEDURE — 99999 PR PBB SHADOW E&M-EST. PATIENT-LVL III: ICD-10-PCS | Mod: PBBFAC,,, | Performed by: EMERGENCY MEDICINE

## 2019-01-16 NOTE — PROGRESS NOTES
Subjective:     Pulmonary & Critical Care Medicine   Clinic Note    HPI:  This is a 59-year-old female with a history of hypertension, KAREY, and sarcoidosis.  She has been followed by Dr. Aguilera for some time.  New to me.  Last clinic visit May 2018..  Per Dr. Aguilera:      Ms. Salazar is a 58-year-old nonsmoker, who returns   for an interval assessment of sarcoidosis.  Her most recent previous visit here   was in June 2016.  At that time, she had been having recurrent episodes of   hemoptysis.  She had a CT angiogram, which showed an AV malformation within the   right middle lobe.  This was subsequently treated by embolization on two   occasions.  She has not had any further episodes of hemoptysis.     Martin feels that her respiratory status has been stable.  She is not currently   using any bronchodilator medications.  She has not been on any systemic   treatment for sarcoidosis during the past several years.  She is not having any   symptoms to suggest pulmonary or extrapulmonary disease activity related to   sarcoidosis.      Interval History:  Since prior visit she has followed up with IR. Repeat embolization. CT with enlarging left pulmonary AVM being followed with serial CT at this point. She has noted some more exertional dyspnea.. Very anxious about her diagnosis and scared to exercise as she is concerned something may happen.. More sedentary and increased weight gain. No hemoptysis. No chest pain. Only additional complaint is nasal congestion/rhinitis.     Daughter getting  in April   Works at [employer] x 35 years     Pulmonary History:   Sarcoid diagnosed about 20 years prior. Initial diagnosis at [facility] [place], [place].. Prednisone for about 11 years. No additional therapy.. Off IS for some time.   Followed by Dr. Aguilera x 15 years.   No vision problems, constitutional symptoms.       Past Medical History:   Diagnosis Date    Arteriovenous malformation (AVM) 05/2016    right middle lobe    Cataract     Glaucoma      "Hypertension     Sarcoidosis     in remission    Sleep apnea     uses cpap     Past Surgical History:   Procedure Laterality Date     SECTION      Embolization N/A 2018    [facility]    EMBOLIZATION N/A 2017    [facility]    EMBOLIZATION N/A 2017    [facility]    ir embolization of pulmonary AVM       Social History-   , works for [employer].   Daughter (getting  in April)   No occupational or environmental exposures.     Drug Allergies:   No Known Allergies    Review of Systems:     Constitutional: Negative for fever, chills, diaphoresis, appetite change and fatigue. Decreased activity    HENT: Negative for drooling, facial swelling, hearing loss, tinnitus, trouble swallowing and voice change. Rhinitis per above     Eyes: Negative for photophobia, pain and visual disturbance.   Respiratory: Negative for cough, chest tightness. Positive per HPI. No hemoptysis.   Cardiovascular: Negative for chest pain, palpitations. Trace edema (attributed to Norvasc.. Just D/Chevy)   Gastrointestinal: Negative for nausea, vomiting, abdominal pain, diarrhea and abdominal distention.   Endocrine: Negative for cold intolerance, heat intolerance, polydipsia and polyuria.   Genitourinary: Negative for dysuria, frequency and hematuria.   Musculoskeletal: Negative for myalgias, back pain, arthralgias, neck pain and neck stiffness.   Skin: Negative for color change, pallor, rash and wound.   Allergic/Immunologic: Negative for immunocompromised state.   Neurological: Negative for dizziness, weakness and headaches.    A comprehensive 12-point review of systems was performed, and is negative except for those items mentioned above in the HPI section of this note.     Vital Signs:    BP (!) 140/72 (BP Location: Right arm, Patient Position: Sitting)   Pulse 86   Ht 5' 5" (1.651 m)   Wt 98.9 kg (218 lb)   SpO2 (!) 94%   " BMI 36.28 kg/m²      Physical Exam:   GEN- NAD AAOx3 Well Built, Well Appearing   HEENT- ATNC, PERRLA, EOMI, OP-Cl. No JVD, LAD or bruit noted. Trachea Midline.   CV- RRR No M/R/G  RESP- CTA-Bilateral   GI- S/NT/ND. Positive BS X 4. No HSM Noted  BACK- Spine midline. No step off, crepitus or deformity noted. No midline TTP.   Ext- MAEW, No deformity. No rashes noted. Trace edema bilateral LE.    Neuro- Strength 5/5 symmetric. CN 2-12 intact. Normal gait. Normal sensation.      Personal Review and Summary of Prior Diagnostics    Laboratory Studies:   Reviewed       Microbiology Data:   Microbiology Results (last 7 days)     ** No results found for the last 168 hours. **        Summary of Chest Imaging Personally Reviewed:  Scattered calcified and non-calcified pulmonary nodules, which are stable. JANE scaring. No significant mediastinal adenopathy. RML embolization coils. No significant radiographic progression of sarcoid.     Interval partial embolization of right middle lobe pulmonary AVM which displays continued enhancement and draining veins.  Of note, there may be new recruitment of the right superior phrenic and right bronchial arteries      2D Echo: None     PFT's:     FEV1/FVC- 78  FEV1- 1.66L (64)   FVC- 2.14L (67)  TLC- 64   DLCO- 77     Today--     FEV1/FVC- 76  FEV1- 1.43L (56)   FVC- 1.88L (60)  DLCO- 71     Assessment:       1. Pulmonary arteriovenous malformation    2. Pulmonary sarcoidosis    3. KAREY on CPAP        Outpatient Encounter Medications as of 1/16/2019   Medication Sig Dispense Refill    albuterol (PROAIR HFA) 90 mcg/actuation HFAA Inhale into the lungs. 2 Aerosol Inhalation Every 3-4 hours      atorvastatin (LIPITOR) 40 MG tablet take 1 tablet by mouth every evening 90 tablet 3    brimonidine-timolol (COMBIGAN) 0.2-0.5 % Drop Place 1 drop into both eyes 2 (two) times daily. 5 mL 5    fluticasone (FLONASE) 50 mcg/actuation nasal spray 1-2 sprays @ nostril once a day 1 Bottle 1     HYDROcodone-acetaminophen (NORCO) 5-325 mg per tablet Take 1-2 tablets by mouth every 6 (six) hours as needed for Pain. 24 tablet 0    losartan-hydrochlorothiazide 100-25 mg (HYZAAR) 100-25 mg per tablet Take 1 tablet by mouth once daily. 90 tablet 3    NIFEdipine (PROCARDIA-XL) 30 MG (OSM) 24 hr tablet Take 1 tablet (30 mg total) by mouth once daily. 30 tablet 11     No facility-administered encounter medications on file as of 1/16/2019.      Orders Placed This Encounter   Procedures    CT Head With Contrast     Standing Status:   Future     Standing Expiration Date:   1/16/2020     Order Specific Question:   Is the patient allergic to iodine or contrast? Has a steroid / antihistamine prep been administered?     Answer:   No     Order Specific Question:   Is the patient on ANY Metformin drug such as Glucophage/Glucovance?           Should be off drug 48 hours after contrast. Check renal function before restart.     Answer:   No     Order Specific Question:   History of Kidney Disease - including: decreased kidney function, dialysis, kidney transplay, single kidney, kidney cancer, kidney surgery?     Answer:   None     Order Specific Question:   Does the patient have high blood pressure requiring medical treatment?     Answer:   Yes     Order Specific Question:   Diabetes?     Answer:   No     Order Specific Question:   May the Radiologist modify the order per protocol to meet the clinical needs of the patient?     Answer:   Yes     Order Specific Question:   Will this service be billed to a Worker's Comp policy?     Answer:   No    Basic metabolic panel     Standing Status:   Future     Standing Expiration Date:   3/16/2020    2D echo with color flow doppler and bubble contrast     Known pulmonary AVMs.. Looking for shunt fraction     Standing Status:   Future     Standing Expiration Date:   1/16/2020    Transthoracic echo (TTE) complete (Cupid Only)     Patient with known pulmonary AVM.. Color doppler with  bubble study to evaluate for degree of intrapulmonary shunting.     Standing Status:   Future     Standing Expiration Date:   1/16/2020     Order Specific Question:   Test type     Answer:   Contrast     Order Specific Question:   Test type     Answer:   Doppler     Order Specific Question:   Test type     Answer:   Color     Order Specific Question:   Type of contrast     Answer:   Choice     Comments:   Bubble study      Order Specific Question:   Adult congenital patient?     Answer:   No    Stress test, pulmonary     Standing Status:   Future     Number of Occurrences:   1     Standing Expiration Date:   1/16/2020       Plan:     1. Sarcoidosis- Radiographs reviewed. No progression of parenchymal changes. PFTs with slight reduction, but DLCO is preserved.. Suspect likely reflective of increased weight gain. Is more SOB but I suspect more a result of deconditioning/AVMs than active sarcoid at this point. Will focus on working up AVMs as below.. 6MWT.. Repeat PFTs on follow up. No need for addition of immunosuppression at this point.       2. Pulmonary AVM- etiology unclear.. Does not meet diagnostic criteria for HHT... Perhaps idiopathic??.. They have continued to enlarge.. Unsure how much they are contributing to current symptoms.. Plan for ECHO with bubble study to determine shunt fraction. Follow up repeat CT scan and embolization per IR if needed. CT brain with contrast to exclude cerebral AVM.. No evidence of hepatic disease.      3. KAREY-- Continue CPAP    4. HTN- medical management per PCP      Plan for follow up in 2-3 months with above. She has my contact information and can call in the interim if any issues.     Mendel Owen M.D.   [facility]

## 2019-01-18 NOTE — PROCEDURES
Cydney Salazar is a 59 y.o.  female patient, who presents for a 6 minute walk test ordered by Mendel wOen MD.  The diagnosis is Sarcoidosis.  The patient's BMI is 36.5 kg/m2.  Predicted distance (lower limit of normal) is 317.93 meters.      Test Results:    The test was completed without stopping.  The total time walked was 360 seconds.  During walking, the patient reported:  Dyspnea.  The patient used no assistive devices during testing.     01/16/2019---------Distance: 466.95 meters (1532 feet)     O2 Sat % Supplemental Oxygen Heart Rate Blood Pressure Patrick Scale   Pre-exercise  (Resting) 96 % Room Air 76 bpm 159/83 mmHg 0   During Exercise 89 % Room Air 125 bpm 195/87 mmHg 4   Post-exercise  (Recovery) 98 % Room Air  84 bpm 174/80 mmHg      Recovery Time:  270 seconds    Performing nurse/tech:  Shell BROWN      PREVIOUS STUDY:   The patient has not had a previous study.      CLINICAL INTERPRETATION:  Six minute walk distance is 466.95 meters (1532 feet) with somewhat heavy dyspnea.  During exercise, there was significant desaturation while breathing room air.  Both blood pressure and heart rate increased significantly with walking.  Hypertension was present prior to exercise.  The patient did not report non-pulmonary symptoms during exercise.  No previous study performed.  Based upon age and body mass index, exercise capacity is normal.

## 2019-01-23 ENCOUNTER — HOSPITAL ENCOUNTER (OUTPATIENT)
Dept: RADIOLOGY | Facility: HOSPITAL | Age: 59
Discharge: HOME | End: 2019-01-23
Attending: NURSE PRACTITIONER
Payer: COMMERCIAL

## 2019-01-23 DIAGNOSIS — Q25.72 PULMONARY ARTERIOVENOUS MALFORMATION: ICD-10-CM

## 2019-01-23 PROCEDURE — 71275 CT ANGIOGRAPHY CHEST: CPT | Mod: TC

## 2019-01-23 PROCEDURE — 25500020 PHARM REV CODE 255: Performed by: NURSE PRACTITIONER

## 2019-01-23 PROCEDURE — 71275 CT ANGIOGRAPHY CHEST: CPT | Mod: 26,,, | Performed by: RADIOLOGY

## 2019-01-23 PROCEDURE — 71275 CTA CHEST NON CORONARY: ICD-10-PCS | Mod: 26,,, | Performed by: RADIOLOGY

## 2019-01-23 RX ADMIN — IOHEXOL 100 ML: 350 INJECTION, SOLUTION INTRAVENOUS at 09:01

## 2019-01-28 ENCOUNTER — HOSPITAL ENCOUNTER (OUTPATIENT)
Dept: CARDIOLOGY | Facility: CLINIC | Age: 59
Discharge: HOME | End: 2019-01-28
Attending: EMERGENCY MEDICINE
Payer: COMMERCIAL

## 2019-01-28 VITALS
WEIGHT: 219 LBS | SYSTOLIC BLOOD PRESSURE: 172 MMHG | HEART RATE: 85 BPM | DIASTOLIC BLOOD PRESSURE: 80 MMHG | HEIGHT: 65 IN | BODY MASS INDEX: 36.49 KG/M2

## 2019-01-28 DIAGNOSIS — Q25.72 PULMONARY ARTERIOVENOUS MALFORMATION: ICD-10-CM

## 2019-01-28 LAB
ASCENDING AORTA: 3.04 CM
AV INDEX (PROSTH): 0.74
AV MEAN GRADIENT: 5.65 MMHG
AV PEAK GRADIENT: 10.24 MMHG
AV VALVE AREA: 2.34 CM2
AV VELOCITY RATIO: 0.75
BSA FOR ECHO PROCEDURE: 2.13 M2
CV ECHO LV RWT: 0.42 CM
DOP CALC AO PEAK VEL: 1.6 M/S
DOP CALC AO VTI: 35.49 CM
DOP CALC LVOT AREA: 3.17 CM2
DOP CALC LVOT DIAMETER: 2.01 CM
DOP CALC LVOT PEAK VEL: 1.2 M/S
DOP CALC LVOT STROKE VOLUME: 83.03 CM3
DOP CALCLVOT PEAK VEL VTI: 26.18 CM
E WAVE DECELERATION TIME: 207.21 MSEC
E/A RATIO: 1
E/E' RATIO: 13.65
ECHO LV POSTERIOR WALL: 0.83 CM (ref 0.6–1.1)
FRACTIONAL SHORTENING: 34 % (ref 28–44)
INTERVENTRICULAR SEPTUM: 0.74 CM (ref 0.6–1.1)
IVRT: 0.11 MSEC
LA MAJOR: 6.06 CM
LA MINOR: 5.87 CM
LA WIDTH: 4.16 CM
LEFT ATRIUM SIZE: 3.27 CM
LEFT ATRIUM VOLUME INDEX: 33.5 ML/M2
LEFT ATRIUM VOLUME: 68.95 CM3
LEFT INTERNAL DIMENSION IN SYSTOLE: 2.62 CM (ref 2.1–4)
LEFT VENTRICLE DIASTOLIC VOLUME INDEX: 33.91 ML/M2
LEFT VENTRICLE DIASTOLIC VOLUME: 69.72 ML
LEFT VENTRICLE MASS INDEX: 44.1 G/M2
LEFT VENTRICLE SYSTOLIC VOLUME INDEX: 12.2 ML/M2
LEFT VENTRICLE SYSTOLIC VOLUME: 25.12 ML
LEFT VENTRICULAR INTERNAL DIMENSION IN DIASTOLE: 3.99 CM (ref 3.5–6)
LEFT VENTRICULAR MASS: 90.75 G
LV LATERAL E/E' RATIO: 12.89
LV SEPTAL E/E' RATIO: 14.5
MV PEAK A VEL: 1.16 M/S
MV PEAK E VEL: 1.16 M/S
PISA TR MAX VEL: 3.78 M/S
PULM VEIN S/D RATIO: 1.07
PV PEAK D VEL: 0.57 M/S
PV PEAK S VEL: 0.61 M/S
PV PEAK VELOCITY: 0.81 CM/S
RA MAJOR: 5.25 CM
RA PRESSURE: 3 MMHG
RA WIDTH: 3.86 CM
RIGHT VENTRICULAR END-DIASTOLIC DIMENSION: 2.99 CM
RV TISSUE DOPPLER FREE WALL SYSTOLIC VELOCITY 1 (APICAL 4 CHAMBER VIEW): 11.33 M/S
SINUS: 3.04 CM
STJ: 2.69 CM
TDI LATERAL: 0.09
TDI SEPTAL: 0.08
TDI: 0.09
TR MAX PG: 57.15 MMHG
TRICUSPID ANNULAR PLANE SYSTOLIC EXCURSION: 2.61 CM
TV REST PULMONARY ARTERY PRESSURE: 60 MMHG

## 2019-01-28 PROCEDURE — 93306 TTE W/DOPPLER COMPLETE: CPT | Mod: S$GLB,,, | Performed by: INTERNAL MEDICINE

## 2019-01-28 PROCEDURE — 93306 TRANSTHORACIC ECHO (TTE) COMPLETE (CUPID ONLY): ICD-10-PCS | Mod: S$GLB,,, | Performed by: INTERNAL MEDICINE

## 2019-01-28 NOTE — NURSING
Patient identified by 2 identifiers.   Allergies reviewed.  22 g IV placed to Lt AC hand.  Bubble study explained to patient, patient verbalized understanding.  Bubble performed X 2, with & without Valsalva.  Pt tolerated procedure well.  IV d/c with catheter intact, pressure dsg applied.

## 2019-01-30 ENCOUNTER — OFFICE VISIT (OUTPATIENT)
Dept: INTERVENTIONAL RADIOLOGY/VASCULAR | Facility: CLINIC | Age: 59
End: 2019-01-30
Payer: COMMERCIAL

## 2019-01-30 DIAGNOSIS — R06.02 SOB (SHORTNESS OF BREATH): ICD-10-CM

## 2019-01-30 DIAGNOSIS — Q25.72 PULMONARY ARTERIOVENOUS MALFORMATION: Primary | ICD-10-CM

## 2019-01-30 PROCEDURE — 99999 PR PBB SHADOW E&M-EST. PATIENT-LVL III: CPT | Mod: PBBFAC,,, | Performed by: FAMILY MEDICINE

## 2019-01-30 PROCEDURE — 99213 OFFICE O/P EST LOW 20 MIN: CPT | Mod: S$GLB,,, | Performed by: FAMILY MEDICINE

## 2019-01-30 PROCEDURE — 99999 PR PBB SHADOW E&M-EST. PATIENT-LVL III: ICD-10-PCS | Mod: PBBFAC,,, | Performed by: FAMILY MEDICINE

## 2019-01-30 PROCEDURE — 99213 PR OFFICE/OUTPT VISIT, EST, LEVL III, 20-29 MIN: ICD-10-PCS | Mod: S$GLB,,, | Performed by: FAMILY MEDICINE

## 2019-02-01 VITALS
WEIGHT: 223.81 LBS | HEART RATE: 80 BPM | BODY MASS INDEX: 37.29 KG/M2 | SYSTOLIC BLOOD PRESSURE: 140 MMHG | DIASTOLIC BLOOD PRESSURE: 74 MMHG | HEIGHT: 65 IN

## 2019-02-01 NOTE — PROGRESS NOTES
Subjective:       Patient ID: Cydney Salazar is a 59 y.o. female.    Chief Complaint: Shortness of Breath and Pulmonary AVM    Patient here for follow up of pulmonary arteriovenous malformations that were last treated on 7/6/2018. She denies any hemoptysis, but does have complaints of SOB with bending over and walking. She tells me she is unsure if this is due to her AVMs or being overweight. She admits she recently had a stress test that was negative. She is also in care with a pulmonologist. She had a CTA of the chest on 1/23/2019.      Review of Systems   Constitutional: Negative for activity change, appetite change, chills, fatigue and fever.   Respiratory: Positive for shortness of breath. Negative for cough, wheezing and stridor.    Cardiovascular: Negative for chest pain, palpitations and leg swelling.       Objective:      Physical Exam   Constitutional: She is oriented to person, place, and time. She appears well-developed and well-nourished. No distress.   Cardiovascular: Normal rate, regular rhythm and normal heart sounds. Exam reveals no gallop and no friction rub.   No murmur heard.  Pulmonary/Chest: Effort normal and breath sounds normal. No stridor. No respiratory distress. She has no wheezes. She has no rales.   Neurological: She is alert and oriented to person, place, and time.   Skin: Skin is warm and dry. She is not diaphoretic.   Psychiatric: She has a normal mood and affect.   Vitals reviewed.      Imaging: CTA 1/23/2019  Impression       1.  Redemonstration of radiopaque material within the right middle lobe consistent with the patient's history of prior AVM embolization with no evidence of contrast extravasation.    2.  There are multiple areas of subsegmental parenchymal consolidation with enlarged vessels which may represent additional areas of arteriovenous fistulas as further detailed above.    3.  Soft tissue opacity around the bronchus intermedius which may represent hypertrophied  bronchial arteries.  There is a bronchial artery arising from the aorta anteriorly and coursing through subcarinal mediastinum and the soft tissue adjacent to the bronchus intermedius.  Details on the size and course of this artery are provided above.    4.  Repeat pulmonary angiography and/or bronchial arteriography may clarify the presence or absence of the additional arteriovenous malformations or systemic arterial supply to the lungs.     Assessment:       1. Pulmonary arteriovenous malformation    2. SOB (shortness of breath)        Plan:         Dr. Ferreira reviewed CTA images and findings with patient. Explained he recommends repeating angiogram to better visualize vessels. This will help to determine if further intervention is needed. He reassured her that intervention can be performed immediately after angiogram. Discussed how the procedure will be performed, risk/benefits, possible complications, pre-post procedure expectations, and alternatives. The patient voices understanding and all questions have been answered.  The patient agrees to proceed as planned. Patient consented. We will call to schedule once the physician's schedule has been posted. Clinic phone number provided.

## 2019-02-04 ENCOUNTER — PATIENT MESSAGE (OUTPATIENT)
Dept: INTERNAL MEDICINE | Facility: CLINIC | Age: 59
End: 2019-02-04

## 2019-02-05 ENCOUNTER — PATIENT MESSAGE (OUTPATIENT)
Dept: INTERNAL MEDICINE | Facility: CLINIC | Age: 59
End: 2019-02-05

## 2019-02-06 ENCOUNTER — HOSPITAL ENCOUNTER (OUTPATIENT)
Dept: RADIOLOGY | Facility: HOSPITAL | Age: 59
Discharge: HOME | End: 2019-02-06
Attending: EMERGENCY MEDICINE
Payer: COMMERCIAL

## 2019-02-06 DIAGNOSIS — Q25.72 PULMONARY ARTERIOVENOUS MALFORMATION: ICD-10-CM

## 2019-02-06 LAB
CREAT SERPL-MCNC: 1 MG/DL (ref 0.5–1.4)
SAMPLE: NORMAL

## 2019-02-06 PROCEDURE — 70470 CT HEAD/BRAIN W/O & W/DYE: CPT | Mod: 26,,, | Performed by: RADIOLOGY

## 2019-02-06 PROCEDURE — 70470 CT HEAD/BRAIN W/O & W/DYE: CPT | Mod: TC

## 2019-02-06 PROCEDURE — 70470 CT HEAD WITH AND WITHOUT: ICD-10-PCS | Mod: 26,,, | Performed by: RADIOLOGY

## 2019-02-06 PROCEDURE — 25500020 PHARM REV CODE 255: Performed by: EMERGENCY MEDICINE

## 2019-02-06 RX ADMIN — IOHEXOL 75 ML: 350 INJECTION, SOLUTION INTRAVENOUS at 09:02

## 2019-02-12 DIAGNOSIS — Q25.72 PULMONARY ARTERIOVENOUS MALFORMATION: Primary | ICD-10-CM

## 2019-02-24 ENCOUNTER — PATIENT MESSAGE (OUTPATIENT)
Dept: PULMONOLOGY | Facility: CLINIC | Age: 59
End: 2019-02-24

## 2019-03-25 ENCOUNTER — TELEPHONE (OUTPATIENT)
Dept: INTERNAL MEDICINE | Facility: CLINIC | Age: 59
End: 2019-03-25

## 2019-03-25 NOTE — TELEPHONE ENCOUNTER
Spoke to patient. Patient states that she reports of nasal congestion, cough (brown now green phlegm) and nosebleed. Symptoms persists for about 3 days, starting on Fri. Patient mentioned that the last time this happened you had prescribed a cough syrup or antibiotic to help. Pt asks what can she do to clear up sx before her procedure?    Please advise.

## 2019-03-25 NOTE — TELEPHONE ENCOUNTER
----- Message from Kellee Staples sent at 3/25/2019  1:22 PM CDT -----  Contact: Patient [phone]  Patient would like to get medical advice.  Symptoms (please be specific):  Nasal and chest congestion and runny nose with blood  How long has patient had these symptoms:  3 days  Pharmacy name and phone # [facility] [address] [address] [address] AT [facility] [address] [phone] (Phone)  [phone] (Fax)      Comments:  She took robitussin D cough syrup Saturday and it helped just a little

## 2019-03-25 NOTE — TELEPHONE ENCOUNTER
Recommend zyrtec, mucinex and robitussin over the counter.   Would need to be evaluated for any prescription cough syrup.

## 2019-03-26 ENCOUNTER — TELEPHONE (OUTPATIENT)
Dept: INTERVENTIONAL RADIOLOGY/VASCULAR | Facility: HOSPITAL | Age: 59
End: 2019-03-26

## 2019-03-26 RX ORDER — MIDAZOLAM HYDROCHLORIDE 1 MG/ML
1 INJECTION INTRAMUSCULAR; INTRAVENOUS
Status: CANCELLED | OUTPATIENT
Start: 2019-03-26

## 2019-03-26 RX ORDER — FENTANYL CITRATE 50 UG/ML
50 INJECTION, SOLUTION INTRAMUSCULAR; INTRAVENOUS
Status: CANCELLED | OUTPATIENT
Start: 2019-03-26

## 2019-03-26 NOTE — PRE-PROCEDURE INSTRUCTIONS
Preop instructions: NPO solids/ milk products after midnight or clears up to 2 hours before arrival (clear liquids are: water, apple juice, Gatorade & Jell-O, black coffee/no milk, cream or creamer), shower instructions, directions, leave all valuables at home, medication instructions for PM prior & am of procedure explained. Patient stated an understanding.      Patient denies any side effects or issues with anesthesia or sedation.

## 2019-03-26 NOTE — TELEPHONE ENCOUNTER
Contacted pt to explain  suggested the use of otc products such as Zyrtec, Mucinex or Robitussin and if that doesn't resolve her sx's the she can schedule an appt for further eval and Rx.  She verbalized she understood and had no further questions.

## 2019-03-27 ENCOUNTER — ANESTHESIA (OUTPATIENT)
Dept: ENDOSCOPY | Facility: HOSPITAL | Age: 59
End: 2019-03-27
Payer: COMMERCIAL

## 2019-03-27 ENCOUNTER — ANESTHESIA EVENT (OUTPATIENT)
Dept: ENDOSCOPY | Facility: HOSPITAL | Age: 59
End: 2019-03-27
Payer: COMMERCIAL

## 2019-03-27 ENCOUNTER — HOSPITAL ENCOUNTER (OUTPATIENT)
Facility: HOSPITAL | Age: 59
Discharge: HOME | End: 2019-03-27
Attending: RADIOLOGY | Admitting: RADIOLOGY
Payer: COMMERCIAL

## 2019-03-27 VITALS
BODY MASS INDEX: 35.36 KG/M2 | HEIGHT: 66 IN | OXYGEN SATURATION: 96 % | SYSTOLIC BLOOD PRESSURE: 148 MMHG | RESPIRATION RATE: 16 BRPM | DIASTOLIC BLOOD PRESSURE: 83 MMHG | HEART RATE: 82 BPM | WEIGHT: 220 LBS | TEMPERATURE: 98 F

## 2019-03-27 DIAGNOSIS — Q25.72 PULMONARY ARTERIOVENOUS MALFORMATION: ICD-10-CM

## 2019-03-27 DIAGNOSIS — R06.02 SOB (SHORTNESS OF BREATH): ICD-10-CM

## 2019-03-27 LAB
ALBUMIN SERPL BCP-MCNC: 3.6 G/DL (ref 3.5–5.2)
ALP SERPL-CCNC: 86 U/L (ref 55–135)
ALT SERPL W/O P-5'-P-CCNC: 20 U/L (ref 10–44)
ANION GAP SERPL CALC-SCNC: 10 MMOL/L (ref 8–16)
ANISOCYTOSIS BLD QL SMEAR: SLIGHT
AST SERPL-CCNC: 21 U/L (ref 10–40)
BASOPHILS # BLD AUTO: 0.03 K/UL (ref 0–0.2)
BASOPHILS NFR BLD: 0.5 % (ref 0–1.9)
BILIRUB SERPL-MCNC: 0.3 MG/DL (ref 0.1–1)
BUN SERPL-MCNC: 15 MG/DL (ref 6–20)
CALCIUM SERPL-MCNC: 9.5 MG/DL (ref 8.7–10.5)
CHLORIDE SERPL-SCNC: 105 MMOL/L (ref 95–110)
CO2 SERPL-SCNC: 28 MMOL/L (ref 23–29)
CREAT SERPL-MCNC: 0.7 MG/DL (ref 0.5–1.4)
DIFFERENTIAL METHOD: ABNORMAL
EOSINOPHIL # BLD AUTO: 0.2 K/UL (ref 0–0.5)
EOSINOPHIL NFR BLD: 3.4 % (ref 0–8)
ERYTHROCYTE [DISTWIDTH] IN BLOOD BY AUTOMATED COUNT: 15.5 % (ref 11.5–14.5)
EST. GFR  (AFRICAN AMERICAN): >60 ML/MIN/1.73 M^2
EST. GFR  (NON AFRICAN AMERICAN): >60 ML/MIN/1.73 M^2
GLUCOSE SERPL-MCNC: 123 MG/DL (ref 70–110)
HCT VFR BLD AUTO: 34.9 % (ref 37–48.5)
HGB BLD-MCNC: 11.3 G/DL (ref 12–16)
HYPOCHROMIA BLD QL SMEAR: ABNORMAL
IMM GRANULOCYTES # BLD AUTO: 0.02 K/UL (ref 0–0.04)
IMM GRANULOCYTES NFR BLD AUTO: 0.4 % (ref 0–0.5)
INR PPP: 0.9 (ref 0.8–1.2)
LYMPHOCYTES # BLD AUTO: 1.9 K/UL (ref 1–4.8)
LYMPHOCYTES NFR BLD: 34.3 % (ref 18–48)
MCH RBC QN AUTO: 28.5 PG (ref 27–31)
MCHC RBC AUTO-ENTMCNC: 32.4 G/DL (ref 32–36)
MCV RBC AUTO: 88 FL (ref 82–98)
MONOCYTES # BLD AUTO: 0.5 K/UL (ref 0.3–1)
MONOCYTES NFR BLD: 8 % (ref 4–15)
NEUTROPHILS # BLD AUTO: 3 K/UL (ref 1.8–7.7)
NEUTROPHILS NFR BLD: 53.4 % (ref 38–73)
NRBC BLD-RTO: 0 /100 WBC
OVALOCYTES BLD QL SMEAR: ABNORMAL
PLATELET # BLD AUTO: 250 K/UL (ref 150–350)
PMV BLD AUTO: 10.1 FL (ref 9.2–12.9)
POIKILOCYTOSIS BLD QL SMEAR: SLIGHT
POLYCHROMASIA BLD QL SMEAR: ABNORMAL
POTASSIUM SERPL-SCNC: 3.4 MMOL/L (ref 3.5–5.1)
PROT SERPL-MCNC: 7.2 G/DL (ref 6–8.4)
PROTHROMBIN TIME: 9.8 SEC (ref 9–12.5)
RBC # BLD AUTO: 3.96 M/UL (ref 4–5.4)
SODIUM SERPL-SCNC: 143 MMOL/L (ref 136–145)
WBC # BLD AUTO: 5.63 K/UL (ref 3.9–12.7)

## 2019-03-27 PROCEDURE — 80053 COMPREHEN METABOLIC PANEL: CPT

## 2019-03-27 PROCEDURE — 25500020 PHARM REV CODE 255: Performed by: RADIOLOGY

## 2019-03-27 PROCEDURE — 25000003 PHARM REV CODE 250: Performed by: FAMILY MEDICINE

## 2019-03-27 PROCEDURE — 37000009 HC ANESTHESIA EA ADD 15 MINS

## 2019-03-27 PROCEDURE — 71000033 HC RECOVERY, INTIAL HOUR

## 2019-03-27 PROCEDURE — 63600175 PHARM REV CODE 636 W HCPCS: Performed by: RADIOLOGY

## 2019-03-27 PROCEDURE — 63600175 PHARM REV CODE 636 W HCPCS: Performed by: NURSE ANESTHETIST, CERTIFIED REGISTERED

## 2019-03-27 PROCEDURE — 71000039 HC RECOVERY, EACH ADD'L HOUR

## 2019-03-27 PROCEDURE — 94761 N-INVAS EAR/PLS OXIMETRY MLT: CPT

## 2019-03-27 PROCEDURE — 85610 PROTHROMBIN TIME: CPT

## 2019-03-27 PROCEDURE — D9220A PRA ANESTHESIA: ICD-10-PCS | Mod: ,,, | Performed by: ANESTHESIOLOGY

## 2019-03-27 PROCEDURE — D9220A PRA ANESTHESIA: Mod: ,,, | Performed by: ANESTHESIOLOGY

## 2019-03-27 PROCEDURE — 25000003 PHARM REV CODE 250: Performed by: NURSE ANESTHETIST, CERTIFIED REGISTERED

## 2019-03-27 PROCEDURE — 27000221 HC OXYGEN, UP TO 24 HOURS

## 2019-03-27 PROCEDURE — 37000008 HC ANESTHESIA 1ST 15 MINUTES

## 2019-03-27 PROCEDURE — 85025 COMPLETE CBC W/AUTO DIFF WBC: CPT

## 2019-03-27 RX ORDER — CEFAZOLIN SODIUM 1 G/3ML
1 INJECTION, POWDER, FOR SOLUTION INTRAMUSCULAR; INTRAVENOUS ONCE
Status: COMPLETED | OUTPATIENT
Start: 2019-03-27 | End: 2019-03-27

## 2019-03-27 RX ORDER — LIDOCAINE HCL/PF 100 MG/5ML
SYRINGE (ML) INTRAVENOUS
Status: DISCONTINUED | OUTPATIENT
Start: 2019-03-27 | End: 2019-03-27

## 2019-03-27 RX ORDER — FENTANYL CITRATE 50 UG/ML
25 INJECTION, SOLUTION INTRAMUSCULAR; INTRAVENOUS EVERY 5 MIN PRN
Status: DISCONTINUED | OUTPATIENT
Start: 2019-03-27 | End: 2019-03-27 | Stop reason: HOSPADM

## 2019-03-27 RX ORDER — SUCCINYLCHOLINE CHLORIDE 20 MG/ML
INJECTION INTRAMUSCULAR; INTRAVENOUS
Status: DISCONTINUED | OUTPATIENT
Start: 2019-03-27 | End: 2019-03-27

## 2019-03-27 RX ORDER — CEFAZOLIN SODIUM 1 G/3ML
INJECTION, POWDER, FOR SOLUTION INTRAMUSCULAR; INTRAVENOUS
Status: DISCONTINUED | OUTPATIENT
Start: 2019-03-27 | End: 2019-03-27

## 2019-03-27 RX ORDER — FENTANYL CITRATE 50 UG/ML
INJECTION, SOLUTION INTRAMUSCULAR; INTRAVENOUS
Status: DISCONTINUED | OUTPATIENT
Start: 2019-03-27 | End: 2019-03-27

## 2019-03-27 RX ORDER — SODIUM CHLORIDE 9 MG/ML
500 INJECTION, SOLUTION INTRAVENOUS ONCE
Status: COMPLETED | OUTPATIENT
Start: 2019-03-27 | End: 2019-03-27

## 2019-03-27 RX ORDER — PROPOFOL 10 MG/ML
VIAL (ML) INTRAVENOUS
Status: DISCONTINUED | OUTPATIENT
Start: 2019-03-27 | End: 2019-03-27

## 2019-03-27 RX ORDER — MIDAZOLAM HYDROCHLORIDE 1 MG/ML
INJECTION, SOLUTION INTRAMUSCULAR; INTRAVENOUS
Status: DISCONTINUED | OUTPATIENT
Start: 2019-03-27 | End: 2019-03-27

## 2019-03-27 RX ORDER — EPHEDRINE SULFATE 50 MG/ML
INJECTION, SOLUTION INTRAVENOUS
Status: DISCONTINUED | OUTPATIENT
Start: 2019-03-27 | End: 2019-03-27

## 2019-03-27 RX ORDER — ONDANSETRON 2 MG/ML
INJECTION INTRAMUSCULAR; INTRAVENOUS
Status: DISCONTINUED | OUTPATIENT
Start: 2019-03-27 | End: 2019-03-27

## 2019-03-27 RX ORDER — SODIUM CHLORIDE 0.9 % (FLUSH) 0.9 %
3 SYRINGE (ML) INJECTION
Status: DISCONTINUED | OUTPATIENT
Start: 2019-03-27 | End: 2019-03-27 | Stop reason: HOSPADM

## 2019-03-27 RX ORDER — SODIUM CHLORIDE 9 MG/ML
INJECTION, SOLUTION INTRAVENOUS CONTINUOUS PRN
Status: DISCONTINUED | OUTPATIENT
Start: 2019-03-27 | End: 2019-03-27

## 2019-03-27 RX ORDER — PHENYLEPHRINE HYDROCHLORIDE 10 MG/ML
INJECTION INTRAVENOUS
Status: DISCONTINUED | OUTPATIENT
Start: 2019-03-27 | End: 2019-03-27

## 2019-03-27 RX ORDER — ROCURONIUM BROMIDE 10 MG/ML
INJECTION, SOLUTION INTRAVENOUS
Status: DISCONTINUED | OUTPATIENT
Start: 2019-03-27 | End: 2019-03-27

## 2019-03-27 RX ADMIN — MIDAZOLAM HYDROCHLORIDE 2 MG: 1 INJECTION, SOLUTION INTRAMUSCULAR; INTRAVENOUS at 08:03

## 2019-03-27 RX ADMIN — PHENYLEPHRINE HYDROCHLORIDE 100 MCG: 10 INJECTION INTRAVENOUS at 09:03

## 2019-03-27 RX ADMIN — PHENYLEPHRINE HYDROCHLORIDE 200 MCG: 10 INJECTION INTRAVENOUS at 08:03

## 2019-03-27 RX ADMIN — LIDOCAINE HYDROCHLORIDE 100 MG: 20 INJECTION, SOLUTION INTRAVENOUS at 08:03

## 2019-03-27 RX ADMIN — SODIUM CHLORIDE: 0.9 INJECTION, SOLUTION INTRAVENOUS at 08:03

## 2019-03-27 RX ADMIN — PHENYLEPHRINE HYDROCHLORIDE 100 MCG: 10 INJECTION INTRAVENOUS at 08:03

## 2019-03-27 RX ADMIN — PROPOFOL 50 MG: 10 INJECTION, EMULSION INTRAVENOUS at 08:03

## 2019-03-27 RX ADMIN — SODIUM CHLORIDE 500 ML: 0.9 INJECTION, SOLUTION INTRAVENOUS at 07:03

## 2019-03-27 RX ADMIN — CEFAZOLIN 1 G: 330 INJECTION, POWDER, FOR SOLUTION INTRAMUSCULAR; INTRAVENOUS at 08:03

## 2019-03-27 RX ADMIN — PHENYLEPHRINE HYDROCHLORIDE 200 MCG: 10 INJECTION INTRAVENOUS at 09:03

## 2019-03-27 RX ADMIN — ONDANSETRON 4 MG: 2 INJECTION INTRAMUSCULAR; INTRAVENOUS at 09:03

## 2019-03-27 RX ADMIN — IOHEXOL 60 ML: 300 INJECTION, SOLUTION INTRAVENOUS at 10:03

## 2019-03-27 RX ADMIN — PROPOFOL 200 MG: 10 INJECTION, EMULSION INTRAVENOUS at 08:03

## 2019-03-27 RX ADMIN — CEFAZOLIN 1 G: 330 INJECTION, POWDER, FOR SOLUTION INTRAMUSCULAR; INTRAVENOUS at 09:03

## 2019-03-27 RX ADMIN — SUCCINYLCHOLINE CHLORIDE 180 MG: 20 INJECTION, SOLUTION INTRAMUSCULAR; INTRAVENOUS at 08:03

## 2019-03-27 RX ADMIN — ROCURONIUM BROMIDE 5 MG: 10 INJECTION, SOLUTION INTRAVENOUS at 08:03

## 2019-03-27 RX ADMIN — SUGAMMADEX 200 MG: 100 INJECTION, SOLUTION INTRAVENOUS at 09:03

## 2019-03-27 RX ADMIN — ROCURONIUM BROMIDE 20 MG: 10 INJECTION, SOLUTION INTRAVENOUS at 09:03

## 2019-03-27 RX ADMIN — PROPOFOL 50 MG: 10 INJECTION, EMULSION INTRAVENOUS at 09:03

## 2019-03-27 RX ADMIN — FENTANYL CITRATE 50 MCG: 50 INJECTION, SOLUTION INTRAMUSCULAR; INTRAVENOUS at 08:03

## 2019-03-27 RX ADMIN — EPHEDRINE SULFATE 5 MG: 50 INJECTION, SOLUTION INTRAMUSCULAR; INTRAVENOUS; SUBCUTANEOUS at 08:03

## 2019-03-27 RX ADMIN — ROCURONIUM BROMIDE 25 MG: 10 INJECTION, SOLUTION INTRAVENOUS at 08:03

## 2019-03-27 RX ADMIN — EPHEDRINE SULFATE 5 MG: 50 INJECTION, SOLUTION INTRAMUSCULAR; INTRAVENOUS; SUBCUTANEOUS at 09:03

## 2019-03-27 NOTE — ANESTHESIA PREPROCEDURE EVALUATION
2019  Cydney Salazar is a 59 y.o., female with sarcoidosis and with pulmonary AVMs s/p embolization x 3, here for embolization.    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    Cataract     Glaucoma     Hypertension     Sarcoidosis     in remission    Sleep apnea     uses cpap       Past Surgical History:   Procedure Laterality Date     SECTION      Embolization N/A 2018    Performed by [facility] at [facility]    EMBOLIZATION N/A 2017    Performed by [facility] at [facility]    EMBOLIZATION N/A 2017    Performed by [facility] at [facility]    ir embolization of pulmonary AVM           Anesthesia Evaluation    I have reviewed the Patient Summary Reports.      I have reviewed the Medications.     Review of Systems  Anesthesia Hx:  History of prior surgery of interest to airway management or planning: Denies Family Hx of Anesthesia complications.  Personal Hx of Anesthesia complications  Difficult Intubation (Glidescope required in past)   Cardiovascular:   Exercise tolerance: good Hypertension Denies MI.    Denies Angina.     ECG has been reviewed. Echo reviewed   Pulmonary:   Sleep Apnea, CPAP Has had sinus drainage recently with coughing, is improving, no wheezing   Renal/:  Renal/ Normal     Hepatic/GI:  Hepatic/GI Normal    Neurological:  Neurology Normal        Physical Exam  General:  Well nourished    Airway/Jaw/Neck:  Airway Findings: Mouth Opening: Normal Tongue: Normal  General Airway Assessment: Pediatric      Dental:  Dental Findings: In tact   Chest/Lungs:  Chest/Lungs Findings: Normal Respiratory Rate, Clear to auscultation     Heart/Vascular:  Heart Findings: Rate: Normal  Rhythm: Regular Rhythm        Mental Status:  Mental Status Findings:  Normally Active child         Anesthesia  Plan  Type of Anesthesia, risks & benefits discussed:  Anesthesia Type:  general  Patient's Preference:   Intra-op Monitoring Plan: standard ASA monitors  Intra-op Monitoring Plan Comments:   Post Op Pain Control Plan: multimodal analgesia, IV/PO Opioids PRN and per primary service following discharge from PACU  Post Op Pain Control Plan Comments:   Induction:   IV  Beta Blocker:  Patient is not currently on a Beta-Blocker (No further documentation required).       Informed Consent: Patient understands risks and agrees with Anesthesia plan.  Questions answered. Anesthesia consent signed with patient.  ASA Score: 3     Day of Surgery Review of History & Physical:    H&P update referred to the surgeon.         Ready For Surgery From Anesthesia Perspective.

## 2019-03-27 NOTE — DISCHARGE SUMMARY
Radiology Discharge Summary      Hospital Course: No complications    Admit Date: 3/27/2019  Discharge Date: 03/27/2019     Instructions Given to Patient: Yes  Diet: Resume prior diet  Activity: activity as tolerated and no driving for today    Description of Condition on Discharge: Stable  Vital Signs (Most Recent): Temp: 98 °F (36.7 °C) (03/27/19 0721)  Pulse: 81 (03/27/19 0721)  Resp: 16 (03/27/19 0721)  BP: (!) 160/75 (03/27/19 0721)  SpO2: 99 % (03/27/19 0721)    Discharge Disposition: Home    Discharge Diagnosis: PAVM s/p angiogram. Follow up as scheduled.    Geovanny Barragan M.D.  Diagnostic and Interventional Radiologist  Department of Radiology  Pager: [phone]

## 2019-03-27 NOTE — PROCEDURES
Radiology Post-Procedure Note    Pre Op Diagnosis: PAVM    Post Op Diagnosis: Same    Procedure: Pulmonary angiogram    Procedure performed by: Geovanny Barragan MD    Written Informed Consent Obtained: Yes  Specimen Removed: NO  Estimated Blood Loss: Minimal    Findings:   Via rt cfv, mpa, and rpa angiograms were obtained. No residual AVM. Hemostasis with manual compression. No complications.    Patient tolerated procedure well.    Geovanny Barragan M.D.  Diagnostic and Interventional Radiologist  Department of Radiology  Pager: [phone]

## 2019-03-27 NOTE — H&P
Radiology History & Physical      SUBJECTIVE:     Chief Complaint: Pulmonary AVM    History of Present Illness:  Cydney Salazar is a 59 y.o. female who presents for image-guided ablation of AVM.    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    Cataract     Glaucoma     Hypertension     Sarcoidosis     in remission    Sleep apnea     uses cpap     Past Surgical History:   Procedure Laterality Date     SECTION      Embolization N/A 2018    Performed by [facility] at [facility]    EMBOLIZATION N/A 2017    Performed by [facility] at [facility]    EMBOLIZATION N/A 2017    [facility]    ir embolization of pulmonary AVM         Home Meds:   Prior to Admission medications    Medication Sig Start Date End Date Taking? Authorizing Provider   atorvastatin (LIPITOR) 40 MG tablet take 1 tablet by mouth every evening 18  Yes Johanny Cheng MD   brimonidine-timolol (COMBIGAN) 0.2-0.5 % Drop Place 1 drop into both eyes 2 (two) times daily. 16  Yes Darrius Romero, ALISSA   losartan-hydrochlorothiazide 100-25 mg (HYZAAR) 100-25 mg per tablet Take 1 tablet by mouth once daily. 18 Yes Johanny Cheng MD   NIFEdipine (PROCARDIA-XL) 30 MG (OSM) 24 hr tablet Take 1 tablet (30 mg total) by mouth once daily. 19 Yes Johanny Cheng MD   albuterol (PROAIR HFA) 90 mcg/actuation HFAA Inhale into the lungs. 2 Aerosol Inhalation Every 3-4 hours    Historical Provider, MD   fluticasone (FLONASE) 50 mcg/actuation nasal spray 1-2 sprays @ nostril once a day 14   Gaurav Randhawa III, MD     Anticoagulants/Antiplatelets: no anticoagulation    Allergies: Review of patient's allergies indicates:  No Known Allergies  Sedation History:  no adverse reactions    Review of Systems:   Hematological: no known coagulopathies  Respiratory: no shortness of  breath  Cardiovascular: no chest pain  Gastrointestinal: no abdominal pain  Genito-Urinary: no dysuria  Musculoskeletal: negative  Neurological: no TIA or stroke symptoms         OBJECTIVE:     Vital Signs (Most Recent)  Temp: 98 °F (36.7 °C) (03/27/19 0721)  Pulse: 81 (03/27/19 0721)  Resp: 16 (03/27/19 0721)  BP: (!) 160/75 (03/27/19 0721)  SpO2: 99 % (03/27/19 0721)    Physical Exam:  ASA: 2  Mallampati: 2    General: no acute distress  Mental Status: alert and oriented to person, place and time  HEENT: normocephalic, atraumatic  Chest: unlabored breathing  Abdomen: nondistended  Extremity: moves all extremities    Laboratory  Lab Results   Component Value Date    INR 0.9 03/27/2019       Lab Results   Component Value Date    WBC 5.63 03/27/2019    HGB 11.3 (L) 03/27/2019    HCT 34.9 (L) 03/27/2019    MCV 88 03/27/2019     03/27/2019      Lab Results   Component Value Date     11/29/2018     (H) 11/29/2018    K 4.6 11/29/2018     11/29/2018    CO2 31 (H) 11/29/2018    BUN 20 11/29/2018    CREATININE 1.0 11/29/2018    CALCIUM 10.0 11/29/2018    ALT 18 11/29/2018    AST 16 11/29/2018    ALBUMIN 4.0 11/29/2018    BILITOT 0.3 11/29/2018       ASSESSMENT/PLAN:     Sedation Plan: Per anesthesia  Patient will undergo image-guided ablation of pulmonary AVM.    Vince Mena M.D.  PGY-3 Radiology

## 2019-03-27 NOTE — TRANSFER OF CARE
"Anesthesia Transfer of Care Note    Patient: Cydney Salazar    Procedure(s) Performed: Procedure(s) (LRB):  EMBOLIZATION, BLOOD VESSEL-AVM (N/A)    Patient location: PACU    Anesthesia Type: general    Transport from OR: Transported from OR on 6-10 L/min O2 by face mask with adequate spontaneous ventilation    Post pain: adequate analgesia    Post assessment: no apparent anesthetic complications and tolerated procedure well    Post vital signs: stable    Level of consciousness: awake    Nausea/Vomiting: no nausea/vomiting    Complications: none    Transfer of care protocol was followed      Last vitals:   Visit Vitals  BP (!) 160/75 (BP Location: Right arm, Patient Position: Lying)   Pulse 81   Temp 36.7 °C (98 °F) (Oral)   Resp 16   Ht 5' 5.5" (1.664 m)   Wt 99.8 kg (220 lb)   SpO2 99%   Breastfeeding? No   BMI 36.05 kg/m²     "

## 2019-03-27 NOTE — DISCHARGE INSTRUCTIONS
·     When to call your healthcare provider  Call your provider right away if you have any of the following:  · Fever of 100.4°F (38°C)  · Signs of infection at the incision site (redness, swelling, or warmth)  · Drainage from your incision  · Changes in color, temperature, feeling, or movement in either foot or leg  · Constant or increasing pain or numbness in your leg  · Leg swelling that does not improve overnight  · Bleeding, bruising, or a large swelling where the catheter was inserted  · Blood in your urine  · Black or tarry stools  · Dizziness or shortness of breath           Recovery After Procedural Sedation (Adult)  You have been given medicine by vein to make you sleep during your surgery. This may have included both a pain medicine and sleeping medicine. Most of the effects have worn off. But you may still have some drowsiness for the next 6 to 8 hours.  Home care  Follow these guidelines when you get home:  · For the next 8 hours, you should be watched by a responsible adult. This person should make sure your condition is not getting worse.  · Don't drink any alcohol for the next 24 hours.  · Don't drive, operate dangerous machinery, or make important business or personal decisions during the next 24 hours.  Note: Your healthcare provider may tell you not to take any medicine by mouth for pain or sleep in the next 4 hours. These medicines may react with the medicines you were given in the hospital. This could cause a much stronger response than usual.  Follow-up care  Follow up with your healthcare provider if you are not alert and back to your usual level of activity within 12 hours.  When to seek medical advice  Call your healthcare provider right away if any of these occur:  · Drowsiness gets worse  · Weakness or dizziness gets worse  · Repeated vomiting  · You can't be awakened   Date Last Reviewed: 10/18/2016  © 6855-8547 [employer]. [address] [place] [address], [place] [address]. All  rights reserved. This information is not intended as a substitute for professional medical care. Always follow your healthcare professional's instructions.

## 2019-03-27 NOTE — PROGRESS NOTES
Pt image-guided ablation of AVM. Procedure complete. Pt to PACU via stretcher with PRATIBHA Hunter to [facility] [place].

## 2019-03-27 NOTE — PROGRESS NOTES
Pt arrived  for image-guided ablation of AVM. No acute distress noted. Labs, orders and consents reviewed. MD advised.

## 2019-03-27 NOTE — PLAN OF CARE
Patient tolerating oral liquids without difficulty. No apparent s&s of distress noted at this time, no complaints voiced at this time. Discharge instructions reviewed with patient/family/friend with good verbal feedback received. Patient ready for discharge. Reviewed education about groin puncture and s/s hematoma.

## 2019-03-28 NOTE — ANESTHESIA POSTPROCEDURE EVALUATION
Anesthesia Post Evaluation    Patient: Cydney Salazar    Procedure(s) Performed: Procedure(s) (LRB):  EMBOLIZATION, BLOOD VESSEL-AVM (N/A)    Final Anesthesia Type: general  Patient location during evaluation: PACU  Patient participation: Yes- Able to Participate  Level of consciousness: awake and alert  Post-procedure vital signs: reviewed and stable  Pain management: adequate  Airway patency: patent  PONV status at discharge: No PONV  Anesthetic complications: no      Cardiovascular status: blood pressure returned to baseline  Respiratory status: unassisted, room air and spontaneous ventilation  Hydration status: euvolemic  Follow-up not needed.          Vitals Value Taken Time   /83 3/27/2019  1:30 PM   Temp 36.7 °C (98 °F) 3/27/2019  1:06 PM   Pulse 82 3/27/2019  1:30 PM   Resp 16 3/27/2019  1:30 PM   SpO2 96 % 3/27/2019  1:30 PM         Event Time     Out of Recovery 13:08:53          Pain/Blake Score: No data recorded

## 2019-04-03 ENCOUNTER — TELEPHONE (OUTPATIENT)
Dept: OPHTHALMOLOGY | Facility: CLINIC | Age: 59
End: 2019-04-03

## 2019-04-08 ENCOUNTER — TELEPHONE (OUTPATIENT)
Dept: PULMONOLOGY | Facility: CLINIC | Age: 59
End: 2019-04-08

## 2019-04-08 DIAGNOSIS — D86.9 SARCOIDOSIS: Primary | ICD-10-CM

## 2019-04-08 DIAGNOSIS — Q27.30 ARTERIOVENOUS MALFORMATION (AVM): ICD-10-CM

## 2019-04-08 DIAGNOSIS — D86.9 SARCOID: Primary | ICD-10-CM

## 2019-04-08 DIAGNOSIS — R06.00 DYSPNEA, UNSPECIFIED TYPE: Primary | ICD-10-CM

## 2019-04-08 NOTE — TELEPHONE ENCOUNTER
Spoke with patient and informed her that she will need to have her ECHO study completed prior to seeing Dr. Owen on 04/16. Patient verbalize that she understands & also informed me that she will also need to have a 6 minute walk completed as well, and if we can get all of her studies scheduled on the same day prior to seeing Dr. Owen that will be fine.

## 2019-04-08 NOTE — TELEPHONE ENCOUNTER
----- Message from Lizett Coe sent at 4/8/2019  9:53 AM CDT -----  Contact:    Patient  [phone]  Patient  Returning the nurse phone call,   Give the pt a call back

## 2019-04-16 ENCOUNTER — HOSPITAL ENCOUNTER (OUTPATIENT)
Dept: PULMONOLOGY | Facility: CLINIC | Age: 59
Discharge: HOME | End: 2019-04-16
Payer: COMMERCIAL

## 2019-04-16 ENCOUNTER — OFFICE VISIT (OUTPATIENT)
Dept: PULMONOLOGY | Facility: CLINIC | Age: 59
End: 2019-04-16
Payer: COMMERCIAL

## 2019-04-16 VITALS
HEIGHT: 65 IN | BODY MASS INDEX: 35.99 KG/M2 | WEIGHT: 216 LBS | OXYGEN SATURATION: 95 % | WEIGHT: 216.06 LBS | HEART RATE: 86 BPM | BODY MASS INDEX: 36 KG/M2 | DIASTOLIC BLOOD PRESSURE: 80 MMHG | SYSTOLIC BLOOD PRESSURE: 132 MMHG | HEIGHT: 65 IN

## 2019-04-16 DIAGNOSIS — G47.33 OSA ON CPAP: ICD-10-CM

## 2019-04-16 DIAGNOSIS — J01.01 ACUTE RECURRENT MAXILLARY SINUSITIS: ICD-10-CM

## 2019-04-16 DIAGNOSIS — D86.0 PULMONARY SARCOIDOSIS: ICD-10-CM

## 2019-04-16 DIAGNOSIS — Q27.30 ARTERIOVENOUS MALFORMATION (AVM): ICD-10-CM

## 2019-04-16 DIAGNOSIS — D86.9 SARCOIDOSIS: ICD-10-CM

## 2019-04-16 DIAGNOSIS — D86.9 SARCOID: ICD-10-CM

## 2019-04-16 DIAGNOSIS — D86.9 SARCOID: Primary | ICD-10-CM

## 2019-04-16 LAB
POST FEV1 FVC: 0.75
POST FEV1: 1.52
POST FVC: 2.04
PRE FEV1 FVC: 73
PRE FEV1: 1.41
PRE FVC: 1.93
PREDICTED FEV1 FVC: 80
PREDICTED FEV1: 2.56
PREDICTED FVC: 3.18

## 2019-04-16 PROCEDURE — 94729 PR C02/MEMBANE DIFFUSE CAPACITY: ICD-10-PCS | Mod: S$GLB,,, | Performed by: INTERNAL MEDICINE

## 2019-04-16 PROCEDURE — 94729 DIFFUSING CAPACITY: CPT | Mod: S$GLB,,, | Performed by: INTERNAL MEDICINE

## 2019-04-16 PROCEDURE — 94727 GAS DIL/WSHOT DETER LNG VOL: CPT | Mod: S$GLB,,, | Performed by: INTERNAL MEDICINE

## 2019-04-16 PROCEDURE — 94060 PR EVAL OF BRONCHOSPASM: ICD-10-PCS | Mod: S$GLB,,, | Performed by: INTERNAL MEDICINE

## 2019-04-16 PROCEDURE — 99999 PR PBB SHADOW E&M-EST. PATIENT-LVL III: CPT | Mod: PBBFAC,,, | Performed by: EMERGENCY MEDICINE

## 2019-04-16 PROCEDURE — 94060 EVALUATION OF WHEEZING: CPT | Mod: S$GLB,,, | Performed by: INTERNAL MEDICINE

## 2019-04-16 PROCEDURE — 94727 PR PULM FUNCTION TEST BY GAS: ICD-10-PCS | Mod: S$GLB,,, | Performed by: INTERNAL MEDICINE

## 2019-04-16 PROCEDURE — 99214 OFFICE O/P EST MOD 30 MIN: CPT | Mod: S$GLB,,, | Performed by: EMERGENCY MEDICINE

## 2019-04-16 PROCEDURE — 99999 PR PBB SHADOW E&M-EST. PATIENT-LVL III: ICD-10-PCS | Mod: PBBFAC,,, | Performed by: EMERGENCY MEDICINE

## 2019-04-16 PROCEDURE — 99214 PR OFFICE/OUTPT VISIT, EST, LEVL IV, 30-39 MIN: ICD-10-PCS | Mod: S$GLB,,, | Performed by: EMERGENCY MEDICINE

## 2019-04-16 PROCEDURE — 94618 PULMONARY STRESS TESTING: CPT | Mod: S$GLB,,, | Performed by: INTERNAL MEDICINE

## 2019-04-16 PROCEDURE — 94618 PULMONARY STRESS TESTING: ICD-10-PCS | Mod: S$GLB,,, | Performed by: INTERNAL MEDICINE

## 2019-04-16 RX ORDER — AMOXICILLIN AND CLAVULANATE POTASSIUM 875; 125 MG/1; MG/1
1 TABLET, FILM COATED ORAL EVERY 12 HOURS
Qty: 14 TABLET | Refills: 0 | Status: SHIPPED | OUTPATIENT
Start: 2019-04-16 | End: 2019-04-23

## 2019-04-16 NOTE — PROCEDURES
Cydney Salazar is a 59 y.o.  female patient, who presents for a 6 minute walk test ordered by MD Brayden.  The diagnosis is Sarcoidosis.  The patient's BMI is 36 kg/m2.  Predicted distance (lower limit of normal) is 321.05 meters.      Test Results:    The test was completed without stopping.   The total time walked was 360 seconds.  During walking, the patient reported:  No complaints. The patient used no assistive devices  during testing.     04/16/2019---------Distance: 405.99 meters (1332 feet)     O2 Sat % Supplemental Oxygen Heart Rate Blood Pressure Patrick Scale   Pre-exercise  (Resting) 98 % Room Air 85 bpm 135/68 mmHg 0   During Exercise 90 % Room Air 110 bpm 175/80 mmHg 0.5   Post-exercise  (Recovery) 96 % Room Air  86 bpm 163/71 mmHg       Recovery Time: 160 seconds    Performing nurse/tech: Kindra HUFF      PREVIOUS STUDY:   The patient had a previous study.  01/16/2019---------Distance: 466.95 meters (1532 feet)       O2 Sat % Supplemental Oxygen Heart Rate Blood Pressure Patrick Scale   Pre-exercise  (Resting) 96 % Room Air 76 bpm 159/83 mmHg 0   During Exercise 89 % Room Air 125 bpm 195/87 mmHg 4   Post-exercise  (Recovery) 98 % Room Air  84 bpm 174/80 mmHg            CLINICAL INTERPRETATION:  Six minute walk distance is 405.99 meters (1332 feet) with very, very light dyspnea.  During exercise, there was significant desaturation while breathing room air.  Both blood pressure and heart rate increased significantly with walking.  This may represent a hypertensive and a tachycardic response to exercise.  The patient did not report non-pulmonary symptoms during exercise.  Since the previous study in January 2019, exercise capacity is unchanged.  Based upon age and body mass index, exercise capacity is normal.

## 2019-04-16 NOTE — PROGRESS NOTES
Pulmonary & Critical Care Medicine   Clinic Note     HPI:  This is a 59-year-old female with a history of hypertension, KAREY, and sarcoidosis.  She has been followed by Dr. Aguilera for some time.  New to me.  Last clinic visit May 2018..  Per Dr. Aguilera:       Ms. Salazar is a 58-year-old nonsmoker, who returns   for an interval assessment of sarcoidosis.  Her most recent previous visit here   was in June 2016.  At that time, she had been having recurrent episodes of   hemoptysis.  She had a CT angiogram, which showed an AV malformation within the   right middle lobe.  This was subsequently treated by embolization on two   occasions.  She has not had any further episodes of hemoptysis.     Martin feels that her respiratory status has been stable.  She is not currently   using any bronchodilator medications.  She has not been on any systemic   treatment for sarcoidosis during the past several years.  She is not having any   symptoms to suggest pulmonary or extrapulmonary disease activity related to   Sarcoidosis. Had more SOB at prior visit.         Interval History:. Since prior visit, she is doing well. SOB is improved and feels good overall. Did develop some sinusitis and seen by PCP. Associated with some mild epistaxis. PCP managed conservatively with H1 antagonist and fluticasone and her symptoms have improved. Still with mild facial pain but also better. No F/C/NS. Weight is stable. PFTs completed prior to clinic.     Had repeat angiogram with IR and no residual AVM identified.     CT brain completed.     Remains compliant with CPAP        Social:   Works at [employer] x 35 years   Daughter just got  and she is excited about this.   -  is a      Pulmonary History:   Sarcoid diagnosed about 20 years prior. Initial diagnosis at [facility] Prednisone for about 11 years. No additional therapy.. Off IS for some time (2 years).   Followed by Dr. Aguilera x 15 years.   No vision problems, constitutional  symptoms.              Past Medical History:   Diagnosis Date    Arteriovenous malformation (AVM) 2016     right middle lobe    Cataract      Glaucoma      Hypertension      Sarcoidosis       in remission    Sleep apnea       uses cpap            Past Surgical History:   Procedure Laterality Date     SECTION        Embolization N/A 2018     [facility]formed [facility]    EMBOLIZATION N/A 2017     Performed by [facility] at [facility]    EMBOLIZATION N/A 2017     Performed by [facility]geon at [facility]    ir embolization of pulmonary AVM          Social History-   , works for [employer].   Daughter (getting  in April)   No occupational or environmental exposures.      Drug Allergies:   No Known Allergies     Review of Systems:      Constitutional: Negative for fever, chills, diaphoresis, appetite change and fatigue. Decreased activity (wants to resume exercise)   HENT: Negative for drooling, facial swelling, hearing loss, tinnitus, trouble swallowing and voice change. Rhinitis/sinusitis per above     Eyes: Negative for photophobia, pain and visual disturbance.   Respiratory: Negative for cough, chest tightness. Positive per HPI. No hemoptysis.   Cardiovascular: Negative for chest pain, palpitations. No edema since Norvasc d/rashel   Gastrointestinal: Negative for nausea, vomiting, abdominal pain, diarrhea and abdominal distention.   Endocrine: Negative for cold intolerance, heat intolerance, polydipsia and polyuria.   Genitourinary: Negative for dysuria, frequency and hematuria.   Musculoskeletal: Negative for myalgias, back pain, arthralgias, neck pain and neck stiffness.   Skin: Negative for color change, pallor, rash and wound.   Allergic/Immunologic: Negative for immunocompromised state.   Neurological: Negative for dizziness, weakness and headaches.    A comprehensive 12-point review of systems was performed, and is negative except for those items mentioned  "above in the HPI section of this note.      Vital Signs:    /80 (BP Location: Right arm, Patient Position: Sitting)   Pulse 86   Ht 5' 5" (1.651 m)   Wt 98 kg (216 lb)   SpO2 95%   BMI 35.94 kg/m²       Physical Exam:   GEN- NAD AAOx3 Well Built, Well Appearing   HEENT- ATNC, PERRLA, EOMI, OP-Cl. No JVD, LAD or bruit noted. Trachea Midline.   CV- RRR No M/R/G  RESP- CTA-Bilateral   GI- S/NT/ND. Positive BS X 4. No HSM Noted  BACK- Spine midline. No step off, crepitus or deformity noted. No midline TTP.   Ext- MAEW, No deformity. No rashes noted. Trace edema bilateral LE.    Neuro- Strength 5/5 symmetric. CN 2-12 intact. Normal gait. Normal sensation.       Personal Review and Summary of Prior Diagnostics     Laboratory Studies:   Reviewed         Microbiology Data: None     Summary of Chest Imaging Personally Reviewed:  Scattered calcified and non-calcified pulmonary nodules, which are stable. JANE scaring. No significant mediastinal adenopathy. RML embolization coils. No significant radiographic progression of sarcoid.      Interval partial embolization of right middle lobe pulmonary AVM which displays continued enhancement and draining veins.  Of note, there may be new recruitment of the right superior phrenic and right bronchial arteries        2D Echo:    · Normal left ventricular systolic function. The estimated ejection fraction is 60%  · Indeterminate left ventricular diastolic function.  · Mild tricuspid regurgitation. The estimated PA systolic pressure is 60 mm Hg  · Mild mitral regurgitation.  · Normal central venous pressure (3 mm Hg).  · Normal right ventricular systolic function.  · No shunting noted with agitated saline     PFT's:      FEV1/FVC- 78  FEV1- 1.66L (64)   FVC- 2.14L (67)  TLC- 64   DLCO- 77      FEV1/FVC- 76  FEV1- 1.43L (56)   FVC- 1.88L (60)  DLCO- 71     PFT today-     FEV1/FVC- 73  FEV1- 1.41L (55)  FVC- 1.93L (61)  TLC- 64  DLCO- 85      2/1/19- Angiography-- There is no " evidence for residual AVM    CT Brain- Brain appears within normal limits.  No CT evidence of arteriovenous malformation.    6MWT- low 90%  Distance- 406M   No need for supplemental O2     Assessment:       1. Sarcoid    2. Pulmonary sarcoidosis    3. KAREY on CPAP    4. Arteriovenous malformation (AVM)    5. Acute recurrent maxillary sinusitis        Outpatient Encounter Medications as of 4/16/2019   Medication Sig Dispense Refill    albuterol (PROAIR HFA) 90 mcg/actuation HFAA Inhale into the lungs. 2 Aerosol Inhalation Every 3-4 hours      atorvastatin (LIPITOR) 40 MG tablet take 1 tablet by mouth every evening 90 tablet 3    brimonidine-timolol (COMBIGAN) 0.2-0.5 % Drop Place 1 drop into both eyes 2 (two) times daily. 5 mL 5    fluticasone (FLONASE) 50 mcg/actuation nasal spray 1-2 sprays @ nostril once a day 1 Bottle 1    losartan-hydrochlorothiazide 100-25 mg (HYZAAR) 100-25 mg per tablet Take 1 tablet by mouth once daily. 90 tablet 3    NIFEdipine (PROCARDIA-XL) 30 MG (OSM) 24 hr tablet Take 1 tablet (30 mg total) by mouth once daily. 30 tablet 11    amoxicillin-clavulanate 875-125mg (AUGMENTIN) 875-125 mg per tablet Take 1 tablet by mouth every 12 (twelve) hours. for 7 days 14 tablet 0     No facility-administered encounter medications on file as of 4/16/2019.      Orders Placed This Encounter   Procedures    CT Chest Without Contrast     Standing Status:   Future     Standing Expiration Date:   4/16/2020     Order Specific Question:   May the Radiologist modify the order per protocol to meet the clinical needs of the patient?     Answer:   Yes    Complete PFT w/ bronchodilator     Standing Status:   Future     Standing Expiration Date:   4/16/2020       Plan:          1. Sarcoidosis- Radiographs reviewed. No progression of parenchymal changes. PFTs with slight reduction, but DLCO is actually higher (86%). Stable from a respiratory standpoint. Still with significant desaturation on 6MWT, but fairly  preserved exercise tolerance and no need for supplemental O2 at this time.. Vaccinations UTD. Overall, her sarcoid seems to be quiescent.. Im going to hold on resuming any additional IS at this point.. Plan to follow up with PFTs and repeat CT in 6 months..       2. Pulmonary AVM- etiology unclear.. Does not meet diagnostic criteria for HHT... Perhaps idiopathic??.. No shunt on bubble study. Brain CT without evidence of cerebral AVMs. Repeat angiogram without residual AVM remaining..       3. KAREY-- Continue CPAP     4. HTN- medical management per PCP      5. Sinusitis- She is clinically improved. Continue nasal corticosteroids and H1 antagonist with saline rinse. Rescue ABX provided and role discussed.      Plan for follow up in 6 months. She has my contact information and can call in the interim if any issues.        Mendel Owen M.D.   [facility]

## 2019-07-22 ENCOUNTER — TELEPHONE (OUTPATIENT)
Dept: OPTOMETRY | Facility: CLINIC | Age: 59
End: 2019-07-22

## 2019-07-22 RX ORDER — BRIMONIDINE TARTRATE, TIMOLOL MALEATE 2; 5 MG/ML; MG/ML
SOLUTION/ DROPS OPHTHALMIC
Qty: 15 ML | Refills: 0 | Status: SHIPPED | OUTPATIENT
Start: 2019-07-22 | End: 2020-06-24 | Stop reason: SDUPTHER

## 2019-08-17 ENCOUNTER — PATIENT MESSAGE (OUTPATIENT)
Dept: INTERNAL MEDICINE | Facility: CLINIC | Age: 59
End: 2019-08-17

## 2019-08-17 ENCOUNTER — OFFICE VISIT (OUTPATIENT)
Dept: URGENT CARE | Facility: CLINIC | Age: 59
End: 2019-08-17
Payer: COMMERCIAL

## 2019-08-17 VITALS
WEIGHT: 220 LBS | HEIGHT: 65 IN | HEART RATE: 87 BPM | SYSTOLIC BLOOD PRESSURE: 174 MMHG | TEMPERATURE: 98 F | BODY MASS INDEX: 36.65 KG/M2 | RESPIRATION RATE: 16 BRPM | OXYGEN SATURATION: 96 % | DIASTOLIC BLOOD PRESSURE: 92 MMHG

## 2019-08-17 DIAGNOSIS — R42 VERTIGO: Primary | ICD-10-CM

## 2019-08-17 DIAGNOSIS — I10 HYPERTENSION, UNSPECIFIED TYPE: ICD-10-CM

## 2019-08-17 PROCEDURE — 99214 OFFICE O/P EST MOD 30 MIN: CPT | Mod: S$GLB,,, | Performed by: NURSE PRACTITIONER

## 2019-08-17 PROCEDURE — 93005 ELECTROCARDIOGRAM TRACING: CPT | Mod: S$GLB,,, | Performed by: NURSE PRACTITIONER

## 2019-08-17 PROCEDURE — 99214 PR OFFICE/OUTPT VISIT, EST, LEVL IV, 30-39 MIN: ICD-10-PCS | Mod: S$GLB,,, | Performed by: NURSE PRACTITIONER

## 2019-08-17 PROCEDURE — 93005 EKG 12-LEAD: ICD-10-PCS | Mod: S$GLB,,, | Performed by: NURSE PRACTITIONER

## 2019-08-17 PROCEDURE — 93010 EKG 12-LEAD: ICD-10-PCS | Mod: S$GLB,,, | Performed by: INTERNAL MEDICINE

## 2019-08-17 PROCEDURE — 93010 ELECTROCARDIOGRAM REPORT: CPT | Mod: S$GLB,,, | Performed by: INTERNAL MEDICINE

## 2019-08-17 RX ORDER — MECLIZINE HYDROCHLORIDE 25 MG/1
25 TABLET ORAL 3 TIMES DAILY PRN
Qty: 20 TABLET | Refills: 0 | Status: SHIPPED | OUTPATIENT
Start: 2019-08-17 | End: 2021-01-27

## 2019-08-17 RX ORDER — MECLIZINE HYDROCHLORIDE 25 MG/1
25 TABLET ORAL
Status: COMPLETED | OUTPATIENT
Start: 2019-08-17 | End: 2019-08-17

## 2019-08-17 RX ADMIN — MECLIZINE HYDROCHLORIDE 25 MG: 25 TABLET ORAL at 05:08

## 2019-08-17 NOTE — PROGRESS NOTES
"Subjective:       Patient ID: Cydney Salazar is a 59 y.o. female.    Vitals:  height is 5' 5" (1.651 m) and weight is 99.8 kg (220 lb). Her temperature is 97.7 °F (36.5 °C). Her blood pressure is 174/92 (abnormal) and her pulse is 87. Her respiration is 16 and oxygen saturation is 96%.     Chief Complaint: Dizziness    Pt states dizziness started about 30 minutes ago, pt laid herself down on the floor with the help of a co worker. Pt states she has been slightly dizzy on and off since her bp meds were changed in December.  Patient denies any chest pain or shortness of breath.  Reports that 1 episode occurred a felt like the whole room was spinning and that is why she has a sit-down.  Patient denies any headaches or vision changes.  Denies any nausea or vomiting.  Patient denies any recent illness.  Patient does have high blood pressure but takes her medications as prescribed.  Patient denies any numbness or tingling associated arms or legs.  Patient reports the symptoms have drastically improving in the clinic today.    Dizziness:   Chronicity:  New  Onset:  Today  Progression since onset:  Waxing and waning  Severity:  Initially severe, but improved  Duration:  1 minute  Dizziness characteristics:  Off-balanceno fever, no headaches, no nausea, no vomiting, no weakness, no light-headedness and no chest pain.  Treatments tried:  Nothing      Constitution: Negative for chills, fatigue and fever.   HENT: Negative for congestion and sore throat.    Neck: Negative for painful lymph nodes.   Cardiovascular: Negative for chest pain and leg swelling.   Eyes: Negative for double vision and blurred vision.   Respiratory: Negative for cough and shortness of breath.    Gastrointestinal: Negative for nausea, vomiting and diarrhea.   Genitourinary: Negative for dysuria, frequency, urgency and history of kidney stones.   Musculoskeletal: Negative for joint pain, joint swelling, muscle cramps and muscle ache.   Skin: Negative " for color change, pale, rash and bruising.   Allergic/Immunologic: Negative for seasonal allergies.   Neurological: Positive for dizziness. Negative for history of vertigo, light-headedness, passing out and headaches.   Hematologic/Lymphatic: Negative for swollen lymph nodes.   Psychiatric/Behavioral: Negative for nervous/anxious, sleep disturbance and depression. The patient is not nervous/anxious.        Objective:      Physical Exam   Constitutional: She is oriented to person, place, and time. She appears well-developed and well-nourished. She is cooperative.  Non-toxic appearance. She does not appear ill. No distress.   HENT:   Head: Normocephalic and atraumatic.   Right Ear: Hearing, tympanic membrane, external ear and ear canal normal.   Left Ear: Hearing, tympanic membrane, external ear and ear canal normal.   Nose: Nose normal. No mucosal edema, rhinorrhea or nasal deformity. No epistaxis. Right sinus exhibits no maxillary sinus tenderness and no frontal sinus tenderness. Left sinus exhibits no maxillary sinus tenderness and no frontal sinus tenderness.   Mouth/Throat: Uvula is midline, oropharynx is clear and moist and mucous membranes are normal. No trismus in the jaw. Normal dentition. No uvula swelling. No posterior oropharyngeal erythema.   Eyes: Pupils are equal, round, and reactive to light. Conjunctivae, EOM and lids are normal. Right eye exhibits no discharge. Left eye exhibits no discharge. No scleral icterus. Right eye exhibits normal extraocular motion and no nystagmus. Left eye exhibits normal extraocular motion and no nystagmus.   Sclera clear bilat   Neck: Trachea normal, normal range of motion, full passive range of motion without pain and phonation normal. Neck supple.   Cardiovascular: Normal rate, regular rhythm, normal heart sounds, intact distal pulses and normal pulses.   Pulmonary/Chest: Effort normal and breath sounds normal. No respiratory distress.   Abdominal: Soft. Normal  appearance and bowel sounds are normal. She exhibits no distension, no pulsatile midline mass and no mass. There is no tenderness.   Musculoskeletal: Normal range of motion. She exhibits no edema or deformity.   Neurological: She is alert and oriented to person, place, and time. She has normal strength and normal reflexes. No cranial nerve deficit or sensory deficit. She exhibits normal muscle tone. She displays a negative Romberg sign. Coordination normal. GCS eye subscore is 4. GCS verbal subscore is 5. GCS motor subscore is 6.   CN II-XII grossly intact.   Gait smooth and steady.   Speech is clear.   Follows all commands.   Rapid, alternating hand movements intact.   Finger to nose intact.   Heel to shin intact.   Face is symmetrical   Hearing intact.   Strength 5/5 to all 4 extremities.   Moves all 4 extremities equally.      Skin: Skin is warm, dry and intact. She is not diaphoretic. No pallor.   Psychiatric: She has a normal mood and affect. Her speech is normal and behavior is normal. Judgment and thought content normal. Cognition and memory are normal.   Nursing note and vitals reviewed.        EKG: normal EKG, normal sinus rhythm, there are no previous tracings available for comparison, normal sinus rhythm. 77 BPM.    Assessment:       1. Vertigo    2. Hypertension, unspecified type        Plan:       Patient is asymptomatic before discharge. Patient is educated on the use of meclizine as needed.  EKG in the clinic today is normal.  Patient is given prescription for meclizine to take at home.  Advised follow-up with her primary care doctor.  Patient is also advised to monitor blood pressure tomorrow.  ER precautions red flag warnings discussed in length with the patient she verbalizes understanding and is in agreement with the treatment plan.    Vertigo  -     meclizine tablet 25 mg  -     Orthostatic vital signs  -     EKG 12-lead  -     meclizine (ANTIVERT) 25 mg tablet; Take 1 tablet (25 mg total) by  mouth 3 (three) times daily as needed for Dizziness.  Dispense: 20 tablet; Refill: 0    Hypertension, unspecified type    -ER precautions.  -Monitor blood pressure at home.  -Follow up with PCP.    Patient Instructions     Taking Your Blood Pressure  Blood pressure is the force of blood against the artery wall as it moves from the heart through the blood vessels. You can take your own blood pressure reading using a digital monitor. Take your readings the same each time, using the same arm. Take readings as often as your healthcare provider instructs.  About blood pressure monitors  Blood pressure monitors are designed for certain ages and cases. You can find monitors for older adults, for pregnant women, and for children. Make sure the one you choose is the right one for your age and situation.  The American Heart Association recommends an automatic cuff monitor that fits on your upper arm (bicep). The cuff should fit your arm size. A cuff thats too large or too small will not give an accurate reading. Measure around your upper arm to find your size.  Monitors that attach to your finger or wrist are not as accurate as monitors for your upper arm.  Ask your healthcare provider for help in choosing a monitor. Bring your monitor to your next provider visit if you need help in using it the correct way.  The steps below are general instructions for using an automatic digital monitor.  Step 1. Relax    · Take your blood pressure at the same time every day, such as in the morning or evening, or at the time your healthcare provider recommends.  · Wait at least a half-hour after smoking, eating, or exercising. Don't drink coffee, tea, soda, or other caffeinated beverages before checking your blood pressure.  · Sit comfortably at a table with both feet on the floor. Do not cross your legs or feet. Place the monitor near you.  · Rest for a few minutes before you begin.  Step 2. Wrap the cuff    · Place your arm on the table,  palm up. Your arm should be at the level of your heart. Wrap the cuff around your upper arm, just above your elbow. Its best done on bare skin, not over clothing. Most cuffs will indicate where the brachial artery (the blood vessel in the middle of the arm at the inner side of the elbow) should line up with the cuff. Look in your monitor's instruction booklet for an illustration. You can also bring your cuff to your healthcare provider and have them show you how to correctly place the cuff.  Step 3. Inflate the cuff    · Push the button that starts the pump.  · The cuff will tighten, then loosen.  · The numbers will change. When they stop changing, your blood pressure reading will appear.  · Take 2 or 3 readings one minute apart.  Step 4. Write down the results of each reading    · Write down your blood pressure numbers for each reading. Note the date and time. Keep your results in one place, such as a notebook. Even if your monitor has a built-in memory, keep a hard copy of the readings.  · Remove the cuff from your arm. Turn off the machine.  · Bring your blood pressure records with your healthcare providers at each visit.  · If you start a new blood pressure medicine, note the day you started the new medicine. Also note the day if you change the dose of your medicine. This information goes on your blood pressure recording sheet. This will help your healthcare provider monitor how well the medicine changes are working.  · Ask your healthcare provider what numbers should prompt you to call him or her. Also ask what numbers should prompt you to get help right away.  Date Last Reviewed: 11/1/2016 © 2000-2017 [employer]. [address] [place] [address], [place] [address]. All rights reserved. This information is not intended as a substitute for professional medical care. Always follow your healthcare professional's instructions.        Dizziness (Uncertain Cause)  Dizziness is a common symptom. It may be  described as lightheadedness, spinning, or feeling like you are going to faint. Dizziness can have many causes.  Be sure to tell the healthcare provider about:  · All medicines you take, including prescription, over-the-counter, herbs, and supplements  · Any other symptoms you have  · Any health problems you are being treated for  · Anything that causes the dizziness to get worse or better  Today's exam did not show an exact cause for your dizziness. Other tests may be needed. Follow up with your healthcare provider.  Home care  · Dizziness that occurs with sudden standing may be a sign of mild dehydration. Drink extra fluids for the next few days.  · If you recently started a new medicine, stopped a medicine, or had the dose of a current medicine changed, talk with the prescribing healthcare provider. Your medicine plan may need adjustment.  · If dizziness lasts more than a few seconds, sit or lie down until it passes. This may help prevent injury in case you pass out.  · Do not drive or use power tools or dangerous equipment until you have had no dizziness for at least 48 hours.  Follow-up care  Follow up with your healthcare provider for further evaluation within the next 7 days or as advised.  When to seek medical advice  Call your healthcare provider for any of the following:  · Worsening of symptoms or new symptoms  · Passing out or seizure  · Repeated vomiting  · Headache  · Palpitations (the sense that your heart is fluttering or beating fast or hard)  · Shortness of breath  · Blood in vomit or stool (black or red color)  · Weakness of an arm or leg or one side of the face  · Vision or hearing changes  · Trouble walking or speaking  · Chest, arm, neck, back, or jaw pain  Date Last Reviewed: 8/23/2015  © 5920-8058 [employer]. [address] [place] [address]. All rights reserved. This information is not intended as a substitute for professional medical care. Always follow your  healthcare professional's instructions.      -EKG today is normal.   -Meclizine given here today.  -Meclizine as needed, only use if symptoms return.  -Follow up with your primary care doctor.  -If symptoms worsen go to the ER.  Please follow up with your Primary care provider within 2-5 days if your signs and symptoms have not resolved or worsen.     If your condition worsens or fails to improve we recommend that you receive another evaluation at the emergency room immediately or contact your primary medical clinic to discuss your concerns.   You must understand that you have received an Urgent Care treatment only and that you may be released before all of your medical problems are known or treated. You, the patient, will arrange for follow up care as instructed.

## 2019-08-17 NOTE — PATIENT INSTRUCTIONS
Taking Your Blood Pressure  Blood pressure is the force of blood against the artery wall as it moves from the heart through the blood vessels. You can take your own blood pressure reading using a digital monitor. Take your readings the same each time, using the same arm. Take readings as often as your healthcare provider instructs.  About blood pressure monitors  Blood pressure monitors are designed for certain ages and cases. You can find monitors for older adults, for pregnant women, and for children. Make sure the one you choose is the right one for your age and situation.  The American Heart Association recommends an automatic cuff monitor that fits on your upper arm (bicep). The cuff should fit your arm size. A cuff thats too large or too small will not give an accurate reading. Measure around your upper arm to find your size.  Monitors that attach to your finger or wrist are not as accurate as monitors for your upper arm.  Ask your healthcare provider for help in choosing a monitor. Bring your monitor to your next provider visit if you need help in using it the correct way.  The steps below are general instructions for using an automatic digital monitor.  Step 1. Relax    · Take your blood pressure at the same time every day, such as in the morning or evening, or at the time your healthcare provider recommends.  · Wait at least a half-hour after smoking, eating, or exercising. Don't drink coffee, tea, soda, or other caffeinated beverages before checking your blood pressure.  · Sit comfortably at a table with both feet on the floor. Do not cross your legs or feet. Place the monitor near you.  · Rest for a few minutes before you begin.  Step 2. Wrap the cuff    · Place your arm on the table, palm up. Your arm should be at the level of your heart. Wrap the cuff around your upper arm, just above your elbow. Its best done on bare skin, not over clothing. Most cuffs will indicate where the brachial artery (the  blood vessel in the middle of the arm at the inner side of the elbow) should line up with the cuff. Look in your monitor's instruction booklet for an illustration. You can also bring your cuff to your healthcare provider and have them show you how to correctly place the cuff.  Step 3. Inflate the cuff    · Push the button that starts the pump.  · The cuff will tighten, then loosen.  · The numbers will change. When they stop changing, your blood pressure reading will appear.  · Take 2 or 3 readings one minute apart.  Step 4. Write down the results of each reading    · Write down your blood pressure numbers for each reading. Note the date and time. Keep your results in one place, such as a notebook. Even if your monitor has a built-in memory, keep a hard copy of the readings.  · Remove the cuff from your arm. Turn off the machine.  · Bring your blood pressure records with your healthcare providers at each visit.  · If you start a new blood pressure medicine, note the day you started the new medicine. Also note the day if you change the dose of your medicine. This information goes on your blood pressure recording sheet. This will help your healthcare provider monitor how well the medicine changes are working.  · Ask your healthcare provider what numbers should prompt you to call him or her. Also ask what numbers should prompt you to get help right away.  Date Last Reviewed: 11/1/2016 © 2000-2017 [employer]. [address] [place] [address]. All rights reserved. This information is not intended as a substitute for professional medical care. Always follow your healthcare professional's instructions.        Dizziness (Uncertain Cause)  Dizziness is a common symptom. It may be described as lightheadedness, spinning, or feeling like you are going to faint. Dizziness can have many causes.  Be sure to tell the healthcare provider about:  · All medicines you take, including prescription,  over-the-counter, herbs, and supplements  · Any other symptoms you have  · Any health problems you are being treated for  · Anything that causes the dizziness to get worse or better  Today's exam did not show an exact cause for your dizziness. Other tests may be needed. Follow up with your healthcare provider.  Home care  · Dizziness that occurs with sudden standing may be a sign of mild dehydration. Drink extra fluids for the next few days.  · If you recently started a new medicine, stopped a medicine, or had the dose of a current medicine changed, talk with the prescribing healthcare provider. Your medicine plan may need adjustment.  · If dizziness lasts more than a few seconds, sit or lie down until it passes. This may help prevent injury in case you pass out.  · Do not drive or use power tools or dangerous equipment until you have had no dizziness for at least 48 hours.  Follow-up care  Follow up with your healthcare provider for further evaluation within the next 7 days or as advised.  When to seek medical advice  Call your healthcare provider for any of the following:  · Worsening of symptoms or new symptoms  · Passing out or seizure  · Repeated vomiting  · Headache  · Palpitations (the sense that your heart is fluttering or beating fast or hard)  · Shortness of breath  · Blood in vomit or stool (black or red color)  · Weakness of an arm or leg or one side of the face  · Vision or hearing changes  · Trouble walking or speaking  · Chest, arm, neck, back, or jaw pain  Date Last Reviewed: 8/23/2015  © 6788-3983 [employer]. [address] [place] [address]. All rights reserved. This information is not intended as a substitute for professional medical care. Always follow your healthcare professional's instructions.      -EKG today is normal.   -Meclizine given here today.  -Meclizine as needed, only use if symptoms return.  -Follow up with your primary care doctor.  -If symptoms worsen go to  the ER.  Please follow up with your Primary care provider within 2-5 days if your signs and symptoms have not resolved or worsen.     If your condition worsens or fails to improve we recommend that you receive another evaluation at the emergency room immediately or contact your primary medical clinic to discuss your concerns.   You must understand that you have received an Urgent Care treatment only and that you may be released before all of your medical problems are known or treated. You, the patient, will arrange for follow up care as instructed.

## 2019-08-19 ENCOUNTER — PATIENT MESSAGE (OUTPATIENT)
Dept: PULMONOLOGY | Facility: CLINIC | Age: 59
End: 2019-08-19

## 2019-08-20 ENCOUNTER — TELEPHONE (OUTPATIENT)
Dept: INTERNAL MEDICINE | Facility: CLINIC | Age: 59
End: 2019-08-20

## 2019-08-20 NOTE — TELEPHONE ENCOUNTER
"How are her blood pressure readings? If vertigo is persistent should come in to be re-evaluated. (can come Thursday morning during "student" spot)  Did they instruct her on Epley maneuver for re-positioning crystals?   "

## 2019-08-20 NOTE — TELEPHONE ENCOUNTER
----- Message from Mery Casey sent at 8/20/2019  8:30 AM CDT -----  Contact: self/ [phone]  Please call patient . Patient would not disclose what it was about.

## 2019-08-20 NOTE — TELEPHONE ENCOUNTER
Spoke to patient. Patient states that she was seen in Urgent Care on 8/17 with Dr Robertson for dizziness (vertigo dx) and elevated B/P. Patient states that she had syncoped at work. Patient says that she is resting at home. Patient says that they ran an EKG and did orthostatic vitals (reading was pretty elevated at 174/92 pulse 87 standing). Patient verbalized that the dizziness has subsided but vertigo does have something to do with crystals in ears, patient asks should she be seen by ENT for further evaluation? Pt was prescribed Rx Meclizine to take orally po TID.    Please advise.

## 2019-08-21 NOTE — TELEPHONE ENCOUNTER
Spoke with pt, she is unsure of BP readings. She says she has not checked it recently. Pt will come liam for 1030

## 2019-08-22 ENCOUNTER — OFFICE VISIT (OUTPATIENT)
Dept: INTERNAL MEDICINE | Facility: CLINIC | Age: 59
End: 2019-08-22
Payer: COMMERCIAL

## 2019-08-22 VITALS
HEIGHT: 65 IN | WEIGHT: 211 LBS | DIASTOLIC BLOOD PRESSURE: 81 MMHG | BODY MASS INDEX: 35.16 KG/M2 | SYSTOLIC BLOOD PRESSURE: 132 MMHG

## 2019-08-22 DIAGNOSIS — R42 LIGHT HEADEDNESS: ICD-10-CM

## 2019-08-22 DIAGNOSIS — H93.A9 PULSATILE TINNITUS: ICD-10-CM

## 2019-08-22 DIAGNOSIS — I10 ESSENTIAL HYPERTENSION: Primary | ICD-10-CM

## 2019-08-22 DIAGNOSIS — R42 VERTIGO: ICD-10-CM

## 2019-08-22 PROCEDURE — 99999 PR PBB SHADOW E&M-EST. PATIENT-LVL IV: ICD-10-PCS | Mod: PBBFAC,,, | Performed by: INTERNAL MEDICINE

## 2019-08-22 PROCEDURE — 99999 PR PBB SHADOW E&M-EST. PATIENT-LVL IV: CPT | Mod: PBBFAC,,, | Performed by: INTERNAL MEDICINE

## 2019-08-22 PROCEDURE — 99214 OFFICE O/P EST MOD 30 MIN: CPT | Mod: S$GLB,,, | Performed by: INTERNAL MEDICINE

## 2019-08-22 PROCEDURE — 99214 PR OFFICE/OUTPT VISIT, EST, LEVL IV, 30-39 MIN: ICD-10-PCS | Mod: S$GLB,,, | Performed by: INTERNAL MEDICINE

## 2019-08-22 NOTE — PROGRESS NOTES
"Subjective:       Patient ID: Cydney Salazar is a 59 y.o. female.    Chief Complaint: Dizziness (patient was seen at [facility] for diagnosis. patient was prescribed Rx Meclizine 25 mg. still persists at this time but not as severe as it was on Sat. ) and Hypertension (patient has not checked since  visit, reading was 174/92 pulse 87. no other unusual sx like lightheadedness, chest pain or SOB. says that she was strained from fall.)    HPI   60 yo F here for eval of dizziness. She was seen at [facility] on 8/17.   Prior last December HTN on losartan-hctz 100-25mg and amlodipine 5mg.   Amlodipine was changed to nifedipine 30mg xl.   She is followed by pulmonary for sarcoidosis.     At  visit her BP was 174/92. Exam unremarkable. rx meclizine.     She experienced sudden onset of dizziness and passed out when tried to sit.   No ear pain. Does feel swooshing sound in left ear.   Review of Systems    Objective:   /81 (BP Location: Right arm, Patient Position: Sitting, BP Method: Medium (Manual))   Ht 5' 5" (1.651 m)   Wt 95.7 kg (211 lb)   BMI 35.11 kg/m²       Sitting 154/79 p 83  Standing 132/64 p 84    Physical Exam   Constitutional: She is oriented to person, place, and time. She appears well-developed and well-nourished. No distress.   HENT:   Head: Normocephalic and atraumatic.   Bilateral ear canals without purulence and bilateral TM with good light reflex     Cardiovascular: Normal rate and regular rhythm.   No carotid bruit appreciated   Pulmonary/Chest: Effort normal. No respiratory distress. She has no wheezes. She has no rales.   Neurological: She is alert and oriented to person, place, and time.   ga hallpike negative   Skin: Skin is warm and dry. She is not diaphoretic.   Psychiatric: She has a normal mood and affect. Her behavior is normal.       Assessment:       1. Essential hypertension    2. Vertigo    3. Light headedness    4. Pulsatile tinnitus        Plan:       Cydney Montes was " seen today for dizziness and hypertension.    Diagnoses and all orders for this visit:    Essential hypertension with orthostatic decrease in BP but remains above goal  Continue current meds for now    Episode of severe vertigo with ongoing Light headedness and intermittent left sided Pulsatile tinnitus  -     US Carotid Bilateral; Future  -     Ambulatory consult to ENT   Refrain from driving until sx resolve   Meclizine prn

## 2019-08-26 ENCOUNTER — CLINICAL SUPPORT (OUTPATIENT)
Dept: AUDIOLOGY | Facility: CLINIC | Age: 59
End: 2019-08-26
Payer: COMMERCIAL

## 2019-08-26 ENCOUNTER — TELEPHONE (OUTPATIENT)
Dept: OTOLARYNGOLOGY | Facility: CLINIC | Age: 59
End: 2019-08-26

## 2019-08-26 ENCOUNTER — OFFICE VISIT (OUTPATIENT)
Dept: OTOLARYNGOLOGY | Facility: CLINIC | Age: 59
End: 2019-08-26
Payer: COMMERCIAL

## 2019-08-26 ENCOUNTER — TELEPHONE (OUTPATIENT)
Dept: INTERNAL MEDICINE | Facility: CLINIC | Age: 59
End: 2019-08-26

## 2019-08-26 VITALS
SYSTOLIC BLOOD PRESSURE: 160 MMHG | DIASTOLIC BLOOD PRESSURE: 88 MMHG | WEIGHT: 209.44 LBS | BODY MASS INDEX: 34.85 KG/M2

## 2019-08-26 DIAGNOSIS — I10 ESSENTIAL HYPERTENSION: Primary | ICD-10-CM

## 2019-08-26 DIAGNOSIS — H90.3 SENSORINEURAL HEARING LOSS, BILATERAL: ICD-10-CM

## 2019-08-26 DIAGNOSIS — H93.19 TINNITUS, UNSPECIFIED LATERALITY: Primary | ICD-10-CM

## 2019-08-26 DIAGNOSIS — H90.3 SENSORINEURAL HEARING LOSS OF BOTH EARS: Primary | ICD-10-CM

## 2019-08-26 DIAGNOSIS — R42 VERTIGO: Primary | ICD-10-CM

## 2019-08-26 PROCEDURE — 99999 PR PBB SHADOW E&M-EST. PATIENT-LVL I: ICD-10-PCS | Mod: PBBFAC,,,

## 2019-08-26 PROCEDURE — 99999 PR PBB SHADOW E&M-EST. PATIENT-LVL I: CPT | Mod: PBBFAC,,,

## 2019-08-26 PROCEDURE — 99999 PR PBB SHADOW E&M-EST. PATIENT-LVL III: ICD-10-PCS | Mod: PBBFAC,,, | Performed by: OTOLARYNGOLOGY

## 2019-08-26 PROCEDURE — 99203 OFFICE O/P NEW LOW 30 MIN: CPT | Mod: S$GLB,,, | Performed by: OTOLARYNGOLOGY

## 2019-08-26 PROCEDURE — 92567 TYMPANOMETRY: CPT | Mod: S$GLB,,, | Performed by: AUDIOLOGIST

## 2019-08-26 PROCEDURE — 92567 PR TYMPA2METRY: ICD-10-PCS | Mod: S$GLB,,, | Performed by: AUDIOLOGIST

## 2019-08-26 PROCEDURE — 99999 PR PBB SHADOW E&M-EST. PATIENT-LVL III: CPT | Mod: PBBFAC,,, | Performed by: OTOLARYNGOLOGY

## 2019-08-26 PROCEDURE — 99203 PR OFFICE/OUTPT VISIT, NEW, LEVL III, 30-44 MIN: ICD-10-PCS | Mod: S$GLB,,, | Performed by: OTOLARYNGOLOGY

## 2019-08-26 PROCEDURE — 92557 COMPREHENSIVE HEARING TEST: CPT | Mod: S$GLB,,, | Performed by: AUDIOLOGIST

## 2019-08-26 PROCEDURE — 92557 PR COMPREHENSIVE HEARING TEST: ICD-10-PCS | Mod: S$GLB,,, | Performed by: AUDIOLOGIST

## 2019-08-26 RX ORDER — HYDROCHLOROTHIAZIDE 25 MG/1
25 TABLET ORAL DAILY
Qty: 90 TABLET | Refills: 3 | Status: SHIPPED | OUTPATIENT
Start: 2019-08-26 | End: 2021-01-27

## 2019-08-26 RX ORDER — LOSARTAN POTASSIUM 100 MG/1
100 TABLET ORAL DAILY
Qty: 90 TABLET | Refills: 3 | Status: SHIPPED | OUTPATIENT
Start: 2019-08-26 | End: 2021-01-27

## 2019-08-26 NOTE — LETTER
August 26, 2019      Johanny Cheng MD  [address]  [facility] [address]           [facility] - Otorhinolaryngology  [address]  [facility] [address]  Phone: [phone]  Fax: [phone]          Patient: Cydney Salazar   MR Number: [id number]   YOB: 1960   Date of Visit: 8/26/2019       Dear Dr. Johanny Cheng:    Thank you for referring Cydney Salazar to me for evaluation. Attached you will find relevant portions of my assessment and plan of care.    If you have questions, please do not hesitate to call me. I look forward to following Cydney Salazar along with you.    Sincerely,    Valente Guillen MD    Enclosure  CC:  No Recipients    If you would like to receive this communication electronically, please contact [email or web address] or [phone] to request more information on [employer] access.    For providers and/or their staff who would like to refer a patient to [facility], please contact us through our one-stop-shop provider referral line, [facility], at [phone].    If you feel you have received this communication in error or would no longer like to receive these types of communications, please e-mail [email or web address]

## 2019-08-26 NOTE — TELEPHONE ENCOUNTER
----- Message from Christine Gonzalez sent at 8/26/2019 11:08 AM CDT -----  [facility]   Pharmacy is calling to clarify an RX.  RX name:  losartan-hydrochlorothiazide 100-25 mg (HYZAAR) 100-25 mg per tablet  What do they need to clarify:  Substitution b/c this form is not available: will be 2 separate Rx  Comments:

## 2019-08-26 NOTE — PROGRESS NOTES
Cydney Salazar was seen in the clinic today for an audiological evaluation.  Ms. Salazar reported that she has been experiencing vertigo for approximately one week.    Audiological testing revealed a mild to moderate sensorineural hearing loss from 9264-2612 Hz for each ear.  A speech reception threshold was obtained at 25 dBHL for the right ear and at 20 dBHL for the left ear.  Speech discrimination was 100% for each ear.      Tympanometry testing revealed a Type A tympanogram for each ear.    Recommendations:  1. Otologic evaluation  2. Annual audiological evaluation  3. Hearing protection when in noise   4. Hearing aid consultation following medical clearance

## 2019-08-26 NOTE — TELEPHONE ENCOUNTER
Spoke to pharmacy. Pharmacy states that two separate prescriptions would need to be sent for the losartan HCTZ medicine. They are requesting substitution since form is not available at this time. Is this okay?     Please advise.

## 2019-08-26 NOTE — PROGRESS NOTES
Subjective:       Patient ID: Cydney Salazar is a 59 y.o. female.    Chief Complaint: Dizziness    HPI     Cydney Salazar is a 59 y.o. female presents for evaluation of dizziness. She reports 1 episode of dizziness occurring 9 days ago.  It occurred while walking.  She reports she suddenly felt dizzy without warning and fell to the ground.  She reports feeling like the environment was spinning.  The sensation lasted seconds.  She denies symptoms relating to her ears at the time including hearing loss, fullness, or tinnitus.  She has not had the sensation since accept once briefly when turning in bed.  She denies migraine HA.  She denies neurological weaknesses.    Review of Systems   Constitutional: Negative for chills, fever and unexpected weight change.   HENT: Negative for sore throat and trouble swallowing.    Eyes: Negative for pain and visual disturbance.   Respiratory: Negative for apnea and shortness of breath.    Cardiovascular: Negative for chest pain and palpitations.   Gastrointestinal: Negative for abdominal pain and nausea.   Endocrine: Negative for cold intolerance and heat intolerance.   Musculoskeletal: Negative for joint swelling and neck stiffness.   Skin: Negative for color change and rash.   Neurological: Negative for facial asymmetry and headaches.   Hematological: Negative for adenopathy. Does not bruise/bleed easily.   Psychiatric/Behavioral: Negative for agitation. The patient is not nervous/anxious.        Objective:      Physical Exam   Constitutional: She is oriented to person, place, and time. She appears well-developed and well-nourished. No distress.   HENT:   Head: Normocephalic and atraumatic.   Right Ear: Tympanic membrane, external ear and ear canal normal.   Left Ear: Tympanic membrane, external ear and ear canal normal.   Nose: Nose normal.   Mouth/Throat: Uvula is midline, oropharynx is clear and moist and mucous membranes are normal.   Prince: Midline  Rinne: AC > BC @  512Hz   Eyes: Pupils are equal, round, and reactive to light. Conjunctivae and EOM are normal.   Neck: Normal range of motion. No tracheal deviation present. No thyromegaly present.   Cardiovascular: Normal rate and regular rhythm.   Pulmonary/Chest: Effort normal. No respiratory distress.   Musculoskeletal: Normal range of motion.   Lymphadenopathy:        Head (right side): No submental, no submandibular and no tonsillar adenopathy present.        Head (left side): No submental, no submandibular and no tonsillar adenopathy present.     She has no cervical adenopathy.   Neurological: She is alert and oriented to person, place, and time.   Psychiatric: She has a normal mood and affect. Her behavior is normal.   Nursing note and vitals reviewed.      Sharan Borrego pike: negative bilaterally    Audiogram:      Audiogram tracings independently reviewed and discussed with patient shows mild SNHL bilaterally  Assessment:       1. Vertigo    2. Sensorineural hearing loss, bilateral        Plan:        vertigo unclear etiology given only one true episode, but given short of amount of time likely BPPV. Discussed etiology and treatment.  sharan-borrego pike was negative today so no epley performed.  recommmend VNG if symptoms recur or persist.  Do not recommend meclizine unless she has long lasting vertigo.  Agree with medical work up

## 2019-08-27 ENCOUNTER — HOSPITAL ENCOUNTER (OUTPATIENT)
Dept: RADIOLOGY | Facility: HOSPITAL | Age: 59
Discharge: HOME | End: 2019-08-27
Attending: INTERNAL MEDICINE
Payer: COMMERCIAL

## 2019-08-27 DIAGNOSIS — R42 VERTIGO: ICD-10-CM

## 2019-08-27 DIAGNOSIS — H93.A9 PULSATILE TINNITUS: ICD-10-CM

## 2019-08-27 PROCEDURE — 93880 EXTRACRANIAL BILAT STUDY: CPT | Mod: TC

## 2019-08-27 PROCEDURE — 93880 EXTRACRANIAL BILAT STUDY: CPT | Mod: 26,,, | Performed by: RADIOLOGY

## 2019-08-27 PROCEDURE — 93880 US CAROTID BILATERAL: ICD-10-PCS | Mod: 26,,, | Performed by: RADIOLOGY

## 2019-08-28 ENCOUNTER — TELEPHONE (OUTPATIENT)
Dept: INTERNAL MEDICINE | Facility: CLINIC | Age: 59
End: 2019-08-28

## 2019-08-28 NOTE — TELEPHONE ENCOUNTER
Spoke with pt, notified of message below. Pt says she has not checked BP at home. Asked pt to check pressure and call back with a few readings

## 2019-08-28 NOTE — TELEPHONE ENCOUNTER
----- Message from Shanice Singer sent at 8/28/2019 12:44 PM CDT -----  Contact: Patient [phone]  Patient had a Audiogram and US CAROTID BILAT and would like to speak with you about it.    Please call and advise.    Thank You

## 2019-08-28 NOTE — TELEPHONE ENCOUNTER
Spoke to patient. Patient states that she did have the carotid US done and was notified of results. Patient says she seen Dr Guillen in Otolaryngology 08/26 and verbalized that the dizziness is still persistent at that time. Physician advised not to drive yet patient feels she is still not at 100%. Patient also mentioned about the elevated BP readings. Asking what she can do at this going forward?    Please advise.

## 2019-08-28 NOTE — TELEPHONE ENCOUNTER
Recommend that if she has another episode of true vertigo (room spinning) she contact Dr. Kent because there is further testing he wants to do in that case.     Has she been checking BP at home? What are her readings?

## 2019-09-08 DIAGNOSIS — E78.49 OTHER HYPERLIPIDEMIA: ICD-10-CM

## 2019-09-09 RX ORDER — ATORVASTATIN CALCIUM 40 MG/1
TABLET, FILM COATED ORAL
Qty: 90 TABLET | Refills: 0 | Status: SHIPPED | OUTPATIENT
Start: 2019-09-09 | End: 2021-04-05 | Stop reason: SDUPTHER

## 2019-09-12 ENCOUNTER — OFFICE VISIT (OUTPATIENT)
Dept: SLEEP MEDICINE | Facility: CLINIC | Age: 59
End: 2019-09-12
Payer: COMMERCIAL

## 2019-09-12 VITALS
WEIGHT: 216.69 LBS | HEART RATE: 83 BPM | HEIGHT: 65 IN | BODY MASS INDEX: 36.1 KG/M2 | DIASTOLIC BLOOD PRESSURE: 77 MMHG | SYSTOLIC BLOOD PRESSURE: 150 MMHG

## 2019-09-12 DIAGNOSIS — G47.33 OBSTRUCTIVE SLEEP APNEA: Primary | ICD-10-CM

## 2019-09-12 PROCEDURE — 99999 PR PBB SHADOW E&M-EST. PATIENT-LVL III: ICD-10-PCS | Mod: PBBFAC,,, | Performed by: NURSE PRACTITIONER

## 2019-09-12 PROCEDURE — 99214 OFFICE O/P EST MOD 30 MIN: CPT | Mod: S$GLB,,, | Performed by: NURSE PRACTITIONER

## 2019-09-12 PROCEDURE — 99214 PR OFFICE/OUTPT VISIT, EST, LEVL IV, 30-39 MIN: ICD-10-PCS | Mod: S$GLB,,, | Performed by: NURSE PRACTITIONER

## 2019-09-12 PROCEDURE — 99999 PR PBB SHADOW E&M-EST. PATIENT-LVL III: CPT | Mod: PBBFAC,,, | Performed by: NURSE PRACTITIONER

## 2019-09-12 NOTE — PROGRESS NOTES
"Cydney Salazar is a 55 y.o. female is here for management of obstructive sleep apnea.       Continued nighty CPAP use;  Patient with loud snoring.  Daytime Fatigue, still relieved with nightly CPAP    Tolerating mask nasal Wisp, but broke recently hasn't been able to use it well.   Tolerating presures    CPAP interrogation set at 15-20 cm: 7d 2:16 hrs/n 30-day; AHI 0.8; 90% tile 16.7, 100% mask fit      BASELINE SLEEP STUDY 7/25/15: .7 of 83%; optimal cpap of 14 cm H20, split titration was limited by lack of supine REM sleep.        REVIEW OF SYSTEMS: Sleep related symptoms as per HPI. 8# gain. Denies interval medical change. Otherwise a balance review of 10-systems is negative.     PHYSICAL EXAM:  BP (!) 150/77 (BP Location: Left arm, Patient Position: Sitting, BP Method: Medium (Automatic))   Pulse 83   Ht 5' 5" (1.651 m)   Wt 98.3 kg (216 lb 11.2 oz)   BMI 36.06 kg/m²   GENERAL: Normal development, well groomed; Obesity      IMPRESSION:    1. Obstructive sleep apnea, severe by AHI; Responding to CPAP, with tolerance of pressure, despite high pressure requirement. If changes to her lung function, she may benefit from upgrade to BIPAP, given co-morbid sarcoidosis and reduction functional lung volumes; Most recent interrogation of CPAP reflects excellent usage and excellent control of her KAREY. Predicted AHI <5.     PLAN:     1. Continue auto CPAP 15 to 20 cm.  2. CPAP supplies; Nasal mask via [facility]    Sarcoidosis - followed by Dr. Owen     Education: During our discussion today, we talked about the effectiveness of her therapy as well as the potential ramifications of untreated sleep apnea, which could include daytime sleepiness, hypertension, heart disease and/or stroke.      rtc annually  "

## 2019-09-12 NOTE — PROGRESS NOTES
Cydney Salazar is a 55 y.o. female is here for management of obstructive sleep apnea.       NIghty CPAP use;  Patient with loud snoring.  Daytime Fatigue.  Relieved with nightly CPAP    Tolerating mask nasal Wisp  Tolerating presures    CPAP interrogation set at 15-20 cm: 2137 hours used; 8:06 hrs/n 30-day; AHI 1.2; 90% tile 17.5      BASELINE SLEEP STUDY 7/25/15: .7 of 83%; optimal cpap of 14 cm H20, split titration was limited by lack of supine REM sleep.      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    Cataract     Glaucoma     Hypertension     Sarcoidosis     in remission    Sleep apnea     uses cpap       Past Surgical History:   Procedure Laterality Date     SECTION      Embolization N/A 2018    [facility]    EMBOLIZATION N/A 2017    [facility]    EMBOLIZATION N/A 2017    [facility]    EMBOLIZATION, BLOOD VESSEL-AVM N/A 3/27/2019    [facility]    ir embolization of pulmonary AVM         Family History   Problem Relation Age of Onset    Glaucoma Mother     Hyperlipidemia Mother     Diabetes Daughter     Stroke Father     Diabetes Father     Hyperlipidemia Father     No Known Problems Sister     No Known Problems Brother     No Known Problems Maternal Aunt     No Known Problems Maternal Uncle     No Known Problems Paternal Aunt     No Known  "Problems Paternal Uncle     No Known Problems Maternal Grandmother     No Known Problems Maternal Grandfather     No Known Problems Paternal Grandmother     No Known Problems Paternal Grandfather     Amblyopia Neg Hx     Blindness Neg Hx     Cancer Neg Hx     Cataracts Neg Hx     Hypertension Neg Hx     Macular degeneration Neg Hx     Retinal detachment Neg Hx     Strabismus Neg Hx     Thyroid disease Neg Hx     Breast cancer Neg Hx     Colon cancer Neg Hx     Ovarian cancer Neg Hx        Social History     Tobacco Use    Smoking status: Never Smoker    Smokeless tobacco: Never Used    Tobacco comment:  smokes, usually not in the home   Substance Use Topics    Alcohol use: Yes     Comment: rarely    Drug use: No       ALLERGIES: Reviewed in EPIC    CURRENT MEDICATIONS: Reviewed in EPIC        REVIEW OF SYSTEMS:      Sleep related symptoms as per HPI.  CONST:Denies weight gain    HEENT: +sinus congestion improve with flonase  PULM: Denies dyspnea  CARD:  Denies palpitations   GI:  Denies acid reflux  : Denies polyuria  NEURO: Denies headaches  PSYCH: Denies mood disturbance  HEME: Denies anemia   Otherwise, a balance of systems reviewed is negative.           PHYSICAL EXAM:  BP (!) 150/77 (BP Location: Left arm, Patient Position: Sitting, BP Method: Medium (Automatic))   Pulse 83   Ht 5' 5" (1.651 m)   Wt 98.3 kg (216 lb 11.2 oz)   BMI 36.06 kg/m²   GENERAL: Normal development, well groomed; Obesity  HEENT:  Conjunctivae are non-erythematous; Pupils equal, round, and reactive to light; Nose is symmetrical; Nasal mucosa is pink and moist; Septum is midline; Inferior turbinates are normal; Nasal airflow is normal; Posterior pharynx is crowded due to lateral narrowing; Modified Mallampati: 4; Posterior palate is normal; Tonsils +1; Uvula is normal and pink;Tongue is normal; Dentition is fair; No TMJ tenderness; Jaw opening and protrusion without click and without discomfort.             "                             IMPRESSION:    1. Obstructive sleep apnea, severe by AHI; Responding to CPAP, with tolerance of pressure, despite high pressure requirement. If changes to her lung function, she may benefit from upgrade to BIPAP, given co-morbid sarcoidosis and reduction functional lung volumes; Most recent interrogation of CPAP reflects excellent usage and excellent control of her KAREY. Predicted AHI <5.     PLAN:     1. Continue auto CPAP 15 to 20 cm.  2. CPAP supplies; Nasal mask via [facility]    Sarcoidosis - followed by Dr. Aguilera     Education: During our discussion today, we talked about the etiology of obstructive sleep apnea as well as the potential ramifications of untreated sleep apnea, which could include daytime sleepiness, hypertension, heart disease and/or stroke.      Precautions: The patient was advised to abstain from driving should they feel sleepy or drowsy.      Follow up: Annually. MD/NP

## 2019-09-19 ENCOUNTER — PATIENT MESSAGE (OUTPATIENT)
Dept: INTERNAL MEDICINE | Facility: CLINIC | Age: 59
End: 2019-09-19

## 2019-11-08 ENCOUNTER — TELEPHONE (OUTPATIENT)
Dept: OBSTETRICS AND GYNECOLOGY | Facility: CLINIC | Age: 59
End: 2019-11-08

## 2019-11-08 NOTE — TELEPHONE ENCOUNTER
----- Message from Doris Bustamante sent at 11/8/2019  9:33 AM CST -----  Contact: KEMAL GALLAGHER [[id number]]  Name of Who is Calling: KEMAL GALLAGHER [[id number]]      What is the request in detail:  Pt is calling to speak with staff .Please call to further assist.      Can the clinic reply by [facility]: Prefer a phone call       [facility] Back [facility]: [phone]

## 2019-12-09 ENCOUNTER — PATIENT OUTREACH (OUTPATIENT)
Dept: ADMINISTRATIVE | Facility: OTHER | Age: 59
End: 2019-12-09

## 2019-12-11 ENCOUNTER — OFFICE VISIT (OUTPATIENT)
Dept: OBSTETRICS AND GYNECOLOGY | Facility: CLINIC | Age: 59
End: 2019-12-11
Payer: COMMERCIAL

## 2019-12-11 VITALS
BODY MASS INDEX: 36.14 KG/M2 | DIASTOLIC BLOOD PRESSURE: 84 MMHG | WEIGHT: 217.13 LBS | SYSTOLIC BLOOD PRESSURE: 154 MMHG

## 2019-12-11 DIAGNOSIS — Z12.4 ENCOUNTER FOR PAPANICOLAOU SMEAR FOR CERVICAL CANCER SCREENING: ICD-10-CM

## 2019-12-11 DIAGNOSIS — Z12.31 ENCOUNTER FOR SCREENING MAMMOGRAM FOR BREAST CANCER: ICD-10-CM

## 2019-12-11 DIAGNOSIS — Z11.51 SCREENING FOR HPV (HUMAN PAPILLOMAVIRUS): ICD-10-CM

## 2019-12-11 DIAGNOSIS — Z01.419 WOMEN'S ANNUAL ROUTINE GYNECOLOGICAL EXAMINATION: Primary | ICD-10-CM

## 2019-12-11 PROCEDURE — 87624 HPV HI-RISK TYP POOLED RSLT: CPT

## 2019-12-11 PROCEDURE — 99396 PR PREVENTIVE VISIT,EST,40-64: ICD-10-PCS | Mod: S$GLB,,, | Performed by: NURSE PRACTITIONER

## 2019-12-11 PROCEDURE — 99999 PR PBB SHADOW E&M-EST. PATIENT-LVL III: ICD-10-PCS | Mod: PBBFAC,,, | Performed by: NURSE PRACTITIONER

## 2019-12-11 PROCEDURE — 99999 PR PBB SHADOW E&M-EST. PATIENT-LVL III: CPT | Mod: PBBFAC,,, | Performed by: NURSE PRACTITIONER

## 2019-12-11 PROCEDURE — 99396 PREV VISIT EST AGE 40-64: CPT | Mod: S$GLB,,, | Performed by: NURSE PRACTITIONER

## 2019-12-11 PROCEDURE — 88175 CYTOPATH C/V AUTO FLUID REDO: CPT

## 2019-12-11 RX ORDER — NEBIVOLOL HYDROCHLORIDE 5 MG/1
TABLET ORAL
Refills: 3 | COMMUNITY
Start: 2019-11-21 | End: 2021-01-27

## 2019-12-11 RX ORDER — LOSARTAN POTASSIUM AND HYDROCHLOROTHIAZIDE 25; 100 MG/1; MG/1
TABLET ORAL
Refills: 3 | COMMUNITY
Start: 2019-12-02 | End: 2021-01-27

## 2019-12-11 NOTE — PROGRESS NOTES
CC: Annual  HPI: Pt is a 59 y.o.  female who presents for routine annual exam. 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  She has a 31 yo daughter who recently got . Denies any episodes of PMB.  She does not want STD screening.  Denies any GYN complaints.  The patient participates in regular exercise: no.  The patient does not smoke.  The patient wears seatbelts.   Pt denies any domestic violence.  She is in need of a screening mammo- last 11/18 WNL.  She is due for colonoscopy in 2020.      FH:  Breast cancer: none  Colon cancer: none  Ovarian cancer: none  Endometrial cancer: none    ROS:  GENERAL: Feeling well overall. Denies fever or chills.   SKIN: Denies rash or lesions.   HEAD: Denies head injury or headache.   NODES: Denies enlarged lymph nodes.   CHEST: Denies chest pain or shortness of breath.   CARDIOVASCULAR: Denies palpitations or left sided chest pain.   ABDOMEN: No abdominal pain, constipation, diarrhea, nausea, vomiting or rectal bleeding.   URINARY: No dysuria, hematuria, or burning on urination.  REPRODUCTIVE: See HPI.   BREASTS: Denies pain, lumps, or nipple discharge.   HEMATOLOGIC: No easy bruisability or excessive bleeding.   MUSCULOSKELETAL: Denies joint pain or swelling.   NEUROLOGIC: Denies syncope or weakness.   PSYCHIATRIC: Denies depression, anxiety or mood swings.    PE:   APPEARANCE: Well nourished, well developed, Black or  female in no acute distress.  NODES: no cervical, supraclavicular, or inguinal lymphadenopathy  BREASTS: Symmetrical, no skin changes or visible lesions. No palpable masses, nipple discharge or adenopathy bilaterally.  ABDOMEN: Soft. No tenderness or masses. No distention. No hernias palpated. No CVA tenderness.  VULVA: No lesions. Normal external female genitalia.  URETHRAL MEATUS: Normal size and location, no lesions, no prolapse.  URETHRA: No masses, tenderness, or prolapse.  VAGINA: Moist. No lesions or lacerations noted. No  abnormal discharge present. No odor present.   CERVIX: No lesions or discharge. No cervical motion tenderness.   UTERUS: Normal size, regular shape, mobile, non-tender.  ADNEXA: No tenderness. No fullness or masses palpated in the adnexal regions.   ANUS PERINEUM: Normal.      Diagnosis:  1. Women's annual routine gynecological examination    2. Encounter for Papanicolaou smear for cervical cancer screening    3. Screening for HPV (human papillomavirus)    4. Encounter for screening mammogram for breast cancer        Plan:   Pap/ HPV  Mammo     Orders Placed This Encounter    HPV High Risk Genotypes, PCR    Mammo Digital Screening Bilat w/ Raman    Liquid-Based Pap Smear, Screening       Patient was counseled today on the new ACS guidelines for cervical cytology screening as well as the current recommendations for breast cancer screening. She was counseled to follow up with her PCP for other routine health maintenance. Counseling session lasted approximately 10 minutes, and all her questions were answered.    Follow-up with me in 1 year for routine exam    MARLA Garcia

## 2019-12-13 ENCOUNTER — HOSPITAL ENCOUNTER (OUTPATIENT)
Dept: RADIOLOGY | Facility: HOSPITAL | Age: 59
Discharge: HOME | End: 2019-12-13
Attending: NURSE PRACTITIONER
Payer: COMMERCIAL

## 2019-12-13 VITALS — HEIGHT: 65 IN | BODY MASS INDEX: 36.18 KG/M2 | WEIGHT: 217.13 LBS

## 2019-12-13 DIAGNOSIS — Z12.31 ENCOUNTER FOR SCREENING MAMMOGRAM FOR BREAST CANCER: ICD-10-CM

## 2019-12-13 PROCEDURE — 77067 SCR MAMMO BI INCL CAD: CPT | Mod: TC

## 2019-12-13 PROCEDURE — 77063 MAMMO DIGITAL SCREENING BILAT WITH TOMOSYNTHESIS_CAD: ICD-10-PCS | Mod: 26,,, | Performed by: RADIOLOGY

## 2019-12-13 PROCEDURE — 77067 SCR MAMMO BI INCL CAD: CPT | Mod: 26,,, | Performed by: RADIOLOGY

## 2019-12-13 PROCEDURE — 77067 MAMMO DIGITAL SCREENING BILAT WITH TOMOSYNTHESIS_CAD: ICD-10-PCS | Mod: 26,,, | Performed by: RADIOLOGY

## 2019-12-13 PROCEDURE — 77063 BREAST TOMOSYNTHESIS BI: CPT | Mod: 26,,, | Performed by: RADIOLOGY

## 2019-12-19 LAB
HPV HR 12 DNA SPEC QL NAA+PROBE: NEGATIVE
HPV16 AG SPEC QL: NEGATIVE
HPV18 DNA SPEC QL NAA+PROBE: NEGATIVE

## 2020-01-01 LAB
FINAL PATHOLOGIC DIAGNOSIS: NORMAL
Lab: NORMAL

## 2020-01-15 ENCOUNTER — TELEPHONE (OUTPATIENT)
Dept: OPHTHALMOLOGY | Facility: CLINIC | Age: 60
End: 2020-01-15

## 2020-01-15 NOTE — TELEPHONE ENCOUNTER
Called patient and schedule him an appointment  ----- Message from George Aguilera sent at 1/15/2020  4:01 PM CST -----  Contact: pt @ [phone]  Pt is calling to schedule recall dated 01/16/20. Pt is requesting 02/05 or 02/12.

## 2020-03-27 DIAGNOSIS — Q25.72 PULMONARY ARTERIOVENOUS MALFORMATION: Primary | ICD-10-CM

## 2020-03-31 ENCOUNTER — TELEPHONE (OUTPATIENT)
Dept: INTERVENTIONAL RADIOLOGY/VASCULAR | Facility: CLINIC | Age: 60
End: 2020-03-31

## 2020-03-31 NOTE — TELEPHONE ENCOUNTER
Left message for pt to return my call. Need to schedule IR 1 yr follow up. Please forward call to [phone]. Thanks

## 2020-06-24 ENCOUNTER — OFFICE VISIT (OUTPATIENT)
Dept: OPTOMETRY | Facility: CLINIC | Age: 60
End: 2020-06-24
Payer: COMMERCIAL

## 2020-06-24 ENCOUNTER — CLINICAL SUPPORT (OUTPATIENT)
Dept: OPHTHALMOLOGY | Facility: CLINIC | Age: 60
End: 2020-06-24
Payer: COMMERCIAL

## 2020-06-24 DIAGNOSIS — Z13.5 SCREENING FOR EYE CONDITION: ICD-10-CM

## 2020-06-24 DIAGNOSIS — H52.201 HYPEROPIA OF RIGHT EYE WITH ASTIGMATISM: ICD-10-CM

## 2020-06-24 DIAGNOSIS — I10 ESSENTIAL HYPERTENSION: ICD-10-CM

## 2020-06-24 DIAGNOSIS — H40.1121 PRIMARY OPEN ANGLE GLAUCOMA (POAG) OF LEFT EYE, MILD STAGE: ICD-10-CM

## 2020-06-24 DIAGNOSIS — D86.9 SARCOIDOSIS: ICD-10-CM

## 2020-06-24 DIAGNOSIS — H40.053 OCULAR HYPERTENSION, BILATERAL: Primary | ICD-10-CM

## 2020-06-24 DIAGNOSIS — H52.01 HYPEROPIA OF RIGHT EYE WITH ASTIGMATISM: ICD-10-CM

## 2020-06-24 DIAGNOSIS — H52.02 HYPERMETROPIA OF LEFT EYE: ICD-10-CM

## 2020-06-24 DIAGNOSIS — H52.4 PRESBYOPIA OF BOTH EYES: ICD-10-CM

## 2020-06-24 DIAGNOSIS — H25.13 NUCLEAR SCLEROSIS OF BOTH EYES: ICD-10-CM

## 2020-06-24 PROCEDURE — 92083 HUMPHREY VISUAL FIELD - OU - BOTH EYES: ICD-10-PCS | Mod: S$GLB,,, | Performed by: OPTOMETRIST

## 2020-06-24 PROCEDURE — 99999 PR PBB SHADOW E&M-EST. PATIENT-LVL III: CPT | Mod: PBBFAC,,, | Performed by: OPTOMETRIST

## 2020-06-24 PROCEDURE — 92014 PR EYE EXAM, EST PATIENT,COMPREHESV: ICD-10-PCS | Mod: S$GLB,,, | Performed by: OPTOMETRIST

## 2020-06-24 PROCEDURE — 92014 COMPRE OPH EXAM EST PT 1/>: CPT | Mod: S$GLB,,, | Performed by: OPTOMETRIST

## 2020-06-24 PROCEDURE — 92015 PR REFRACTION: ICD-10-PCS | Mod: S$GLB,,, | Performed by: OPTOMETRIST

## 2020-06-24 PROCEDURE — 92015 DETERMINE REFRACTIVE STATE: CPT | Mod: S$GLB,,, | Performed by: OPTOMETRIST

## 2020-06-24 PROCEDURE — 99999 PR PBB SHADOW E&M-EST. PATIENT-LVL III: ICD-10-PCS | Mod: PBBFAC,,, | Performed by: OPTOMETRIST

## 2020-06-24 PROCEDURE — 92083 EXTENDED VISUAL FIELD XM: CPT | Mod: S$GLB,,, | Performed by: OPTOMETRIST

## 2020-06-24 RX ORDER — NEBIVOLOL HYDROCHLORIDE 20 MG/1
TABLET ORAL
COMMUNITY
Start: 2020-05-27 | End: 2021-06-09 | Stop reason: SDUPTHER

## 2020-06-24 RX ORDER — BRIMONIDINE TARTRATE, TIMOLOL MALEATE 2; 5 MG/ML; MG/ML
1 SOLUTION/ DROPS OPHTHALMIC 2 TIMES DAILY
Qty: 15 ML | Refills: 0 | Status: SHIPPED | OUTPATIENT
Start: 2020-06-24 | End: 2020-12-02 | Stop reason: ALTCHOICE

## 2020-06-24 RX ORDER — IRBESARTAN AND HYDROCHLOROTHIAZIDE 300; 12.5 MG/1; MG/1
TABLET, FILM COATED ORAL
COMMUNITY
Start: 2020-06-04 | End: 2021-10-01 | Stop reason: SDUPTHER

## 2020-06-24 NOTE — PATIENT INSTRUCTIONS
Prior diagnosis of bilateral ocular hypertension.  Repeat HVF test done today.  Mild stage primary open angle glaucoma in the left eye, per review of HVF test results.     Currently using Combigan Ophthalmic Solution two times per day in both eyes for IOP control.  Intraocular pressure high-normal/borderline in each eye today.    Continue to instill Combigan Ophthalmic Solution (one drop two times per day) in both eyes.      Early nuclear sclerosis in both eyes.     History of sarcoidosis without ophthalmic manifestation.     Hyperopia with asitgmatism in the right eye and hyperopia in the left eye, with good correctable VA in each eye.  Presbyopia consistent with age.   New spectacle lens Rx issued for use as desired.     Recheck in six months, with repeat HVF test (24-2 RORY Standard) at that visit.

## 2020-06-24 NOTE — PROGRESS NOTES
"HPI     Ocular Hypertension      Additional comments: Annual general eye examination and refraction.   No problems              Comments     Patient's age: 60 y.o. female  Occupation: [employer]   Approximate date of last eye examination:  01/09/2019  Name of last eye doctor seen: Dr Romero   City/State: [place]   Wears glasses? No     If yes, wears  Full-time or part-time?  Part time OTC readers  Present glasses are:   SV OTC Readers +2.00  Approximate age of present glasses:  n/a  Got new glasses following last exam, or subsequently?:  NO    Any problem with VA with glasses?  No problems  Wears CLs?:  No   Headaches?  No   Eye pain/discomfort?  No                                                                                     Flashes?  No   Floaters?  No   Diplopia/Double vision?  No   Patient's Ocular History:          Any eye surgeries? No          Any eye injury?  No          Any treatment for eye disease?  POAG   Family history of eye disease?  No   Significant patient medical history:         1. Diabetes?  No      If yes, IDDM or NIDDM? NA           2. HBP?  Yes, controlled by medication               ! OTC eyedrops currently using:  No    ! Prescription eye meds currently using:  Combigan 0.20 / 0.5 % BID OU   ! Any history of allergy/adverse reaction to any eye meds used   previously?  No    ! Any history of allergy/adverse reaction to eyedrops used during prior   eye exam(s)? No    ! Any history of allergy/adverse reaction to Novacaine or similar meds?   No    ! Any history of allergy/adverse reaction to Epinephrine or similar meds?   No    ! Patient okay with use of anesthetic eyedrops to check eye pressure?    Yes        ! Patient okay with use of eyedrops to dilate pupils today?  Yes    !  Allergies/Medications/Medical History/Family History reviewed today?    Yes       PD =   65/61  Desired reading distance =    15.25"                            Last edited by Darrius Romero, OD on " 6/24/2020 11:32 AM. (History)            Assessment /Plan     For exam results, see Encounter Report.    1. Ocular hypertension, bilateral  Nick Visual Field - OU - Extended - Both Eyes   2. Primary open angle glaucoma (POAG) of left eye, mild stage  Nick Visual Field - OU - Extended - Both Eyes   3. Nuclear sclerosis of both eyes     4. Sarcoidosis     5. Essential hypertension     6. Screening for eye condition     7. Hyperopia of right eye with astigmatism     8. Hypermetropia of left eye     9. Presbyopia of both eyes                  Prior diagnosis of bilateral ocular hypertension.  Repeat HVF test done today.  Mild stage primary open angle glaucoma in the left eye, per review of HVF test results.     Currently using Combigan Ophthalmic Solution two times per day in both eyes for IOP control.  Intraocular pressure high-normal/borderline in each eye today.    Continue to instill Combigan Ophthalmic Solution (one drop two times per day) in both eyes.      Early nuclear sclerosis in both eyes.     History of sarcoidosis without ophthalmic manifestation.     Hyperopia with asitgmatism in the right eye and hyperopia in the left eye, with good correctable VA in each eye.  Presbyopia consistent with age.   New spectacle lens Rx issued for use as desired.     Recheck in six months , with repeat HVF test (24-2 RORY Standard) at that visit.

## 2020-09-21 ENCOUNTER — PATIENT OUTREACH (OUTPATIENT)
Dept: ADMINISTRATIVE | Facility: OTHER | Age: 60
End: 2020-09-21

## 2020-09-22 NOTE — PROGRESS NOTES
Health Maintenance Due   Topic Date Due    HIV Screening  01/07/1975    Shingles Vaccine (1 of 2) 01/07/2012    Influenza Vaccine (1) 08/01/2020     Updates were requested from care everywhere.  Chart was reviewed for overdue Proactive [facility] Encounters (BANG) topics (CRS, Breast Cancer Screening, Eye exam)  Health Maintenance has been updated.  LINKS immunization registry triggered.  Immunizations were reconciled.

## 2020-09-23 ENCOUNTER — OFFICE VISIT (OUTPATIENT)
Dept: SLEEP MEDICINE | Facility: CLINIC | Age: 60
End: 2020-09-23
Payer: COMMERCIAL

## 2020-09-23 VITALS
WEIGHT: 220 LBS | DIASTOLIC BLOOD PRESSURE: 76 MMHG | BODY MASS INDEX: 36.61 KG/M2 | SYSTOLIC BLOOD PRESSURE: 188 MMHG | HEART RATE: 69 BPM

## 2020-09-23 DIAGNOSIS — G47.33 OBSTRUCTIVE SLEEP APNEA: Primary | ICD-10-CM

## 2020-09-23 PROCEDURE — 99213 OFFICE O/P EST LOW 20 MIN: CPT | Mod: S$GLB,,, | Performed by: NURSE PRACTITIONER

## 2020-09-23 PROCEDURE — 99999 PR PBB SHADOW E&M-EST. PATIENT-LVL III: ICD-10-PCS | Mod: PBBFAC,,, | Performed by: NURSE PRACTITIONER

## 2020-09-23 PROCEDURE — 99999 PR PBB SHADOW E&M-EST. PATIENT-LVL III: CPT | Mod: PBBFAC,,, | Performed by: NURSE PRACTITIONER

## 2020-09-23 PROCEDURE — 99213 PR OFFICE/OUTPT VISIT, EST, LEVL III, 20-29 MIN: ICD-10-PCS | Mod: S$GLB,,, | Performed by: NURSE PRACTITIONER

## 2020-09-23 NOTE — PROGRESS NOTES
Cydney Salazar returns today for mgt of KAREY    Annual visit. Using her machine so much better/longer and nightly!!. Cleaning her mask/headstrap and getting regular supplies. Snoring resolved and sleep consolidated. Wants new machine.     CPAP interrogation: AHI 1.7, avg 8:54h/night. 0-11% leak. 0% PB. 90% tile 16.6cm  Didn't take bp meds yet today, just off this am after night inventory      BASELINE SLEEP STUDY 7/25/15: .7 of 83%; optimal cpap of 14 cm H20, split titration was limited by lack of supine REM sleep.        REVIEW OF SYSTEMS: Sleep related symptoms as per HPI. Denies interval medical change. Otherwise a balance review of 10-systems is negative.     PHYSICAL EXAM:  BP (!) 188/76   Pulse 69   Wt 99.8 kg (220 lb)   LMP  (LMP Unknown)   BMI 36.61 kg/m²   GENERAL: Normal development, well groomed; Obesity      IMPRESSION:    1. Obstructive sleep apnea, very severe. Excellent adherence with PAP, benefiting from therapy . AHi<5. Eligible new machine     PLAN:     1. Continue auto CPAP 15 to 20 cm.NEW machine, rtc b/t 31-90d after setup adherence  2.  Discussed effectiveness of her therapy and potential ramifications of untreated KAREY, including heart disease, hypertension, cognitive difficulties, stroke, and diabetes.

## 2020-10-05 ENCOUNTER — PATIENT MESSAGE (OUTPATIENT)
Dept: ADMINISTRATIVE | Facility: HOSPITAL | Age: 60
End: 2020-10-05

## 2020-10-07 ENCOUNTER — TELEPHONE (OUTPATIENT)
Dept: OBSTETRICS AND GYNECOLOGY | Facility: CLINIC | Age: 60
End: 2020-10-07

## 2020-10-07 NOTE — TELEPHONE ENCOUNTER
Returned patient call , scheduled with [place]     ----- Message from Renetta Cortes sent at 10/7/2020  9:14 AM CDT -----  Regarding: Patient Call Back  Who Called:KEMAL GALLAGHER [[id number]]    What is the request in detail:In regards to scheduling her annual visit possibly on a Wednesday.    Can the clinic reply by  [facility]?No     Best Call Back Number:[phone]

## 2020-11-30 ENCOUNTER — PATIENT OUTREACH (OUTPATIENT)
Dept: ADMINISTRATIVE | Facility: OTHER | Age: 60
End: 2020-11-30

## 2020-11-30 NOTE — PROGRESS NOTES
Requested updates within Care Everywhere.  Patient's chart was reviewed for overdue BANG topics.  Immunizations reconciled.

## 2020-12-02 ENCOUNTER — CLINICAL SUPPORT (OUTPATIENT)
Dept: OPHTHALMOLOGY | Facility: CLINIC | Age: 60
End: 2020-12-02
Attending: OPTOMETRIST
Payer: COMMERCIAL

## 2020-12-02 ENCOUNTER — OFFICE VISIT (OUTPATIENT)
Dept: OPTOMETRY | Facility: CLINIC | Age: 60
End: 2020-12-02
Payer: COMMERCIAL

## 2020-12-02 DIAGNOSIS — H40.053 OCULAR HYPERTENSION, BILATERAL: ICD-10-CM

## 2020-12-02 DIAGNOSIS — H40.1131 PRIMARY OPEN ANGLE GLAUCOMA (POAG) OF BOTH EYES, MILD STAGE: ICD-10-CM

## 2020-12-02 DIAGNOSIS — H40.053 OCULAR HYPERTENSION, BILATERAL: Primary | ICD-10-CM

## 2020-12-02 DIAGNOSIS — H40.1121 PRIMARY OPEN ANGLE GLAUCOMA (POAG) OF LEFT EYE, MILD STAGE: ICD-10-CM

## 2020-12-02 DIAGNOSIS — H25.13 NUCLEAR SCLEROSIS OF BOTH EYES: ICD-10-CM

## 2020-12-02 DIAGNOSIS — D86.9 SARCOIDOSIS: ICD-10-CM

## 2020-12-02 PROCEDURE — 99999 PR PBB SHADOW E&M-EST. PATIENT-LVL III: CPT | Mod: PBBFAC,,, | Performed by: OPTOMETRIST

## 2020-12-02 PROCEDURE — 92012 INTRM OPH EXAM EST PATIENT: CPT | Mod: S$GLB,,, | Performed by: OPTOMETRIST

## 2020-12-02 PROCEDURE — 99999 PR PBB SHADOW E&M-EST. PATIENT-LVL III: ICD-10-PCS | Mod: PBBFAC,,, | Performed by: OPTOMETRIST

## 2020-12-02 PROCEDURE — 92012 PR EYE EXAM, EST PATIENT,INTERMED: ICD-10-PCS | Mod: S$GLB,,, | Performed by: OPTOMETRIST

## 2020-12-02 RX ORDER — TIMOLOL MALEATE 5 MG/ML
1 SOLUTION/ DROPS OPHTHALMIC 2 TIMES DAILY
Qty: 15 ML | Refills: 1
Start: 2020-12-02 | End: 2021-01-26 | Stop reason: SDUPTHER

## 2020-12-02 NOTE — PROGRESS NOTES
HPI     Ocular Hypertension      Additional comments: No complaints              Comments     Patient's age: 60 y.o. female  Occupation: [employer]   Approximate date of last eye examination:  06/24/2020  Name of last eye doctor seen: Dr Romero   City/State: [facility]   Wears glasses? No     If yes, wears  Full-time or part-time?  Part time OTC readers  Present glasses are:   SV OTC Readers +2.00  Approximate age of present glasses:  n/a  Got new glasses following last exam, or subsequently?:  NO    Any problem with VA with glasses?  No problems  Wears CLs?:  No   Headaches?  No   Eye pain/discomfort?  No                                                                                     Flashes?  No   Floaters?  No   Diplopia/Double vision?  No   Patient's Ocular History:          Any eye surgeries? No          Any eye injury?  No          Any treatment for eye disease?  POAG   Family history of eye disease?  No   Significant patient medical history:         1. Diabetes?  No      If yes, IDDM or NIDDM? NA           2. HBP?  Yes, controlled by medication               ! OTC eyedrops currently using:  No    ! Prescription eye meds currently using:  Combigan 0.20% / Timolol   Maleate 0.5 % BID OU   ! Any history of allergy/adverse reaction to any eye meds used   previously?  No    ! Any history of allergy/adverse reaction to eyedrops used during prior   eye exam(s)? No    ! Any history of allergy/adverse reaction to Novacaine or similar meds?   No    ! Any history of allergy/adverse reaction to Epinephrine or similar meds?   No    ! Patient okay with use of anesthetic eyedrops to check eye pressure?    Yes        ! Patient okay with use of eyedrops to dilate pupils today?  Yes    !  Allergies/Medications/Medical History/Family History reviewed today?    Yes       PD =     Desired reading distance =                                Last edited by Darrius Romero, OD on 12/2/2020 12:21 PM. (History)             Assessment /Plan     1. Ocular hypertension, bilateral  timolol maleate 0.5% (TIMOPTIC) 0.5 % Drop   2. Nuclear sclerosis of both eyes     3. Sarcoidosis     4. Primary open angle glaucoma (POAG) of both eyes, mild stage                      Prior diagnosis of bilateral ocular hypertension, and mild stage primary open angle glaucoma of the left eye.  Currently using Combigan ophthalmic solution in both eyes two times per day for IOP control/reduction.  In today for recheck of IOP and for discussion of results of repeat HVF test (24-2) done today.    Repeat HVF test (24-2) done today.  Reviewed results of repeat HVF test (24-2) done today.  Refer to results in OIS.      OD  Results appear borderline reliable (at worst) 2/2 3/15 fixation losses      OS  Results appear reliable X 3    Taking results at face:       OD  Superior paracentral defect suggestive of early glaucomatous defect       OS  Focal defect superonasal quadrant suggestive of possible early nasal step defect      IOP of 20 mm Hg in the right eye and 20 mm Hg in the left eye today.  However, Ms. Salazar has been advised that her insurance carrier will not cover the cost of Combigan Ophthalmic Solution after 01/01/2021, and she requests Rx for another drop which may be covered by her insurance carrier.  Issued sample of timolol maleate 0.5% ophthalmic solution.  Use two times per day in both eyes in lieu of Combigan.  Return in two weeks for IOP recheck.  Ms. Salazar will call  to schedule that appointment.

## 2020-12-02 NOTE — PROGRESS NOTES
24-2 SS Completed.    Patient stated no Allergies to Latex or Adhesives. Used coverlet patch.    Reliability & Fix (OD Fair)(OS Good)  Cooperation (Good)      Glasses Prescription     Sphere Cylinder Axis Dist VA Add   Right +0.50 +0.25 095 20/20 +2.50   Left +0.75 Sphere  20/20 +2.50   Comments: ST bifocal, OR single vision distance lenses and/or single vision reading lenses.   ST trifocal okay, if desired       GM

## 2020-12-02 NOTE — PATIENT INSTRUCTIONS
Prior diagnosis of bilateral ocular hypertension, and mild stage primary open angle glaucoma of the left eye.  Currently using Combigan ophthalmic solution in both eyes two times per day for IOP control/reduction.  In today for recheck of IOP and for discussion of results of repeat HVF test (24-2) done today.    Repeat HVF test (24-2) done today.  Reviewed results of repeat HVF test (24-2) done today.  Refer to results in OIS.      OD  Results appear borderline reliable (at worst) 2/2 3/15 fixation losses      OS  Results appear reliable X 3    Taking results at face:       OD  Superior paracentral defect suggestive of early glaucomatous defect       OS  Focal defect superonasal quadrant suggestive of possible early nasal step defect      IOP of 20 mm Hg in the right eye and 20 mm Hg in the left eye today.  However, Ms. Salazar has been advised that her insurance carrier will not cover the cost of Combigan Ophthalmic Solution after 01/01/2021, and she requests Rx for another drop which may be covered by her insurance carrier.  Issued sample of timolol maleate 0.5% ophthalmic solution.  Use two times per day in both eyes in lieu of Combigan.  Return in two weeks for IOP recheck.  Ms. Salazar will call  to schedule that appointment.

## 2020-12-16 ENCOUNTER — OFFICE VISIT (OUTPATIENT)
Dept: OBSTETRICS AND GYNECOLOGY | Facility: CLINIC | Age: 60
End: 2020-12-16
Payer: COMMERCIAL

## 2020-12-16 ENCOUNTER — OFFICE VISIT (OUTPATIENT)
Dept: OPTOMETRY | Facility: CLINIC | Age: 60
End: 2020-12-16
Payer: COMMERCIAL

## 2020-12-16 VITALS
DIASTOLIC BLOOD PRESSURE: 85 MMHG | BODY MASS INDEX: 36.87 KG/M2 | WEIGHT: 221.31 LBS | SYSTOLIC BLOOD PRESSURE: 130 MMHG | HEIGHT: 65 IN

## 2020-12-16 DIAGNOSIS — H25.13 NUCLEAR SCLEROSIS OF BOTH EYES: ICD-10-CM

## 2020-12-16 DIAGNOSIS — Z12.31 ENCOUNTER FOR SCREENING MAMMOGRAM FOR MALIGNANT NEOPLASM OF BREAST: ICD-10-CM

## 2020-12-16 DIAGNOSIS — H40.1131 PRIMARY OPEN ANGLE GLAUCOMA (POAG) OF BOTH EYES, MILD STAGE: ICD-10-CM

## 2020-12-16 DIAGNOSIS — H40.1121 PRIMARY OPEN ANGLE GLAUCOMA (POAG) OF LEFT EYE, MILD STAGE: ICD-10-CM

## 2020-12-16 DIAGNOSIS — Z01.419 WELL WOMAN EXAM WITH ROUTINE GYNECOLOGICAL EXAM: Primary | ICD-10-CM

## 2020-12-16 DIAGNOSIS — H40.053 OCULAR HYPERTENSION, BILATERAL: Primary | ICD-10-CM

## 2020-12-16 PROCEDURE — 99999 PR PBB SHADOW E&M-EST. PATIENT-LVL III: ICD-10-PCS | Mod: PBBFAC,,, | Performed by: STUDENT IN AN ORGANIZED HEALTH CARE EDUCATION/TRAINING PROGRAM

## 2020-12-16 PROCEDURE — 99999 PR PBB SHADOW E&M-EST. PATIENT-LVL III: ICD-10-PCS | Mod: PBBFAC,,, | Performed by: OPTOMETRIST

## 2020-12-16 PROCEDURE — 99396 PR PREVENTIVE VISIT,EST,40-64: ICD-10-PCS | Mod: S$GLB,,, | Performed by: STUDENT IN AN ORGANIZED HEALTH CARE EDUCATION/TRAINING PROGRAM

## 2020-12-16 PROCEDURE — 99499 UNLISTED E&M SERVICE: CPT | Mod: S$GLB,,, | Performed by: OPTOMETRIST

## 2020-12-16 PROCEDURE — 99999 PR PBB SHADOW E&M-EST. PATIENT-LVL III: CPT | Mod: PBBFAC,,, | Performed by: OPTOMETRIST

## 2020-12-16 PROCEDURE — 99396 PREV VISIT EST AGE 40-64: CPT | Mod: S$GLB,,, | Performed by: STUDENT IN AN ORGANIZED HEALTH CARE EDUCATION/TRAINING PROGRAM

## 2020-12-16 PROCEDURE — 99999 PR PBB SHADOW E&M-EST. PATIENT-LVL III: CPT | Mod: PBBFAC,,, | Performed by: STUDENT IN AN ORGANIZED HEALTH CARE EDUCATION/TRAINING PROGRAM

## 2020-12-16 PROCEDURE — 99499 NO LOS: ICD-10-PCS | Mod: S$GLB,,, | Performed by: OPTOMETRIST

## 2020-12-16 NOTE — PROGRESS NOTES
History & Physical  Gynecology      SUBJECTIVE:     Chief Complaint: Well Woman       History of Present Illness:  60 y.o. postmenopausal female  here for annual.  Otherwise no GYN complaints.  Father recently dx with stage IV cancer (primary unknown), struggling with the emotional aspects and stress of being primary caretaker.   Denies postmenopausal bleeding.  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    Cataract     Glaucoma     Hypertension     Sarcoidosis     in remission    Sleep apnea     uses cpap     Past Surgical History:   Procedure Laterality Date     SECTION      EMBOLIZATION N/A 2018    Procedure: Embolization;  Surgeon: Whit Surgeon;  Location: [facility];  Service: Radiology;  Laterality: N/A;    EMBOLIZATION N/A 3/27/2019    Procedure: EMBOLIZATION, BLOOD VESSEL-AVM;  Surgeon: Whit Surgeon;  Location: [facility];  Service: Anesthesiology;  Laterality: N/A;  Room 189/[facility]    ir embolization of pulmonary AVM       OB History        1    Para   1    Term   0       0    AB   0    Living   1       SAB   0    TAB   0    Ectopic   0    Multiple   0    Live Births   1               Family History   Problem Relation Age of Onset    Glaucoma Mother     Hyperlipidemia Mother     Diabetes Daughter     Stroke Father     Diabetes Father     Hyperlipidemia Father     No Known Problems Sister     No Known Problems Brother     No Known Problems Maternal Aunt     No Known Problems Maternal Uncle     No Known Problems Paternal Aunt     No Known Problems Paternal Uncle      No Known Problems Maternal Grandmother     No Known Problems Maternal Grandfather     No Known Problems Paternal Grandmother     No Known Problems Paternal Grandfather     Amblyopia Neg Hx     Blindness Neg Hx     Cancer Neg Hx     Cataracts Neg Hx     Hypertension Neg Hx     Macular degeneration Neg Hx     Retinal detachment Neg Hx     Strabismus Neg Hx     Thyroid disease Neg Hx     Breast cancer Neg Hx     Colon cancer Neg Hx     Ovarian cancer Neg Hx      Social History     Tobacco Use    Smoking status: Never Smoker    Smokeless tobacco: Never Used    Tobacco comment:  smokes, usually not in the home   Substance Use Topics    Alcohol use: Yes     Comment: rarely    Drug use: No       Current Outpatient Medications   Medication Sig    atorvastatin (LIPITOR) 40 MG tablet TAKE 1 TABLET BY MOUTH EVERY EVENING    BYSTOLIC 20 mg Tab TK 1 T PO Q MORNING.    BYSTOLIC 5 mg Tab TK 1 T PO QAM    fluticasone (FLONASE) 50 mcg/actuation nasal spray 1-2 sprays @ nostril once a day    hydroCHLOROthiazide (HYDRODIURIL) 25 MG tablet Take 1 tablet (25 mg total) by mouth once daily.    irbesartan-hydrochlorothiazide (AVALIDE) 300-12.5 mg per tablet TK 1 T PO QAM    losartan (COZAAR) 100 MG tablet Take 1 tablet (100 mg total) by mouth once daily.    losartan-hydrochlorothiazide 100-25 mg (HYZAAR) 100-25 mg per tablet TK 1 T PO QD    meclizine (ANTIVERT) 25 mg tablet Take 1 tablet (25 mg total) by mouth 3 (three) times daily as needed for Dizziness.    timolol maleate 0.5% (TIMOPTIC) 0.5 % Drop Place 1 drop into both eyes 2 (two) times daily.    albuterol (PROAIR HFA) 90 mcg/actuation HFAA Inhale into the lungs. 2 Aerosol Inhalation Every 3-4 hours     No current facility-administered medications for this visit.          Review of Systems:  Review of Systems   Constitutional: Positive for fatigue. Negative for chills and fever.   HENT: Negative for nasal congestion.    Respiratory: Positive  for shortness of breath (stable).    Cardiovascular: Negative for chest pain and leg swelling.   Gastrointestinal: Negative for abdominal pain, constipation, diarrhea, nausea and vomiting.   Endocrine: Negative for hypothyroidism.   Genitourinary: Negative for dysuria, pelvic pain, vaginal discharge, vaginal pain and postmenopausal bleeding.   Musculoskeletal: Negative for joint swelling and myalgias.   Integumentary:  Negative for rash, breast mass, nipple discharge, breast skin changes and breast tenderness.   Neurological: Negative for headaches.   Psychiatric/Behavioral: Positive for depression (situational, re father's diagnosis/prognosis). The patient is not nervous/anxious.    Breast: Negative for lump, mass, mastodynia, nipple discharge, skin changes and tenderness       OBJECTIVE:     Physical Exam:  Physical Exam  Exam conducted with a chaperone present.   Constitutional:       General: She is not in acute distress.     Appearance: Normal appearance. She is well-developed. She is obese.   HENT:      Head: Normocephalic and atraumatic.   Eyes:      Conjunctiva/sclera: Conjunctivae normal.   Pulmonary:      Effort: Pulmonary effort is normal. No respiratory distress.   Chest:      Breasts:         Right: No inverted nipple, mass, nipple discharge, skin change or tenderness.         Left: No inverted nipple, mass, nipple discharge, skin change or tenderness.   Abdominal:      General: There is no distension.      Palpations: Abdomen is soft. There is no mass.      Tenderness: There is no abdominal tenderness. There is no guarding or rebound.      Hernia: No hernia is present.   Genitourinary:     General: Normal vulva.      Pubic Area: No rash.       Labia:         Right: No rash, tenderness or lesion.         Left: No rash, tenderness or lesion.       Urethra: No prolapse, urethral pain, urethral swelling or urethral lesion.      Vagina: Normal. No vaginal discharge, tenderness or bleeding.      Cervix: No  cervical motion tenderness, discharge, friability or lesion.      Uterus: Normal. Not enlarged and not tender.       Adnexa: Right adnexa normal and left adnexa normal.        Right: No mass, tenderness or fullness.          Left: No mass, tenderness or fullness.     Musculoskeletal: Normal range of motion.         General: No tenderness.      Right lower leg: No edema.      Left lower leg: No edema.   Skin:     General: Skin is warm and dry.      Findings: No erythema.   Neurological:      Mental Status: She is alert and oriented to person, place, and time.   Psychiatric:         Mood and Affect: Mood normal.         Behavior: Behavior normal.           ASSESSMENT:       ICD-10-CM ICD-9-CM    1. Well woman exam with routine gynecological exam  Z01.419 V72.31 Mammo Digital Screening Bilat   2. Encounter for screening mammogram for malignant neoplasm of breast  Z12.31 V76.12 Mammo Digital Screening Bilat          Plan:      Pap up to date  MMG scheduled  Colonoscopy due 2023 per [facility] care gap review, patient agrees but will see if can find records from scope    Discussed situational depression and appropriate grief responses. If the grief and/or stress begin to affect functioning day to day, let me or other medical professional know.  Comfort provided.     Counseling time: 30 minutes    Alejandrina Adame

## 2020-12-17 NOTE — PATIENT INSTRUCTIONS
Prior diagnosis of bilateral ocular hypertension, and mild stage primary open angle glaucoma of the left eye.  Currently using Timolol Maleate 0.5% ophthalmic solution in both eyes two times per day for IOP control/reduction, in lieu of Combigan ophthalmic solution, as insurance will no longer pay for Combigan.  In today for recheck of IOP,    HVF (24-2) done at last visit  Taking results at face:       OD  Superior paracentral defect suggestive of early glaucomatous defect       OS  Focal defect superonasal quadrant suggestive of possible early nasal step defect        IOP of 18  mm Hg in the right eye and 16 mm Hg in the left eye today, which represents a slight improvement in the intraocular pressure in each eye, compared to that achieve with Combigan Ophthalmic Solution at last visit    Continue to instill one drop of Timolol Maleate 0.5% ophthalmic solution into both eye two times per day.  Recheck in six months for recheck with repeat Nick Visual Field test 24-2).   Orders entered for future HVF test.

## 2020-12-18 NOTE — PROGRESS NOTES
HPI     Patient in for progress check.    Patient returns for IOP check.     Being followed for (diagnosis):   Ocular hypertension    Date last seen:  12/02/2020    Doctor last seen:      Prescribed eye medications(s) using:  Timolol BID OU, in lieu of Combigan   Ophthalmic Solution two times per day in both eyes.    OTC eye medication(s) using:  None               Last edited by Darrius Romero, OD on 12/18/2020 10:24 AM. (History)            Assessment /Plan     For exam results, see Encounter Report.    1. Ocular hypertension, bilateral  Nick Visual Field - OU - Extended - Both Eyes   2. Primary open angle glaucoma (POAG) of both eyes, mild stage  Nick Visual Field - OU - Extended - Both Eyes   3. Primary open angle glaucoma (POAG) of left eye, mild stage  Nick Visual Field - OU - Extended - Both Eyes   4. Nuclear sclerosis of both eyes                      Prior diagnosis of bilateral ocular hypertension, and mild stage primary open angle glaucoma of the left eye.  Currently using Timolol Maleate 0.5% ophthalmic solution in both eyes two times per day for IOP control/reduction, in lieu of Combigan ophthalmic solution, as insurance will no longer pay for Combigan.  In today for recheck of IOP,    HVF (24-2) done at last visit  Taking results at face:       OD  Superior paracentral defect suggestive of early glaucomatous defect       OS  Focal defect superonasal quadrant suggestive of possible early nasal step defect        IOP of 18  mm Hg in the right eye and 16 mm Hg in the left eye today, which represents a slight improvement in the intraocular pressure in each eye, compared to that achieve with Combigan Ophthalmic Solution at last visit    Continue to instill one drop of Timolol Maleate 0.5% ophthalmic solution into both eye two times per day.  Recheck in six months for recheck with repeat Nick Visual Field test 24-2).   Orders entered for future HVF test.

## 2020-12-30 ENCOUNTER — HOSPITAL ENCOUNTER (OUTPATIENT)
Dept: RADIOLOGY | Facility: HOSPITAL | Age: 60
Discharge: HOME | End: 2020-12-30
Attending: STUDENT IN AN ORGANIZED HEALTH CARE EDUCATION/TRAINING PROGRAM
Payer: COMMERCIAL

## 2020-12-30 VITALS — HEIGHT: 65 IN | WEIGHT: 221.56 LBS | BODY MASS INDEX: 36.91 KG/M2

## 2020-12-30 DIAGNOSIS — Z12.31 ENCOUNTER FOR SCREENING MAMMOGRAM FOR MALIGNANT NEOPLASM OF BREAST: ICD-10-CM

## 2020-12-30 DIAGNOSIS — Z01.419 WELL WOMAN EXAM WITH ROUTINE GYNECOLOGICAL EXAM: ICD-10-CM

## 2020-12-30 PROCEDURE — 77063 MAMMO DIGITAL SCREENING BILAT WITH TOMO: ICD-10-PCS | Mod: 26,,, | Performed by: RADIOLOGY

## 2020-12-30 PROCEDURE — 77063 BREAST TOMOSYNTHESIS BI: CPT | Mod: 26,,, | Performed by: RADIOLOGY

## 2020-12-30 PROCEDURE — 77067 SCR MAMMO BI INCL CAD: CPT | Mod: TC

## 2020-12-30 PROCEDURE — 77067 MAMMO DIGITAL SCREENING BILAT WITH TOMO: ICD-10-PCS | Mod: 26,,, | Performed by: RADIOLOGY

## 2020-12-30 PROCEDURE — 77067 SCR MAMMO BI INCL CAD: CPT | Mod: 26,,, | Performed by: RADIOLOGY

## 2021-01-04 ENCOUNTER — PATIENT MESSAGE (OUTPATIENT)
Dept: ADMINISTRATIVE | Facility: HOSPITAL | Age: 61
End: 2021-01-04

## 2021-01-04 ENCOUNTER — OFFICE VISIT (OUTPATIENT)
Dept: URGENT CARE | Facility: CLINIC | Age: 61
End: 2021-01-04
Payer: COMMERCIAL

## 2021-01-04 VITALS
WEIGHT: 225 LBS | OXYGEN SATURATION: 97 % | SYSTOLIC BLOOD PRESSURE: 146 MMHG | DIASTOLIC BLOOD PRESSURE: 76 MMHG | TEMPERATURE: 100 F | BODY MASS INDEX: 37.49 KG/M2 | RESPIRATION RATE: 19 BRPM | HEART RATE: 78 BPM | HEIGHT: 65 IN

## 2021-01-04 DIAGNOSIS — U07.1 COVID-19 VIRUS DETECTED: ICD-10-CM

## 2021-01-04 DIAGNOSIS — R52 GENERALIZED BODY ACHES: ICD-10-CM

## 2021-01-04 DIAGNOSIS — U07.1 COVID-19 VIRUS INFECTION: Primary | ICD-10-CM

## 2021-01-04 LAB
CTP QC/QA: YES
SARS-COV-2 RDRP RESP QL NAA+PROBE: POSITIVE

## 2021-01-04 PROCEDURE — U0002 COVID-19 LAB TEST NON-CDC: HCPCS | Mod: QW,S$GLB,, | Performed by: NURSE PRACTITIONER

## 2021-01-04 PROCEDURE — 99214 PR OFFICE/OUTPT VISIT, EST, LEVL IV, 30-39 MIN: ICD-10-PCS | Mod: S$GLB,,, | Performed by: NURSE PRACTITIONER

## 2021-01-04 PROCEDURE — U0002: ICD-10-PCS | Mod: QW,S$GLB,, | Performed by: NURSE PRACTITIONER

## 2021-01-04 PROCEDURE — 99214 OFFICE O/P EST MOD 30 MIN: CPT | Mod: S$GLB,,, | Performed by: NURSE PRACTITIONER

## 2021-01-26 ENCOUNTER — TELEPHONE (OUTPATIENT)
Dept: OPTOMETRY | Facility: CLINIC | Age: 61
End: 2021-01-26

## 2021-01-26 DIAGNOSIS — H40.053 OCULAR HYPERTENSION, BILATERAL: ICD-10-CM

## 2021-01-26 RX ORDER — TIMOLOL MALEATE 5 MG/ML
1 SOLUTION/ DROPS OPHTHALMIC 2 TIMES DAILY
Qty: 15 ML | Refills: 2 | Status: SHIPPED | OUTPATIENT
Start: 2021-01-26 | End: 2021-10-11 | Stop reason: SDUPTHER

## 2021-01-27 ENCOUNTER — HOSPITAL ENCOUNTER (OUTPATIENT)
Dept: RADIOLOGY | Facility: HOSPITAL | Age: 61
Discharge: HOME | End: 2021-01-27
Attending: INTERNAL MEDICINE
Payer: COMMERCIAL

## 2021-01-27 ENCOUNTER — OFFICE VISIT (OUTPATIENT)
Dept: INTERNAL MEDICINE | Facility: CLINIC | Age: 61
End: 2021-01-27
Payer: COMMERCIAL

## 2021-01-27 VITALS
WEIGHT: 212.94 LBS | BODY MASS INDEX: 35.48 KG/M2 | HEIGHT: 65 IN | DIASTOLIC BLOOD PRESSURE: 74 MMHG | HEART RATE: 78 BPM | SYSTOLIC BLOOD PRESSURE: 132 MMHG | TEMPERATURE: 98 F

## 2021-01-27 DIAGNOSIS — E66.2 OBESITY HYPOVENTILATION SYNDROME: ICD-10-CM

## 2021-01-27 DIAGNOSIS — R73.03 PRE-DIABETES: ICD-10-CM

## 2021-01-27 DIAGNOSIS — I10 ESSENTIAL HYPERTENSION: ICD-10-CM

## 2021-01-27 DIAGNOSIS — D86.0 SARCOIDOSIS OF LUNG: ICD-10-CM

## 2021-01-27 DIAGNOSIS — E66.01 SEVERE OBESITY (BMI 35.0-35.9 WITH COMORBIDITY): ICD-10-CM

## 2021-01-27 DIAGNOSIS — Z00.00 ANNUAL PHYSICAL EXAM: Primary | ICD-10-CM

## 2021-01-27 PROCEDURE — 90670 PCV13 VACCINE IM: CPT | Mod: S$GLB,,, | Performed by: INTERNAL MEDICINE

## 2021-01-27 PROCEDURE — 90471 IMMUNIZATION ADMIN: CPT | Mod: S$GLB,,, | Performed by: INTERNAL MEDICINE

## 2021-01-27 PROCEDURE — 99396 PREV VISIT EST AGE 40-64: CPT | Mod: 25,S$GLB,, | Performed by: INTERNAL MEDICINE

## 2021-01-27 PROCEDURE — 99999 PR PBB SHADOW E&M-EST. PATIENT-LVL III: ICD-10-PCS | Mod: PBBFAC,,, | Performed by: INTERNAL MEDICINE

## 2021-01-27 PROCEDURE — 71046 X-RAY EXAM CHEST 2 VIEWS: CPT | Mod: 26,,, | Performed by: RADIOLOGY

## 2021-01-27 PROCEDURE — 99999 PR PBB SHADOW E&M-EST. PATIENT-LVL III: CPT | Mod: PBBFAC,,, | Performed by: INTERNAL MEDICINE

## 2021-01-27 PROCEDURE — 99396 PR PREVENTIVE VISIT,EST,40-64: ICD-10-PCS | Mod: 25,S$GLB,, | Performed by: INTERNAL MEDICINE

## 2021-01-27 PROCEDURE — 71046 XR CHEST PA AND LATERAL: ICD-10-PCS | Mod: 26,,, | Performed by: RADIOLOGY

## 2021-01-27 PROCEDURE — 71046 X-RAY EXAM CHEST 2 VIEWS: CPT | Mod: TC,PO

## 2021-01-27 PROCEDURE — 90670 PNEUMOCOCCAL CONJUGATE VACCINE 13-VALENT LESS THAN 5YO & GREATER THAN: ICD-10-PCS | Mod: S$GLB,,, | Performed by: INTERNAL MEDICINE

## 2021-01-27 PROCEDURE — 90471 PNEUMOCOCCAL CONJUGATE VACCINE 13-VALENT LESS THAN 5YO & GREATER THAN: ICD-10-PCS | Mod: S$GLB,,, | Performed by: INTERNAL MEDICINE

## 2021-01-27 RX ORDER — HYDRALAZINE HYDROCHLORIDE 50 MG/1
50 TABLET, FILM COATED ORAL 3 TIMES DAILY
COMMUNITY
End: 2021-10-28 | Stop reason: SDUPTHER

## 2021-01-27 RX ORDER — ASCORBIC ACID 500 MG
500 TABLET ORAL DAILY
COMMUNITY
End: 2023-08-16

## 2021-01-28 ENCOUNTER — TELEPHONE (OUTPATIENT)
Dept: PULMONOLOGY | Facility: CLINIC | Age: 61
End: 2021-01-28

## 2021-01-28 ENCOUNTER — PATIENT OUTREACH (OUTPATIENT)
Dept: ADMINISTRATIVE | Facility: HOSPITAL | Age: 61
End: 2021-01-28

## 2021-02-03 ENCOUNTER — PATIENT OUTREACH (OUTPATIENT)
Dept: ADMINISTRATIVE | Facility: HOSPITAL | Age: 61
End: 2021-02-03

## 2021-02-04 ENCOUNTER — TELEPHONE (OUTPATIENT)
Dept: PULMONOLOGY | Facility: CLINIC | Age: 61
End: 2021-02-04

## 2021-02-25 ENCOUNTER — TELEPHONE (OUTPATIENT)
Dept: PULMONOLOGY | Facility: CLINIC | Age: 61
End: 2021-02-25

## 2021-03-08 ENCOUNTER — TELEPHONE (OUTPATIENT)
Dept: SLEEP MEDICINE | Facility: CLINIC | Age: 61
End: 2021-03-08

## 2021-03-29 ENCOUNTER — TELEPHONE (OUTPATIENT)
Dept: PULMONOLOGY | Facility: CLINIC | Age: 61
End: 2021-03-29

## 2021-04-05 DIAGNOSIS — E78.49 OTHER HYPERLIPIDEMIA: ICD-10-CM

## 2021-04-05 RX ORDER — ATORVASTATIN CALCIUM 40 MG/1
40 TABLET, FILM COATED ORAL NIGHTLY
Qty: 90 TABLET | Refills: 0 | Status: SHIPPED | OUTPATIENT
Start: 2021-04-05 | End: 2021-08-02 | Stop reason: SDUPTHER

## 2021-04-30 ENCOUNTER — PATIENT MESSAGE (OUTPATIENT)
Dept: PULMONOLOGY | Facility: CLINIC | Age: 61
End: 2021-04-30

## 2021-06-09 ENCOUNTER — TELEPHONE (OUTPATIENT)
Dept: INTERNAL MEDICINE | Facility: CLINIC | Age: 61
End: 2021-06-09

## 2021-06-09 DIAGNOSIS — I10 ESSENTIAL HYPERTENSION: Primary | ICD-10-CM

## 2021-06-09 DIAGNOSIS — R73.03 PRE-DIABETES: ICD-10-CM

## 2021-06-09 RX ORDER — NEBIVOLOL HYDROCHLORIDE 20 MG/1
TABLET ORAL
Qty: 90 TABLET | Refills: 1 | Status: SHIPPED | OUTPATIENT
Start: 2021-06-09 | End: 2021-10-28 | Stop reason: SDUPTHER

## 2021-06-14 ENCOUNTER — TELEPHONE (OUTPATIENT)
Dept: OPHTHALMOLOGY | Facility: CLINIC | Age: 61
End: 2021-06-14

## 2021-06-22 ENCOUNTER — PATIENT OUTREACH (OUTPATIENT)
Dept: ADMINISTRATIVE | Facility: OTHER | Age: 61
End: 2021-06-22

## 2021-06-23 ENCOUNTER — OFFICE VISIT (OUTPATIENT)
Dept: OPTOMETRY | Facility: CLINIC | Age: 61
End: 2021-06-23
Payer: COMMERCIAL

## 2021-06-23 ENCOUNTER — CLINICAL SUPPORT (OUTPATIENT)
Dept: OPHTHALMOLOGY | Facility: CLINIC | Age: 61
End: 2021-06-23
Payer: COMMERCIAL

## 2021-06-23 DIAGNOSIS — D86.9 SARCOIDOSIS: ICD-10-CM

## 2021-06-23 DIAGNOSIS — H40.1131 PRIMARY OPEN ANGLE GLAUCOMA (POAG) OF BOTH EYES, MILD STAGE: ICD-10-CM

## 2021-06-23 DIAGNOSIS — H40.053 OCULAR HYPERTENSION, BILATERAL: ICD-10-CM

## 2021-06-23 DIAGNOSIS — H40.053 OCULAR HYPERTENSION, BILATERAL: Primary | ICD-10-CM

## 2021-06-23 DIAGNOSIS — H40.1121 PRIMARY OPEN ANGLE GLAUCOMA (POAG) OF LEFT EYE, MILD STAGE: ICD-10-CM

## 2021-06-23 DIAGNOSIS — H25.13 NUCLEAR SCLEROSIS OF BOTH EYES: ICD-10-CM

## 2021-06-23 PROCEDURE — 92012 INTRM OPH EXAM EST PATIENT: CPT | Mod: S$GLB,,, | Performed by: OPTOMETRIST

## 2021-06-23 PROCEDURE — 99999 PR PBB SHADOW E&M-EST. PATIENT-LVL III: ICD-10-PCS | Mod: PBBFAC,,, | Performed by: OPTOMETRIST

## 2021-06-23 PROCEDURE — 92012 PR EYE EXAM, EST PATIENT,INTERMED: ICD-10-PCS | Mod: S$GLB,,, | Performed by: OPTOMETRIST

## 2021-06-23 PROCEDURE — 99999 PR PBB SHADOW E&M-EST. PATIENT-LVL III: CPT | Mod: PBBFAC,,, | Performed by: OPTOMETRIST

## 2021-06-25 ENCOUNTER — TELEPHONE (OUTPATIENT)
Dept: PULMONOLOGY | Facility: CLINIC | Age: 61
End: 2021-06-25

## 2021-06-25 DIAGNOSIS — D86.9 SARCOIDOSIS: Primary | ICD-10-CM

## 2021-06-30 ENCOUNTER — HOSPITAL ENCOUNTER (OUTPATIENT)
Dept: PULMONOLOGY | Facility: CLINIC | Age: 61
Discharge: HOME | End: 2021-06-30
Payer: COMMERCIAL

## 2021-06-30 ENCOUNTER — OFFICE VISIT (OUTPATIENT)
Dept: PULMONOLOGY | Facility: CLINIC | Age: 61
End: 2021-06-30
Payer: COMMERCIAL

## 2021-06-30 VITALS
WEIGHT: 221 LBS | WEIGHT: 221 LBS | HEART RATE: 69 BPM | OXYGEN SATURATION: 97 % | BODY MASS INDEX: 36.82 KG/M2 | SYSTOLIC BLOOD PRESSURE: 162 MMHG | HEIGHT: 65 IN | HEIGHT: 65 IN | DIASTOLIC BLOOD PRESSURE: 85 MMHG | BODY MASS INDEX: 36.82 KG/M2

## 2021-06-30 DIAGNOSIS — D86.9 SARCOIDOSIS: ICD-10-CM

## 2021-06-30 DIAGNOSIS — I10 HYPERTENSION, UNSPECIFIED TYPE: ICD-10-CM

## 2021-06-30 DIAGNOSIS — Q27.30 AVM (ARTERIOVENOUS MALFORMATION): ICD-10-CM

## 2021-06-30 DIAGNOSIS — D86.9 SARCOID: Primary | ICD-10-CM

## 2021-06-30 PROCEDURE — 99214 OFFICE O/P EST MOD 30 MIN: CPT | Mod: S$GLB,,, | Performed by: EMERGENCY MEDICINE

## 2021-06-30 PROCEDURE — 94729 DIFFUSING CAPACITY: CPT | Mod: S$GLB,,, | Performed by: INTERNAL MEDICINE

## 2021-06-30 PROCEDURE — 94060 EVALUATION OF WHEEZING: CPT | Mod: S$GLB,,, | Performed by: INTERNAL MEDICINE

## 2021-06-30 PROCEDURE — 94060 PR EVAL OF BRONCHOSPASM: ICD-10-PCS | Mod: S$GLB,,, | Performed by: INTERNAL MEDICINE

## 2021-06-30 PROCEDURE — 94729 PR C02/MEMBANE DIFFUSE CAPACITY: ICD-10-PCS | Mod: S$GLB,,, | Performed by: INTERNAL MEDICINE

## 2021-06-30 PROCEDURE — 99214 PR OFFICE/OUTPT VISIT, EST, LEVL IV, 30-39 MIN: ICD-10-PCS | Mod: S$GLB,,, | Performed by: EMERGENCY MEDICINE

## 2021-06-30 PROCEDURE — 99999 PR PBB SHADOW E&M-EST. PATIENT-LVL III: CPT | Mod: PBBFAC,,, | Performed by: EMERGENCY MEDICINE

## 2021-06-30 PROCEDURE — 94727 GAS DIL/WSHOT DETER LNG VOL: CPT | Mod: S$GLB,,, | Performed by: INTERNAL MEDICINE

## 2021-06-30 PROCEDURE — 94727 PR PULM FUNCTION TEST BY GAS: ICD-10-PCS | Mod: S$GLB,,, | Performed by: INTERNAL MEDICINE

## 2021-06-30 PROCEDURE — 94618 PULMONARY STRESS TESTING: CPT | Mod: S$GLB,,, | Performed by: INTERNAL MEDICINE

## 2021-06-30 PROCEDURE — 99999 PR PBB SHADOW E&M-EST. PATIENT-LVL III: ICD-10-PCS | Mod: PBBFAC,,, | Performed by: EMERGENCY MEDICINE

## 2021-06-30 PROCEDURE — 94618 PULMONARY STRESS TESTING: ICD-10-PCS | Mod: S$GLB,,, | Performed by: INTERNAL MEDICINE

## 2021-07-06 ENCOUNTER — TELEPHONE (OUTPATIENT)
Dept: INTERNAL MEDICINE | Facility: CLINIC | Age: 61
End: 2021-07-06

## 2021-08-02 ENCOUNTER — TELEPHONE (OUTPATIENT)
Dept: INTERNAL MEDICINE | Facility: CLINIC | Age: 61
End: 2021-08-02

## 2021-08-02 DIAGNOSIS — E78.49 OTHER HYPERLIPIDEMIA: ICD-10-CM

## 2021-08-02 RX ORDER — ATORVASTATIN CALCIUM 40 MG/1
40 TABLET, FILM COATED ORAL NIGHTLY
Qty: 90 TABLET | Refills: 3 | Status: SHIPPED | OUTPATIENT
Start: 2021-08-02 | End: 2022-08-23

## 2021-08-16 ENCOUNTER — LAB VISIT (OUTPATIENT)
Dept: LAB | Facility: HOSPITAL | Age: 61
End: 2021-08-16
Attending: INTERNAL MEDICINE
Payer: COMMERCIAL

## 2021-08-16 DIAGNOSIS — I10 ESSENTIAL HYPERTENSION: ICD-10-CM

## 2021-08-16 DIAGNOSIS — R73.03 PRE-DIABETES: ICD-10-CM

## 2021-08-16 LAB
ALBUMIN SERPL BCP-MCNC: 3.7 G/DL (ref 3.5–5.2)
ALP SERPL-CCNC: 75 U/L (ref 55–135)
ALT SERPL W/O P-5'-P-CCNC: 21 U/L (ref 10–44)
ANION GAP SERPL CALC-SCNC: 7 MMOL/L (ref 8–16)
AST SERPL-CCNC: 18 U/L (ref 10–40)
BASOPHILS # BLD AUTO: 0.05 K/UL (ref 0–0.2)
BASOPHILS NFR BLD: 0.9 % (ref 0–1.9)
BILIRUB SERPL-MCNC: 0.5 MG/DL (ref 0.1–1)
BUN SERPL-MCNC: 12 MG/DL (ref 6–20)
CALCIUM SERPL-MCNC: 9.6 MG/DL (ref 8.7–10.5)
CHLORIDE SERPL-SCNC: 106 MMOL/L (ref 95–110)
CHOLEST SERPL-MCNC: 139 MG/DL (ref 120–199)
CHOLEST/HDLC SERPL: 3.8 {RATIO} (ref 2–5)
CO2 SERPL-SCNC: 29 MMOL/L (ref 23–29)
CREAT SERPL-MCNC: 0.8 MG/DL (ref 0.5–1.4)
DIFFERENTIAL METHOD: ABNORMAL
EOSINOPHIL # BLD AUTO: 0.2 K/UL (ref 0–0.5)
EOSINOPHIL NFR BLD: 3.9 % (ref 0–8)
ERYTHROCYTE [DISTWIDTH] IN BLOOD BY AUTOMATED COUNT: 15.1 % (ref 11.5–14.5)
EST. GFR  (AFRICAN AMERICAN): >60 ML/MIN/1.73 M^2
EST. GFR  (NON AFRICAN AMERICAN): >60 ML/MIN/1.73 M^2
GLUCOSE SERPL-MCNC: 126 MG/DL (ref 70–110)
HCT VFR BLD AUTO: 40.4 % (ref 37–48.5)
HDLC SERPL-MCNC: 37 MG/DL (ref 40–75)
HDLC SERPL: 26.6 % (ref 20–50)
HGB BLD-MCNC: 12.6 G/DL (ref 12–16)
IMM GRANULOCYTES # BLD AUTO: 0.02 K/UL (ref 0–0.04)
IMM GRANULOCYTES NFR BLD AUTO: 0.4 % (ref 0–0.5)
LDLC SERPL CALC-MCNC: 80.8 MG/DL (ref 63–159)
LYMPHOCYTES # BLD AUTO: 1.8 K/UL (ref 1–4.8)
LYMPHOCYTES NFR BLD: 33.9 % (ref 18–48)
MCH RBC QN AUTO: 28.4 PG (ref 27–31)
MCHC RBC AUTO-ENTMCNC: 31.2 G/DL (ref 32–36)
MCV RBC AUTO: 91 FL (ref 82–98)
MONOCYTES # BLD AUTO: 0.5 K/UL (ref 0.3–1)
MONOCYTES NFR BLD: 9.2 % (ref 4–15)
NEUTROPHILS # BLD AUTO: 2.8 K/UL (ref 1.8–7.7)
NEUTROPHILS NFR BLD: 51.7 % (ref 38–73)
NONHDLC SERPL-MCNC: 102 MG/DL
NRBC BLD-RTO: 0 /100 WBC
PLATELET # BLD AUTO: 273 K/UL (ref 150–450)
PMV BLD AUTO: 10.8 FL (ref 9.2–12.9)
POTASSIUM SERPL-SCNC: 4.4 MMOL/L (ref 3.5–5.1)
PROT SERPL-MCNC: 7 G/DL (ref 6–8.4)
RBC # BLD AUTO: 4.43 M/UL (ref 4–5.4)
SODIUM SERPL-SCNC: 142 MMOL/L (ref 136–145)
TRIGL SERPL-MCNC: 106 MG/DL (ref 30–150)
WBC # BLD AUTO: 5.42 K/UL (ref 3.9–12.7)

## 2021-08-16 PROCEDURE — 85025 COMPLETE CBC W/AUTO DIFF WBC: CPT | Performed by: INTERNAL MEDICINE

## 2021-08-16 PROCEDURE — 80053 COMPREHEN METABOLIC PANEL: CPT | Performed by: INTERNAL MEDICINE

## 2021-08-16 PROCEDURE — 36415 COLL VENOUS BLD VENIPUNCTURE: CPT | Mod: PO | Performed by: INTERNAL MEDICINE

## 2021-08-16 PROCEDURE — 80061 LIPID PANEL: CPT | Performed by: INTERNAL MEDICINE

## 2021-08-25 ENCOUNTER — LAB VISIT (OUTPATIENT)
Dept: LAB | Facility: HOSPITAL | Age: 61
End: 2021-08-25
Attending: INTERNAL MEDICINE
Payer: COMMERCIAL

## 2021-08-25 ENCOUNTER — OFFICE VISIT (OUTPATIENT)
Dept: INTERNAL MEDICINE | Facility: CLINIC | Age: 61
End: 2021-08-25
Payer: COMMERCIAL

## 2021-08-25 VITALS
TEMPERATURE: 98 F | HEIGHT: 65 IN | SYSTOLIC BLOOD PRESSURE: 136 MMHG | HEART RATE: 84 BPM | RESPIRATION RATE: 16 BRPM | WEIGHT: 219.56 LBS | BODY MASS INDEX: 36.58 KG/M2 | DIASTOLIC BLOOD PRESSURE: 72 MMHG

## 2021-08-25 DIAGNOSIS — R73.03 PRE-DIABETES: ICD-10-CM

## 2021-08-25 DIAGNOSIS — G47.33 OSA (OBSTRUCTIVE SLEEP APNEA): ICD-10-CM

## 2021-08-25 DIAGNOSIS — Z00.00 ANNUAL PHYSICAL EXAM: ICD-10-CM

## 2021-08-25 DIAGNOSIS — D86.0 SARCOIDOSIS OF LUNG: ICD-10-CM

## 2021-08-25 DIAGNOSIS — E78.5 HYPERLIPIDEMIA, UNSPECIFIED HYPERLIPIDEMIA TYPE: ICD-10-CM

## 2021-08-25 DIAGNOSIS — E66.01 SEVERE OBESITY (BMI 35.0-35.9 WITH COMORBIDITY): ICD-10-CM

## 2021-08-25 DIAGNOSIS — I10 ESSENTIAL HYPERTENSION: Primary | ICD-10-CM

## 2021-08-25 LAB
ESTIMATED AVG GLUCOSE: 123 MG/DL (ref 68–131)
HBA1C MFR BLD: 5.9 % (ref 4–5.6)

## 2021-08-25 PROCEDURE — 99214 PR OFFICE/OUTPT VISIT, EST, LEVL IV, 30-39 MIN: ICD-10-PCS | Mod: S$GLB,,, | Performed by: INTERNAL MEDICINE

## 2021-08-25 PROCEDURE — 83036 HEMOGLOBIN GLYCOSYLATED A1C: CPT | Performed by: INTERNAL MEDICINE

## 2021-08-25 PROCEDURE — 99999 PR PBB SHADOW E&M-EST. PATIENT-LVL III: ICD-10-PCS | Mod: PBBFAC,,, | Performed by: INTERNAL MEDICINE

## 2021-08-25 PROCEDURE — 99999 PR PBB SHADOW E&M-EST. PATIENT-LVL III: CPT | Mod: PBBFAC,,, | Performed by: INTERNAL MEDICINE

## 2021-08-25 PROCEDURE — 99214 OFFICE O/P EST MOD 30 MIN: CPT | Mod: S$GLB,,, | Performed by: INTERNAL MEDICINE

## 2021-08-25 PROCEDURE — 36415 COLL VENOUS BLD VENIPUNCTURE: CPT | Mod: PO | Performed by: INTERNAL MEDICINE

## 2021-09-27 ENCOUNTER — TELEPHONE (OUTPATIENT)
Dept: OBSTETRICS AND GYNECOLOGY | Facility: CLINIC | Age: 61
End: 2021-09-27

## 2021-10-04 RX ORDER — IRBESARTAN AND HYDROCHLOROTHIAZIDE 300; 12.5 MG/1; MG/1
TABLET, FILM COATED ORAL
Qty: 90 TABLET | Refills: 3 | Status: SHIPPED | OUTPATIENT
Start: 2021-10-04 | End: 2022-10-13 | Stop reason: SDUPTHER

## 2021-10-11 ENCOUNTER — TELEPHONE (OUTPATIENT)
Dept: OPHTHALMOLOGY | Facility: CLINIC | Age: 61
End: 2021-10-11

## 2021-10-11 DIAGNOSIS — H40.053 OCULAR HYPERTENSION, BILATERAL: ICD-10-CM

## 2021-10-11 RX ORDER — TIMOLOL MALEATE 5 MG/ML
1 SOLUTION/ DROPS OPHTHALMIC 2 TIMES DAILY
Qty: 15 ML | Refills: 2 | Status: SHIPPED | OUTPATIENT
Start: 2021-10-11 | End: 2022-08-04 | Stop reason: SDUPTHER

## 2021-10-22 ENCOUNTER — IMMUNIZATION (OUTPATIENT)
Dept: PRIMARY CARE CLINIC | Facility: CLINIC | Age: 61
End: 2021-10-22
Payer: COMMERCIAL

## 2021-10-22 DIAGNOSIS — Z23 NEED FOR VACCINATION: Primary | ICD-10-CM

## 2021-10-22 PROCEDURE — 91300 COVID-19, MRNA, LNP-S, PF, 30 MCG/0.3 ML DOSE VACCINE: CPT | Mod: PBBFAC | Performed by: INTERNAL MEDICINE

## 2021-10-22 PROCEDURE — 0003A COVID-19, MRNA, LNP-S, PF, 30 MCG/0.3 ML DOSE VACCINE: CPT | Mod: CV19,PBBFAC | Performed by: INTERNAL MEDICINE

## 2021-10-28 RX ORDER — NEBIVOLOL HYDROCHLORIDE 20 MG/1
TABLET ORAL
Qty: 90 TABLET | Refills: 3 | Status: SHIPPED | OUTPATIENT
Start: 2021-10-28 | End: 2021-11-08

## 2021-10-28 RX ORDER — HYDRALAZINE HYDROCHLORIDE 50 MG/1
50 TABLET, FILM COATED ORAL 3 TIMES DAILY
Qty: 270 TABLET | Refills: 3 | Status: SHIPPED | OUTPATIENT
Start: 2021-10-28 | End: 2022-11-16

## 2021-11-01 ENCOUNTER — TELEPHONE (OUTPATIENT)
Dept: INTERNAL MEDICINE | Facility: CLINIC | Age: 61
End: 2021-11-01
Payer: COMMERCIAL

## 2021-11-01 RX ORDER — NEBIVOLOL 20 MG/1
20 TABLET ORAL DAILY
Qty: 90 TABLET | Refills: 0 | Status: SHIPPED | OUTPATIENT
Start: 2021-11-01 | End: 2021-11-08

## 2021-11-02 ENCOUNTER — TELEPHONE (OUTPATIENT)
Dept: SLEEP MEDICINE | Facility: CLINIC | Age: 61
End: 2021-11-02
Payer: COMMERCIAL

## 2021-11-08 ENCOUNTER — TELEPHONE (OUTPATIENT)
Dept: INTERNAL MEDICINE | Facility: CLINIC | Age: 61
End: 2021-11-08
Payer: COMMERCIAL

## 2021-11-08 RX ORDER — ATENOLOL 100 MG/1
100 TABLET ORAL DAILY
Qty: 90 TABLET | Refills: 3 | Status: SHIPPED | OUTPATIENT
Start: 2021-11-08 | End: 2022-10-13 | Stop reason: SDUPTHER

## 2021-11-09 ENCOUNTER — OFFICE VISIT (OUTPATIENT)
Dept: SLEEP MEDICINE | Facility: CLINIC | Age: 61
End: 2021-11-09
Payer: COMMERCIAL

## 2021-11-09 VITALS
DIASTOLIC BLOOD PRESSURE: 78 MMHG | HEIGHT: 65 IN | BODY MASS INDEX: 36.48 KG/M2 | WEIGHT: 218.94 LBS | SYSTOLIC BLOOD PRESSURE: 165 MMHG

## 2021-11-09 DIAGNOSIS — I10 ESSENTIAL HYPERTENSION: ICD-10-CM

## 2021-11-09 DIAGNOSIS — G47.33 OSA (OBSTRUCTIVE SLEEP APNEA): Primary | ICD-10-CM

## 2021-11-09 DIAGNOSIS — J98.4 CRLD (CHRONIC RESTRICTIVE LUNG DISEASE): ICD-10-CM

## 2021-11-09 DIAGNOSIS — D86.0 SARCOIDOSIS OF LUNG: ICD-10-CM

## 2021-11-09 PROCEDURE — 99999 PR PBB SHADOW E&M-EST. PATIENT-LVL II: ICD-10-PCS | Mod: PBBFAC,,, | Performed by: NURSE PRACTITIONER

## 2021-11-09 PROCEDURE — 99214 OFFICE O/P EST MOD 30 MIN: CPT | Mod: S$GLB,,, | Performed by: NURSE PRACTITIONER

## 2021-11-09 PROCEDURE — 99214 PR OFFICE/OUTPT VISIT, EST, LEVL IV, 30-39 MIN: ICD-10-PCS | Mod: S$GLB,,, | Performed by: NURSE PRACTITIONER

## 2021-11-09 PROCEDURE — 99999 PR PBB SHADOW E&M-EST. PATIENT-LVL II: CPT | Mod: PBBFAC,,, | Performed by: NURSE PRACTITIONER

## 2022-01-24 ENCOUNTER — PATIENT OUTREACH (OUTPATIENT)
Dept: ADMINISTRATIVE | Facility: OTHER | Age: 62
End: 2022-01-24
Payer: COMMERCIAL

## 2022-01-24 ENCOUNTER — PATIENT MESSAGE (OUTPATIENT)
Dept: ADMINISTRATIVE | Facility: OTHER | Age: 62
End: 2022-01-24
Payer: COMMERCIAL

## 2022-01-24 DIAGNOSIS — Z12.31 ENCOUNTER FOR SCREENING MAMMOGRAM FOR MALIGNANT NEOPLASM OF BREAST: Primary | ICD-10-CM

## 2022-01-24 NOTE — PROGRESS NOTES
Care Everywhere: updated  Immunization: updated  Health Maintenance: updated   Media Review: review for outside mammogram and colon cancer report   Legacy Review:   DIS:  Order placed: mammogram   Upcoming appts:  EFAX: sent to Dr. Urias office to possibly obtain updated colon cancer report   Task Tickets:Mammogram scheduling ticket sent to patient's portal   Referrals:

## 2022-01-24 NOTE — LETTER
AUTHORIZATION FOR RELEASE OF   CONFIDENTIAL INFORMATION    Dear Tyra Urias MD,    We are seeing Cydney Salazar, date of birth 1960, in the clinic at [facility]. Jasiel Middleton DO is the patient's PCP. Cydney Salazar has an outstanding lab/procedure at the time we reviewed her chart. In order to help keep her health information updated, she has authorized us to request the following medical record(s):        (  )  MAMMOGRAM                                      ( x )  COLONOSCOPY after       (  )  PAP SMEAR                                          (  )  OUTSIDE LAB RESULTS     (  )  DEXA SCAN                                          (  )  EYE EXAM            (  )  FOOT EXAM                                          (  )  ENTIRE RECORD     (  )  OUTSIDE IMMUNIZATIONS                 (  )  _______________         Please fax records to [facility], Brian M Helmstetter, DO, [phone]     If you have any questions, please contact Rachele Gonzalez at [phone].           Patient Name: Cydney aSlazar  : 1960  Patient Phone #: [phone]

## 2022-01-26 ENCOUNTER — OFFICE VISIT (OUTPATIENT)
Dept: OBSTETRICS AND GYNECOLOGY | Facility: CLINIC | Age: 62
End: 2022-01-26
Attending: OBSTETRICS & GYNECOLOGY
Payer: COMMERCIAL

## 2022-01-26 VITALS
DIASTOLIC BLOOD PRESSURE: 90 MMHG | BODY MASS INDEX: 36.48 KG/M2 | HEIGHT: 65 IN | SYSTOLIC BLOOD PRESSURE: 150 MMHG | WEIGHT: 218.94 LBS

## 2022-01-26 DIAGNOSIS — Z01.419 WELL WOMAN EXAM: Primary | ICD-10-CM

## 2022-01-26 PROCEDURE — 3008F PR BODY MASS INDEX (BMI) DOCUMENTED: ICD-10-PCS | Mod: CPTII,S$GLB,, | Performed by: OBSTETRICS & GYNECOLOGY

## 2022-01-26 PROCEDURE — 1160F PR REVIEW ALL MEDS BY PRESCRIBER/CLIN PHARMACIST DOCUMENTED: ICD-10-PCS | Mod: CPTII,S$GLB,, | Performed by: OBSTETRICS & GYNECOLOGY

## 2022-01-26 PROCEDURE — 1159F MED LIST DOCD IN RCRD: CPT | Mod: CPTII,S$GLB,, | Performed by: OBSTETRICS & GYNECOLOGY

## 2022-01-26 PROCEDURE — 99396 PR PREVENTIVE VISIT,EST,40-64: ICD-10-PCS | Mod: S$GLB,,, | Performed by: OBSTETRICS & GYNECOLOGY

## 2022-01-26 PROCEDURE — 1160F RVW MEDS BY RX/DR IN RCRD: CPT | Mod: CPTII,S$GLB,, | Performed by: OBSTETRICS & GYNECOLOGY

## 2022-01-26 PROCEDURE — 3008F BODY MASS INDEX DOCD: CPT | Mod: CPTII,S$GLB,, | Performed by: OBSTETRICS & GYNECOLOGY

## 2022-01-26 PROCEDURE — 1159F PR MEDICATION LIST DOCUMENTED IN MEDICAL RECORD: ICD-10-PCS | Mod: CPTII,S$GLB,, | Performed by: OBSTETRICS & GYNECOLOGY

## 2022-01-26 PROCEDURE — 99999 PR PBB SHADOW E&M-EST. PATIENT-LVL III: ICD-10-PCS | Mod: PBBFAC,,, | Performed by: OBSTETRICS & GYNECOLOGY

## 2022-01-26 PROCEDURE — 3077F SYST BP >= 140 MM HG: CPT | Mod: CPTII,S$GLB,, | Performed by: OBSTETRICS & GYNECOLOGY

## 2022-01-26 PROCEDURE — 3080F PR MOST RECENT DIASTOLIC BLOOD PRESSURE >= 90 MM HG: ICD-10-PCS | Mod: CPTII,S$GLB,, | Performed by: OBSTETRICS & GYNECOLOGY

## 2022-01-26 PROCEDURE — 99999 PR PBB SHADOW E&M-EST. PATIENT-LVL III: CPT | Mod: PBBFAC,,, | Performed by: OBSTETRICS & GYNECOLOGY

## 2022-01-26 PROCEDURE — 3080F DIAST BP >= 90 MM HG: CPT | Mod: CPTII,S$GLB,, | Performed by: OBSTETRICS & GYNECOLOGY

## 2022-01-26 PROCEDURE — 87624 HPV HI-RISK TYP POOLED RSLT: CPT | Performed by: OBSTETRICS & GYNECOLOGY

## 2022-01-26 PROCEDURE — 3077F PR MOST RECENT SYSTOLIC BLOOD PRESSURE >= 140 MM HG: ICD-10-PCS | Mod: CPTII,S$GLB,, | Performed by: OBSTETRICS & GYNECOLOGY

## 2022-01-26 PROCEDURE — 99396 PREV VISIT EST AGE 40-64: CPT | Mod: S$GLB,,, | Performed by: OBSTETRICS & GYNECOLOGY

## 2022-01-26 NOTE — PROGRESS NOTES
CC: Well woman exam    Cydney Salazar is a 62 y.o. female  presents for well woman exam.  LMP: No LMP recorded (lmp unknown). Patient is postmenopausal..  No gyn issues, problems, or complaints.       age 49.  Occasionally still has hot flashes.    Last pap a few years ago.  No history of abnormal pap.    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    Cataract     Colon polyps 2013    Glaucoma     Hypertension     Obesity     Prediabetes     Sarcoidosis     in remission    Sleep apnea     uses cpap     Past Surgical History:   Procedure Laterality Date     SECTION      EMBOLIZATION N/A 2018    Procedure: Embolization;  Surgeon: Whit Surgeon;  Location: [facility];  Service: Radiology;  Laterality: N/A;    EMBOLIZATION N/A 3/27/2019    Procedure: EMBOLIZATION, BLOOD VESSEL-AVM;  Surgeon: Whit Surgeon;  Location: [facility];  Service: Anesthesiology;  Laterality: N/A;  Room 189/[facility]    ir embolization of pulmonary AVM       Social History     Socioeconomic History    Marital status:    Tobacco Use    Smoking status: Never Smoker    Smokeless tobacco: Never Used    Tobacco comment:  smokes, usually not in the home   Substance and Sexual Activity    Alcohol use: Yes     Comment: rarely    Drug use: No    Sexual activity: Yes     Partners: Male     Birth control/protection: Post-menopausal, None   Social History Narrative    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     Family History   Problem Relation Age of Onset    Glaucoma Mother     Hyperlipidemia Mother     Diabetes Daughter     Stroke Father     Diabetes Father     Hyperlipidemia Father     No Known Problems Sister     No Known Problems Brother     No Known Problems Maternal Aunt     No Known Problems Maternal Uncle     No Known Problems Paternal Aunt     No Known Problems Paternal Uncle     No Known Problems Maternal Grandmother   "   No Known Problems Maternal Grandfather     No Known Problems Paternal Grandmother     No Known Problems Paternal Grandfather     Amblyopia Neg Hx     Blindness Neg Hx     Cancer Neg Hx     Cataracts Neg Hx     Hypertension Neg Hx     Macular degeneration Neg Hx     Retinal detachment Neg Hx     Strabismus Neg Hx     Thyroid disease Neg Hx     Breast cancer Neg Hx     Colon cancer Neg Hx     Ovarian cancer Neg Hx      OB History        1    Para   1    Term   0       0    AB   0    Living   1       SAB   0    IAB   0    Ectopic   0    Multiple   0    Live Births   1                 BP (!) 150/90   Ht 5' 5" (1.651 m)   Wt 99.3 kg (218 lb 14.7 oz)   LMP  (LMP Unknown)   BMI 36.43 kg/m²       ROS:  GENERAL: Denies weight gain or weight loss. Feeling well overall.   SKIN: Denies rash or lesions.   HEAD: Denies head injury or headache.   NODES: Denies enlarged lymph nodes.   CHEST: Denies chest pain or shortness of breath.   CARDIOVASCULAR: Denies palpitations or left sided chest pain.   ABDOMEN: No abdominal pain, constipation, diarrhea, nausea, vomiting or rectal bleeding.   URINARY: No frequency, dysuria, hematuria, or burning on urination.  REPRODUCTIVE: See HPI.   BREASTS: The patient performs breast self-examination and denies pain, lumps, or nipple discharge.   HEMATOLOGIC: No easy bruisability or excessive bleeding.   MUSCULOSKELETAL: Denies joint pain or swelling.   NEUROLOGIC: Denies syncope or weakness.   PSYCHIATRIC: Denies depression, anxiety or mood swings.    PHYSICAL EXAM:  APPEARANCE: Well nourished, well developed, in no acute distress.  AFFECT: WNL, alert and oriented x 3  SKIN: No acne or hirsutism  NECK: Neck symmetric without masses or thyromegaly  NODES: No inguinal, cervical, axillary, or femoral lymph node enlargement  CHEST: Good respiratory effect  ABDOMEN: Soft.  No tenderness or masses.  No hepatosplenomegaly.  No hernias.  BREASTS: Symmetrical, no skin " changes or visible lesions.  No palpable masses, nipple discharge bilaterally.  PELVIC: Normal external genitalia without lesions.  Normal hair distribution.  Adequate perineal body, normal urethral meatus.  Vagina moist and well rugated without lesions or discharge.  Cervix pink, without lesions, discharge or tenderness.  No significant cystocele or rectocele.  Bimanual exam shows uterus to be normal size, regular, mobile and nontender.  Adnexa without masses or tenderness.    EXTREMITIES: No edema.      ASSESSMENT & PLAN    ICD-10-CM ICD-9-CM    1. Well woman exam  Z01.419 V72.31 CANCELED: Liquid-Based Pap Smear, Screening      CANCELED: HPV High Risk Genotypes, PCR          Patient was counseled today on A.C.S. Pap guidelines and recommendations for yearly pelvic exams, mammograms and monthly self breast exams; to see her PCP for other health maintenance.

## 2022-01-30 LAB
CYTOLOGIST CVX/VAG CYTO: NORMAL
CYTOLOGY CVX/VAG DOC CYTO: NORMAL
CYTOLOGY CVX/VAG DOC THIN PREP: NORMAL
CYTOLOGY THINPREP PAP COMMENT: NORMAL
HPV HR 12 DNA CVX QL NAA+PROBE: NEGATIVE
HPV16 DNA CVX QL NAA+PROBE: NEGATIVE
HPV18 DNA CVX QL NAA+PROBE: NEGATIVE
PAP NOTE: NORMAL
STAT OF ADQ CVX/VAG CYTO-IMP: NORMAL

## 2022-02-10 ENCOUNTER — PATIENT OUTREACH (OUTPATIENT)
Dept: ADMINISTRATIVE | Facility: OTHER | Age: 62
End: 2022-02-10
Payer: COMMERCIAL

## 2022-02-18 ENCOUNTER — LAB VISIT (OUTPATIENT)
Dept: LAB | Facility: HOSPITAL | Age: 62
End: 2022-02-18
Attending: INTERNAL MEDICINE
Payer: COMMERCIAL

## 2022-02-18 DIAGNOSIS — R73.03 PRE-DIABETES: ICD-10-CM

## 2022-02-18 DIAGNOSIS — E78.5 HYPERLIPIDEMIA, UNSPECIFIED HYPERLIPIDEMIA TYPE: ICD-10-CM

## 2022-02-18 DIAGNOSIS — I10 ESSENTIAL HYPERTENSION: ICD-10-CM

## 2022-02-18 DIAGNOSIS — Z00.00 ANNUAL PHYSICAL EXAM: ICD-10-CM

## 2022-02-18 LAB
ALBUMIN SERPL BCP-MCNC: 3.6 G/DL (ref 3.5–5.2)
ALP SERPL-CCNC: 77 U/L (ref 55–135)
ALT SERPL W/O P-5'-P-CCNC: 31 U/L (ref 10–44)
ANION GAP SERPL CALC-SCNC: 10 MMOL/L (ref 8–16)
AST SERPL-CCNC: 24 U/L (ref 10–40)
BASOPHILS # BLD AUTO: 0.03 K/UL (ref 0–0.2)
BASOPHILS NFR BLD: 0.5 % (ref 0–1.9)
BILIRUB SERPL-MCNC: 0.2 MG/DL (ref 0.1–1)
BUN SERPL-MCNC: 18 MG/DL (ref 6–20)
CALCIUM SERPL-MCNC: 9.8 MG/DL (ref 8.7–10.5)
CHLORIDE SERPL-SCNC: 105 MMOL/L (ref 95–110)
CHOLEST SERPL-MCNC: 159 MG/DL (ref 120–199)
CHOLEST/HDLC SERPL: 3.7 {RATIO} (ref 2–5)
CO2 SERPL-SCNC: 30 MMOL/L (ref 23–29)
CREAT SERPL-MCNC: 0.9 MG/DL (ref 0.5–1.4)
DIFFERENTIAL METHOD: ABNORMAL
EOSINOPHIL # BLD AUTO: 0.2 K/UL (ref 0–0.5)
EOSINOPHIL NFR BLD: 4.3 % (ref 0–8)
ERYTHROCYTE [DISTWIDTH] IN BLOOD BY AUTOMATED COUNT: 15.3 % (ref 11.5–14.5)
EST. GFR  (AFRICAN AMERICAN): >60 ML/MIN/1.73 M^2
EST. GFR  (NON AFRICAN AMERICAN): >60 ML/MIN/1.73 M^2
ESTIMATED AVG GLUCOSE: 126 MG/DL (ref 68–131)
GLUCOSE SERPL-MCNC: 122 MG/DL (ref 70–110)
HBA1C MFR BLD: 6 % (ref 4–5.6)
HCT VFR BLD AUTO: 39.3 % (ref 37–48.5)
HDLC SERPL-MCNC: 43 MG/DL (ref 40–75)
HDLC SERPL: 27 % (ref 20–50)
HGB BLD-MCNC: 12.1 G/DL (ref 12–16)
IMM GRANULOCYTES # BLD AUTO: 0.02 K/UL (ref 0–0.04)
IMM GRANULOCYTES NFR BLD AUTO: 0.4 % (ref 0–0.5)
LDLC SERPL CALC-MCNC: 86.2 MG/DL (ref 63–159)
LYMPHOCYTES # BLD AUTO: 1.6 K/UL (ref 1–4.8)
LYMPHOCYTES NFR BLD: 27.7 % (ref 18–48)
MCH RBC QN AUTO: 28.7 PG (ref 27–31)
MCHC RBC AUTO-ENTMCNC: 30.8 G/DL (ref 32–36)
MCV RBC AUTO: 93 FL (ref 82–98)
MONOCYTES # BLD AUTO: 0.5 K/UL (ref 0.3–1)
MONOCYTES NFR BLD: 8.8 % (ref 4–15)
NEUTROPHILS # BLD AUTO: 3.3 K/UL (ref 1.8–7.7)
NEUTROPHILS NFR BLD: 58.3 % (ref 38–73)
NONHDLC SERPL-MCNC: 116 MG/DL
NRBC BLD-RTO: 0 /100 WBC
PLATELET # BLD AUTO: 247 K/UL (ref 150–450)
PMV BLD AUTO: 10.6 FL (ref 9.2–12.9)
POTASSIUM SERPL-SCNC: 3.9 MMOL/L (ref 3.5–5.1)
PROT SERPL-MCNC: 6.9 G/DL (ref 6–8.4)
RBC # BLD AUTO: 4.22 M/UL (ref 4–5.4)
SODIUM SERPL-SCNC: 145 MMOL/L (ref 136–145)
TRIGL SERPL-MCNC: 149 MG/DL (ref 30–150)
TSH SERPL DL<=0.005 MIU/L-ACNC: 1.67 UIU/ML (ref 0.4–4)
WBC # BLD AUTO: 5.6 K/UL (ref 3.9–12.7)

## 2022-02-18 PROCEDURE — 80053 COMPREHEN METABOLIC PANEL: CPT | Performed by: INTERNAL MEDICINE

## 2022-02-18 PROCEDURE — 80061 LIPID PANEL: CPT | Performed by: INTERNAL MEDICINE

## 2022-02-18 PROCEDURE — 85025 COMPLETE CBC W/AUTO DIFF WBC: CPT | Performed by: INTERNAL MEDICINE

## 2022-02-18 PROCEDURE — 83036 HEMOGLOBIN GLYCOSYLATED A1C: CPT | Performed by: INTERNAL MEDICINE

## 2022-02-18 PROCEDURE — 84443 ASSAY THYROID STIM HORMONE: CPT | Performed by: INTERNAL MEDICINE

## 2022-02-18 PROCEDURE — 36415 COLL VENOUS BLD VENIPUNCTURE: CPT | Mod: PO | Performed by: INTERNAL MEDICINE

## 2022-02-22 ENCOUNTER — HOSPITAL ENCOUNTER (OUTPATIENT)
Dept: RADIOLOGY | Facility: HOSPITAL | Age: 62
Discharge: HOME | End: 2022-02-22
Attending: INTERNAL MEDICINE
Payer: COMMERCIAL

## 2022-02-22 ENCOUNTER — PATIENT MESSAGE (OUTPATIENT)
Dept: RESEARCH | Facility: HOSPITAL | Age: 62
End: 2022-02-22
Payer: COMMERCIAL

## 2022-02-22 DIAGNOSIS — Z12.31 ENCOUNTER FOR SCREENING MAMMOGRAM FOR MALIGNANT NEOPLASM OF BREAST: ICD-10-CM

## 2022-02-22 PROCEDURE — 77063 BREAST TOMOSYNTHESIS BI: CPT | Mod: TC,PO

## 2022-02-22 PROCEDURE — 77067 MAMMO DIGITAL SCREENING BILAT WITH TOMO: ICD-10-PCS | Mod: 26,,, | Performed by: RADIOLOGY

## 2022-02-22 PROCEDURE — 77063 BREAST TOMOSYNTHESIS BI: CPT | Mod: 26,,, | Performed by: RADIOLOGY

## 2022-02-22 PROCEDURE — 77063 MAMMO DIGITAL SCREENING BILAT WITH TOMO: ICD-10-PCS | Mod: 26,,, | Performed by: RADIOLOGY

## 2022-02-22 PROCEDURE — 77067 SCR MAMMO BI INCL CAD: CPT | Mod: 26,,, | Performed by: RADIOLOGY

## 2022-02-23 ENCOUNTER — OFFICE VISIT (OUTPATIENT)
Dept: INTERNAL MEDICINE | Facility: CLINIC | Age: 62
End: 2022-02-23
Payer: COMMERCIAL

## 2022-02-23 VITALS
BODY MASS INDEX: 37.2 KG/M2 | DIASTOLIC BLOOD PRESSURE: 78 MMHG | HEART RATE: 64 BPM | HEIGHT: 65 IN | TEMPERATURE: 98 F | OXYGEN SATURATION: 98 % | RESPIRATION RATE: 18 BRPM | WEIGHT: 223.31 LBS | SYSTOLIC BLOOD PRESSURE: 138 MMHG

## 2022-02-23 DIAGNOSIS — Z12.11 COLON CANCER SCREENING: ICD-10-CM

## 2022-02-23 DIAGNOSIS — E78.5 HYPERLIPIDEMIA, UNSPECIFIED HYPERLIPIDEMIA TYPE: ICD-10-CM

## 2022-02-23 DIAGNOSIS — D86.0 SARCOIDOSIS OF LUNG: ICD-10-CM

## 2022-02-23 DIAGNOSIS — R73.03 PRE-DIABETES: Primary | ICD-10-CM

## 2022-02-23 DIAGNOSIS — I10 ESSENTIAL HYPERTENSION: ICD-10-CM

## 2022-02-23 DIAGNOSIS — Z00.00 ANNUAL PHYSICAL EXAM: Primary | ICD-10-CM

## 2022-02-23 DIAGNOSIS — J98.4 CRLD (CHRONIC RESTRICTIVE LUNG DISEASE): ICD-10-CM

## 2022-02-23 DIAGNOSIS — E66.01 SEVERE OBESITY (BMI 35.0-35.9 WITH COMORBIDITY): ICD-10-CM

## 2022-02-23 DIAGNOSIS — R73.03 PRE-DIABETES: ICD-10-CM

## 2022-02-23 DIAGNOSIS — E66.2 OBESITY HYPOVENTILATION SYNDROME: ICD-10-CM

## 2022-02-23 PROCEDURE — 3008F PR BODY MASS INDEX (BMI) DOCUMENTED: ICD-10-PCS | Mod: CPTII,S$GLB,, | Performed by: INTERNAL MEDICINE

## 2022-02-23 PROCEDURE — 3078F DIAST BP <80 MM HG: CPT | Mod: CPTII,S$GLB,, | Performed by: INTERNAL MEDICINE

## 2022-02-23 PROCEDURE — 3075F SYST BP GE 130 - 139MM HG: CPT | Mod: CPTII,S$GLB,, | Performed by: INTERNAL MEDICINE

## 2022-02-23 PROCEDURE — 3008F BODY MASS INDEX DOCD: CPT | Mod: CPTII,S$GLB,, | Performed by: INTERNAL MEDICINE

## 2022-02-23 PROCEDURE — 1159F PR MEDICATION LIST DOCUMENTED IN MEDICAL RECORD: ICD-10-PCS | Mod: CPTII,S$GLB,, | Performed by: INTERNAL MEDICINE

## 2022-02-23 PROCEDURE — 3075F PR MOST RECENT SYSTOLIC BLOOD PRESS GE 130-139MM HG: ICD-10-PCS | Mod: CPTII,S$GLB,, | Performed by: INTERNAL MEDICINE

## 2022-02-23 PROCEDURE — 1160F RVW MEDS BY RX/DR IN RCRD: CPT | Mod: CPTII,S$GLB,, | Performed by: INTERNAL MEDICINE

## 2022-02-23 PROCEDURE — 99396 PREV VISIT EST AGE 40-64: CPT | Mod: S$GLB,,, | Performed by: INTERNAL MEDICINE

## 2022-02-23 PROCEDURE — 3044F PR MOST RECENT HEMOGLOBIN A1C LEVEL <7.0%: ICD-10-PCS | Mod: CPTII,S$GLB,, | Performed by: INTERNAL MEDICINE

## 2022-02-23 PROCEDURE — 99999 PR PBB SHADOW E&M-EST. PATIENT-LVL III: ICD-10-PCS | Mod: PBBFAC,,, | Performed by: INTERNAL MEDICINE

## 2022-02-23 PROCEDURE — 3078F PR MOST RECENT DIASTOLIC BLOOD PRESSURE < 80 MM HG: ICD-10-PCS | Mod: CPTII,S$GLB,, | Performed by: INTERNAL MEDICINE

## 2022-02-23 PROCEDURE — 3066F PR DOCUMENTATION OF TREATMENT FOR NEPHROPATHY: ICD-10-PCS | Mod: CPTII,S$GLB,, | Performed by: INTERNAL MEDICINE

## 2022-02-23 PROCEDURE — 99396 PR PREVENTIVE VISIT,EST,40-64: ICD-10-PCS | Mod: S$GLB,,, | Performed by: INTERNAL MEDICINE

## 2022-02-23 PROCEDURE — 99999 PR PBB SHADOW E&M-EST. PATIENT-LVL III: CPT | Mod: PBBFAC,,, | Performed by: INTERNAL MEDICINE

## 2022-02-23 PROCEDURE — 3061F PR NEG MICROALBUMINURIA RESULT DOCUMENTED/REVIEW: ICD-10-PCS | Mod: CPTII,S$GLB,, | Performed by: INTERNAL MEDICINE

## 2022-02-23 PROCEDURE — 1159F MED LIST DOCD IN RCRD: CPT | Mod: CPTII,S$GLB,, | Performed by: INTERNAL MEDICINE

## 2022-02-23 PROCEDURE — 3066F NEPHROPATHY DOC TX: CPT | Mod: CPTII,S$GLB,, | Performed by: INTERNAL MEDICINE

## 2022-02-23 PROCEDURE — 1160F PR REVIEW ALL MEDS BY PRESCRIBER/CLIN PHARMACIST DOCUMENTED: ICD-10-PCS | Mod: CPTII,S$GLB,, | Performed by: INTERNAL MEDICINE

## 2022-02-23 PROCEDURE — 3044F HG A1C LEVEL LT 7.0%: CPT | Mod: CPTII,S$GLB,, | Performed by: INTERNAL MEDICINE

## 2022-02-23 PROCEDURE — 3061F NEG MICROALBUMINURIA REV: CPT | Mod: CPTII,S$GLB,, | Performed by: INTERNAL MEDICINE

## 2022-02-23 RX ORDER — NEBIVOLOL 20 MG/1
1 TABLET ORAL DAILY
COMMUNITY
Start: 2021-09-08 | End: 2022-02-23

## 2022-03-09 ENCOUNTER — CLINICAL SUPPORT (OUTPATIENT)
Dept: OPHTHALMOLOGY | Facility: CLINIC | Age: 62
End: 2022-03-09
Payer: COMMERCIAL

## 2022-03-09 ENCOUNTER — OFFICE VISIT (OUTPATIENT)
Dept: OPTOMETRY | Facility: CLINIC | Age: 62
End: 2022-03-09
Payer: COMMERCIAL

## 2022-03-09 DIAGNOSIS — H52.03 HYPEROPIA WITH PRESBYOPIA OF BOTH EYES: ICD-10-CM

## 2022-03-09 DIAGNOSIS — H52.4 HYPEROPIA WITH PRESBYOPIA OF BOTH EYES: ICD-10-CM

## 2022-03-09 DIAGNOSIS — H40.1121 PRIMARY OPEN ANGLE GLAUCOMA (POAG) OF LEFT EYE, MILD STAGE: ICD-10-CM

## 2022-03-09 DIAGNOSIS — D86.9 SARCOIDOSIS: ICD-10-CM

## 2022-03-09 DIAGNOSIS — I10 HYPERTENSION, UNSPECIFIED TYPE: ICD-10-CM

## 2022-03-09 DIAGNOSIS — Z13.5 SCREENING FOR EYE CONDITION: ICD-10-CM

## 2022-03-09 DIAGNOSIS — H40.1131 PRIMARY OPEN ANGLE GLAUCOMA (POAG) OF BOTH EYES, MILD STAGE: ICD-10-CM

## 2022-03-09 DIAGNOSIS — H40.053 OCULAR HYPERTENSION, BILATERAL: ICD-10-CM

## 2022-03-09 DIAGNOSIS — H25.13 NUCLEAR SCLEROSIS OF BOTH EYES: ICD-10-CM

## 2022-03-09 DIAGNOSIS — H40.053 OCULAR HYPERTENSION, BILATERAL: Primary | ICD-10-CM

## 2022-03-09 PROCEDURE — 92014 COMPRE OPH EXAM EST PT 1/>: CPT | Mod: S$GLB,,, | Performed by: OPTOMETRIST

## 2022-03-09 PROCEDURE — 3061F NEG MICROALBUMINURIA REV: CPT | Mod: CPTII,S$GLB,, | Performed by: OPTOMETRIST

## 2022-03-09 PROCEDURE — 3044F HG A1C LEVEL LT 7.0%: CPT | Mod: CPTII,S$GLB,, | Performed by: OPTOMETRIST

## 2022-03-09 PROCEDURE — 3044F PR MOST RECENT HEMOGLOBIN A1C LEVEL <7.0%: ICD-10-PCS | Mod: CPTII,S$GLB,, | Performed by: OPTOMETRIST

## 2022-03-09 PROCEDURE — 92014 PR EYE EXAM, EST PATIENT,COMPREHESV: ICD-10-PCS | Mod: S$GLB,,, | Performed by: OPTOMETRIST

## 2022-03-09 PROCEDURE — 99999 PR PBB SHADOW E&M-EST. PATIENT-LVL III: CPT | Mod: PBBFAC,,, | Performed by: OPTOMETRIST

## 2022-03-09 PROCEDURE — 3066F NEPHROPATHY DOC TX: CPT | Mod: CPTII,S$GLB,, | Performed by: OPTOMETRIST

## 2022-03-09 PROCEDURE — 92015 PR REFRACTION: ICD-10-PCS | Mod: S$GLB,,, | Performed by: OPTOMETRIST

## 2022-03-09 PROCEDURE — 1159F MED LIST DOCD IN RCRD: CPT | Mod: CPTII,S$GLB,, | Performed by: OPTOMETRIST

## 2022-03-09 PROCEDURE — 92015 DETERMINE REFRACTIVE STATE: CPT | Mod: S$GLB,,, | Performed by: OPTOMETRIST

## 2022-03-09 PROCEDURE — 3061F PR NEG MICROALBUMINURIA RESULT DOCUMENTED/REVIEW: ICD-10-PCS | Mod: CPTII,S$GLB,, | Performed by: OPTOMETRIST

## 2022-03-09 PROCEDURE — 99999 PR PBB SHADOW E&M-EST. PATIENT-LVL III: ICD-10-PCS | Mod: PBBFAC,,, | Performed by: OPTOMETRIST

## 2022-03-09 PROCEDURE — 3066F PR DOCUMENTATION OF TREATMENT FOR NEPHROPATHY: ICD-10-PCS | Mod: CPTII,S$GLB,, | Performed by: OPTOMETRIST

## 2022-03-09 PROCEDURE — 1159F PR MEDICATION LIST DOCUMENTED IN MEDICAL RECORD: ICD-10-PCS | Mod: CPTII,S$GLB,, | Performed by: OPTOMETRIST

## 2022-03-09 NOTE — PROGRESS NOTES
HPI     general eye examinaton and refraction       Additional comments: Annual general eye examination and refraction.  Prior diagnosis of bilateral ocular hypertension   Currently using Timolol   0.5% ophthalmic solution n both eyes two times per day, in lieu of   Combigan drops which she previously use.  Repeat HVF (24-2) done today              Comments     Patient's age: 62 y.o. female  Occupation: [employer]   Approximate date of last eye examination: 6/23/2021  Name of last eye doctor seen: Dr Romero   City/State: [facility]   Wears glasses? No     If yes, wears  Full-time or part-time?  Part time OTC readers  Present glasses are:   SV OTC Readers +2.00  Approximate age of present glasses:  n/a  Got new glasses following last exam, or subsequently?:  NO    Any problem with VA with glasses?  No problems  Wears CLs?:  No   Headaches?  No   Eye pain/discomfort?  No                                                                                     Flashes?  No   Floaters?  No   Diplopia/Double vision?  No   Patient's Ocular History:          Any eye surgeries? No          Any eye injury?  No          Any treatment for eye disease?  POAG   Family history of eye disease?  No   Significant patient medical history:         1. Diabetes?  No      If yes, IDDM or NIDDM? NA           2. HBP?  Yes, controlled by medication               ! OTC eyedrops currently using:  No    ! Prescription eye meds currently using: Timolol Maleate 0.5 % BID OU   ! Any history of allergy/adverse reaction to any eye meds used   previously?  No    ! Any history of allergy/adverse reaction to eyedrops used during prior   eye exam(s)? No    ! Any history of allergy/adverse reaction to Novacaine or similar meds?   No    ! Any history of allergy/adverse reaction to Epinephrine or similar meds?   No    ! Patient okay with use of anesthetic eyedrops to check eye pressure?    Yes        ! Patient okay with use of eyedrops to dilate pupils  "today?  Yes    !  Allergies/Medications/Medical History/Family History reviewed today?    Yes       PD =   65/61  Desired reading distance =  15.25"                              Last edited by Darrius Romero, OD on 3/9/2022  3:32 PM. (History)            Assessment /Plan     For exam results, see Encounter Report.    1. Ocular hypertension, bilateral  Nick Visual Field - OU - Extended - Both Eyes   2. Nuclear sclerosis of both eyes     3. Primary open angle glaucoma (POAG) of left eye, mild stage  Nick Visual Field - OU - Extended - Both Eyes   4. Sarcoidosis     5. Hypertension, unspecified type     6. Screening for eye condition     7. Hyperopia with presbyopia of both eyes                      Prior diagnosis of bilateral ocular hypertension, and mild stage primary open angle glaucoma of the left eye.  Currently using Timolol Maleate 0.5% ophthalmic solution in both eyes two times per day for IOP control/reduction, in lieu of Combigan ophthalmic solution, as insurance will no longer pay for Combigan.     In today for review of repeat HVF test (24-2) done today, and for  recheck of IOP..  Refer to HVF test results in [place].  Results appear reliable x 3 in each eye.  Taking results at face, no evidence of overtly glaucomatous visual field defect in either eye.     Intraocular pressure within normal range in each eye today:  17 mm Hg n the right eye and 17 mm Hg in the left eye, with the use of Timolol Maleate 0.5% ophthalmic solution two times per day in both eyes.    Hyperopia in each eye, with very satisfactory best-corrected VA in each eye.  Presbyopia consistent with age.  New spectacle lens Rx issued for use as desired.      Recheck in six months, with repeat HVF 24-2 at that time.  Orders entered.  In the interim continue to instill one drop of Timolol Maleate 0.5% ophthalmic solution into each eye two times per day.         "

## 2022-03-09 NOTE — PATIENT INSTRUCTIONS
Prior diagnosis of bilateral ocular hypertension, and mild stage primary open angle glaucoma of the left eye.  Currently using Timolol Maleate 0.5% ophthalmic solution in both eyes two times per day for IOP control/reduction, in lieu of Combigan ophthalmic solution, as insurance will no longer pay for Combigan.     In today for review of repeat HVF test (24-2) done today, and for  recheck of IOP..  Refer to HVF test results in [place].  Results appear reliable x 3 in each eye.  Taking results at face, no evidence of overtly glaucomatous visual field defect in the right eye, and early superior arcuate defect in the left eye, suggestive of mild stageglaucoma.     Intraocular pressure within normal range in each eye today:  17 mm Hg n the right eye and 17 mm Hg in the left eye, with the use of Timolol Maleate 0.5% ophthalmic solution two times per day in both eyes.    Hyperopia in each eye, with very satisfactory best-corrected VA in each eye.  Presbyopia consistent with age.  New spectacle lens Rx issued for use as desired.      Recheck in six months, with repeat HVF 24-2 at that time.  Orders entered.  In the interim continue to instill one drop of Timolol Maleate 0.5% ophthalmic solution into each eye two times per day.

## 2022-03-09 NOTE — PROGRESS NOTES
HVF done ou./Rel/fix poor od good os coop. Good ou./chart checked for latex allergy./.50 + .25 x 95/od .75 /os-smh

## 2022-06-13 ENCOUNTER — TELEPHONE (OUTPATIENT)
Dept: PULMONOLOGY | Facility: CLINIC | Age: 62
End: 2022-06-13
Payer: COMMERCIAL

## 2022-06-13 NOTE — TELEPHONE ENCOUNTER
I spoke with patient to schedule follow up with Dr Owen on 6/24/22 at 3pm. Appointment mailed. Patient confirmed and verbalized understanding.

## 2022-06-13 NOTE — TELEPHONE ENCOUNTER
I spoke with patient. Ms Salazar stated on Saturday she coughed up blood. She stated she had this problem back in 2019 related to her sarcoidosis. Ms Salazar stated she is having sinus issues and has been coughing a lot. But, now she has coughed up blood a few times since Saturday. It is about a nickel size and it is bright red. I let her know I would send a message to Dr Owen for his review and to advise. Once advised I will follow up with her. Patient verbalized understanding.

## 2022-06-13 NOTE — TELEPHONE ENCOUNTER
----- Message from Constanza Kellogg sent at 6/13/2022  8:33 AM CDT -----  Cydney Salazar calling regarding Appointment Access  (message) for spitting up blood from lung.  [phone]

## 2022-06-20 ENCOUNTER — TELEPHONE (OUTPATIENT)
Dept: PULMONOLOGY | Facility: CLINIC | Age: 62
End: 2022-06-20
Payer: COMMERCIAL

## 2022-06-20 NOTE — TELEPHONE ENCOUNTER
Spoke with patient, informed her that I have received her message. Patient states that she is feeling better and does not need appointment with Dr Owen on 6/24/22 and that she will like for me to cancel her appointment with Dr Owen. I verbalized to patient that I understand and advised patient that I cancel her appointment on 6/24/22 and also forward her message to Dr Owen to review/advise. Patient verbalized that she understand.

## 2022-06-20 NOTE — TELEPHONE ENCOUNTER
Spoke with patient  in regards to scheduled appointment on 6/24/2022 at 3:00 pm.  Patient stated she needs to cancel that appointment due to feeling better.  Appointment canceled!  I advised  I will forward this message to  to review and advise.

## 2022-06-20 NOTE — TELEPHONE ENCOUNTER
----- Message from Ayush Prince sent at 6/20/2022  8:13 AM CDT -----  Contact: @ [phone]  Patient requesting a return call about the 6-24th appt, pls call to discuss further

## 2022-08-04 DIAGNOSIS — H40.053 OCULAR HYPERTENSION, BILATERAL: ICD-10-CM

## 2022-08-04 RX ORDER — TIMOLOL MALEATE 5 MG/ML
1 SOLUTION/ DROPS OPHTHALMIC 2 TIMES DAILY
Qty: 15 ML | Refills: 2 | Status: SHIPPED | OUTPATIENT
Start: 2022-08-04 | End: 2022-11-14 | Stop reason: SDUPTHER

## 2022-08-19 ENCOUNTER — LAB VISIT (OUTPATIENT)
Dept: LAB | Facility: HOSPITAL | Age: 62
End: 2022-08-19
Attending: INTERNAL MEDICINE
Payer: COMMERCIAL

## 2022-08-19 DIAGNOSIS — R73.03 PRE-DIABETES: ICD-10-CM

## 2022-08-19 LAB
ESTIMATED AVG GLUCOSE: 126 MG/DL (ref 68–131)
HBA1C MFR BLD: 6 % (ref 4–5.6)

## 2022-08-19 PROCEDURE — 36415 COLL VENOUS BLD VENIPUNCTURE: CPT | Mod: PO | Performed by: INTERNAL MEDICINE

## 2022-08-19 PROCEDURE — 83036 HEMOGLOBIN GLYCOSYLATED A1C: CPT | Performed by: INTERNAL MEDICINE

## 2022-08-22 DIAGNOSIS — E78.49 OTHER HYPERLIPIDEMIA: ICD-10-CM

## 2022-08-23 RX ORDER — ATORVASTATIN CALCIUM 40 MG/1
TABLET, FILM COATED ORAL
Qty: 90 TABLET | Refills: 1 | Status: SHIPPED | OUTPATIENT
Start: 2022-08-23 | End: 2023-08-16 | Stop reason: SDUPTHER

## 2022-08-23 NOTE — TELEPHONE ENCOUNTER
Refill Decision Note   Cydney Salazar  is requesting a refill authorization.  Brief Assessment and Rationale for Refill:  Approve     Medication Therapy Plan:  Acceptable use per ORC protocol    Medication Reconciliation Completed: No   Comments:     No Care Gaps recommended.     Note composed:6:00 AM 08/23/2022

## 2022-08-23 NOTE — TELEPHONE ENCOUNTER
No new care gaps identified.  [facility] Embedded Care Gaps. Reference number: [id number]. 8/22/2022   8:39:58 PM CDT

## 2022-08-24 ENCOUNTER — OFFICE VISIT (OUTPATIENT)
Dept: INTERNAL MEDICINE | Facility: CLINIC | Age: 62
End: 2022-08-24
Payer: COMMERCIAL

## 2022-08-24 VITALS
TEMPERATURE: 97 F | HEIGHT: 65 IN | HEART RATE: 66 BPM | WEIGHT: 224.88 LBS | RESPIRATION RATE: 20 BRPM | SYSTOLIC BLOOD PRESSURE: 132 MMHG | DIASTOLIC BLOOD PRESSURE: 62 MMHG | BODY MASS INDEX: 37.47 KG/M2 | OXYGEN SATURATION: 93 %

## 2022-08-24 DIAGNOSIS — R73.03 PRE-DIABETES: ICD-10-CM

## 2022-08-24 DIAGNOSIS — E66.01 SEVERE OBESITY (BMI 35.0-35.9 WITH COMORBIDITY): ICD-10-CM

## 2022-08-24 DIAGNOSIS — I10 ESSENTIAL HYPERTENSION: Primary | ICD-10-CM

## 2022-08-24 DIAGNOSIS — M85.80 OSTEOPENIA, UNSPECIFIED LOCATION: ICD-10-CM

## 2022-08-24 DIAGNOSIS — E78.5 HYPERLIPIDEMIA, UNSPECIFIED HYPERLIPIDEMIA TYPE: ICD-10-CM

## 2022-08-24 DIAGNOSIS — J98.4 CRLD (CHRONIC RESTRICTIVE LUNG DISEASE): ICD-10-CM

## 2022-08-24 DIAGNOSIS — E66.2 OBESITY HYPOVENTILATION SYNDROME: ICD-10-CM

## 2022-08-24 PROCEDURE — 1160F PR REVIEW ALL MEDS BY PRESCRIBER/CLIN PHARMACIST DOCUMENTED: ICD-10-PCS | Mod: CPTII,S$GLB,, | Performed by: INTERNAL MEDICINE

## 2022-08-24 PROCEDURE — 99999 PR PBB SHADOW E&M-EST. PATIENT-LVL IV: ICD-10-PCS | Mod: PBBFAC,,, | Performed by: INTERNAL MEDICINE

## 2022-08-24 PROCEDURE — 99999 PR PBB SHADOW E&M-EST. PATIENT-LVL IV: CPT | Mod: PBBFAC,,, | Performed by: INTERNAL MEDICINE

## 2022-08-24 PROCEDURE — 3075F PR MOST RECENT SYSTOLIC BLOOD PRESS GE 130-139MM HG: ICD-10-PCS | Mod: CPTII,S$GLB,, | Performed by: INTERNAL MEDICINE

## 2022-08-24 PROCEDURE — 99214 PR OFFICE/OUTPT VISIT, EST, LEVL IV, 30-39 MIN: ICD-10-PCS | Mod: S$GLB,,, | Performed by: INTERNAL MEDICINE

## 2022-08-24 PROCEDURE — 3066F NEPHROPATHY DOC TX: CPT | Mod: CPTII,S$GLB,, | Performed by: INTERNAL MEDICINE

## 2022-08-24 PROCEDURE — 3078F PR MOST RECENT DIASTOLIC BLOOD PRESSURE < 80 MM HG: ICD-10-PCS | Mod: CPTII,S$GLB,, | Performed by: INTERNAL MEDICINE

## 2022-08-24 PROCEDURE — 3044F HG A1C LEVEL LT 7.0%: CPT | Mod: CPTII,S$GLB,, | Performed by: INTERNAL MEDICINE

## 2022-08-24 PROCEDURE — 3061F PR NEG MICROALBUMINURIA RESULT DOCUMENTED/REVIEW: ICD-10-PCS | Mod: CPTII,S$GLB,, | Performed by: INTERNAL MEDICINE

## 2022-08-24 PROCEDURE — 3008F BODY MASS INDEX DOCD: CPT | Mod: CPTII,S$GLB,, | Performed by: INTERNAL MEDICINE

## 2022-08-24 PROCEDURE — 3066F PR DOCUMENTATION OF TREATMENT FOR NEPHROPATHY: ICD-10-PCS | Mod: CPTII,S$GLB,, | Performed by: INTERNAL MEDICINE

## 2022-08-24 PROCEDURE — 3061F NEG MICROALBUMINURIA REV: CPT | Mod: CPTII,S$GLB,, | Performed by: INTERNAL MEDICINE

## 2022-08-24 PROCEDURE — 3075F SYST BP GE 130 - 139MM HG: CPT | Mod: CPTII,S$GLB,, | Performed by: INTERNAL MEDICINE

## 2022-08-24 PROCEDURE — 99214 OFFICE O/P EST MOD 30 MIN: CPT | Mod: S$GLB,,, | Performed by: INTERNAL MEDICINE

## 2022-08-24 PROCEDURE — 3078F DIAST BP <80 MM HG: CPT | Mod: CPTII,S$GLB,, | Performed by: INTERNAL MEDICINE

## 2022-08-24 PROCEDURE — 1159F PR MEDICATION LIST DOCUMENTED IN MEDICAL RECORD: ICD-10-PCS | Mod: CPTII,S$GLB,, | Performed by: INTERNAL MEDICINE

## 2022-08-24 PROCEDURE — 3008F PR BODY MASS INDEX (BMI) DOCUMENTED: ICD-10-PCS | Mod: CPTII,S$GLB,, | Performed by: INTERNAL MEDICINE

## 2022-08-24 PROCEDURE — 1160F RVW MEDS BY RX/DR IN RCRD: CPT | Mod: CPTII,S$GLB,, | Performed by: INTERNAL MEDICINE

## 2022-08-24 PROCEDURE — 1159F MED LIST DOCD IN RCRD: CPT | Mod: CPTII,S$GLB,, | Performed by: INTERNAL MEDICINE

## 2022-08-24 PROCEDURE — 3044F PR MOST RECENT HEMOGLOBIN A1C LEVEL <7.0%: ICD-10-PCS | Mod: CPTII,S$GLB,, | Performed by: INTERNAL MEDICINE

## 2022-08-24 NOTE — PROGRESS NOTES
Subjective:       Patient ID: Cydney Salazar is a 62 y.o. female.    Chief Complaint: Follow-up (6 month f/u )    HPI   Pt with HTN, Severe obesity, HLD, Pulmonary sarcoidosis, KAREY, Prediabetes is here for 6 month f/u.   Review of Systems   Constitutional: Negative for activity change, appetite change, chills, diaphoresis, fatigue, fever and unexpected weight change.   HENT: Negative for postnasal drip, rhinorrhea, sinus pressure/congestion, sneezing, sore throat, trouble swallowing and voice change.    Respiratory: Negative for cough, shortness of breath and wheezing.    Cardiovascular: Negative for chest pain, palpitations and leg swelling.   Gastrointestinal: Negative for abdominal pain, blood in stool, constipation, diarrhea, nausea and vomiting.   Genitourinary: Negative for dysuria.   Musculoskeletal: Negative for arthralgias and myalgias.   Integumentary:  Negative for rash and wound.   Allergic/Immunologic: Negative for environmental allergies and food allergies.   Hematological: Negative for adenopathy. Does not bruise/bleed easily.         Objective:      Physical Exam  Constitutional:       General: She is not in acute distress.     Appearance: She is well-developed. She is not diaphoretic.   HENT:      Head: Normocephalic and atraumatic.      Right Ear: External ear normal.      Left Ear: External ear normal.      Nose: Nose normal.      Mouth/Throat:      Pharynx: No oropharyngeal exudate.   Eyes:      General: No scleral icterus.        Right eye: No discharge.         Left eye: No discharge.      Conjunctiva/sclera: Conjunctivae normal.      Pupils: Pupils are equal, round, and reactive to light.   Neck:      Vascular: No JVD.   Cardiovascular:      Rate and Rhythm: Normal rate and regular rhythm.      Heart sounds: Normal heart sounds.   Pulmonary:      Effort: Pulmonary effort is normal. No respiratory distress.      Breath sounds: Normal breath sounds. No wheezing or rales.   Musculoskeletal:       Cervical back: Neck supple.   Lymphadenopathy:      Cervical: No cervical adenopathy.   Skin:     General: Skin is warm and dry.      Coloration: Skin is not pale.      Findings: No rash.   Neurological:      Mental Status: She is alert and oriented to person, place, and time.         Assessment:       Problem List Items Addressed This Visit        Pulmonary    CRLD (chronic restrictive lung disease)       Cardiac/Vascular    Hyperlipidemia    Essential hypertension - Primary       Endocrine    Severe obesity (BMI 35.0-35.9 with comorbidity)    Pre-diabetes       Orthopedic    Osteopenia    Relevant Orders    DXA Bone Density Spine And Hip       Other    Obesity hypoventilation syndrome          Plan:    HTN- stable on current meds      Severe obesity- diet/exercise stressed       HLD- continue Lipitor 40 mg daily       Pulmonary sarcoidosis- followed by Pulm       KAREY- stable on CPAP      Prediabetes- last HA1C of 6.0(8/22)<--6.0(2/22)<--5.9(8/21)<--6.1(1/21)      F/u in 6 months for annual exam

## 2022-08-26 DIAGNOSIS — E78.5 HYPERLIPIDEMIA, UNSPECIFIED HYPERLIPIDEMIA TYPE: Primary | ICD-10-CM

## 2022-08-26 DIAGNOSIS — R73.03 PRE-DIABETES: ICD-10-CM

## 2022-08-26 DIAGNOSIS — Z00.00 ANNUAL PHYSICAL EXAM: ICD-10-CM

## 2022-08-26 DIAGNOSIS — I10 ESSENTIAL HYPERTENSION: ICD-10-CM

## 2022-09-03 NOTE — PROGRESS NOTES
Subjective:       Patient ID: Cydney Salazar is a 62 y.o. female.    Chief Complaint: Annual Exam (Labs 22)    HPI   62 y.o. Female here for annual exam.      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  No date: Cataract  No date: Glaucoma  No date: Hypertension  No date: Obesity  No date: Prediabetes  No date: Sarcoidosis      Comment:  in remission  No date: Sleep apnea      Comment:  uses cpap  Past Surgical History:  No date:  SECTION  2018: EMBOLIZATION; N/A      Comment:  Procedure: Embolization;  Surgeon: Whit Surgeon;                 Location: [facility];  Service: Radiology;  Laterality:                N/A;  3/27/2019: EMBOLIZATION; N/A      Comment:  Procedure: EMBOLIZATION, BLOOD VESSEL-AVM;  Surgeon:                Whit Surgeon;  Location: [facility];  Service:                Anesthesiology;  Laterality: N/A;  Room 189/[facility]  No date: ir embolization of pulmonary AVM  Social History    Socioeconomic History      Marital status:       Spouse name: Not on file      Number of children: Not on file      Years of education: Not on file      Highest education level: Not on file    Occupational History      Not on file    Tobacco Use      Smoking status: Never Smoker      Smokeless tobacco: Never Used      Tobacco comment:  smokes, usually not in the home    Substance and Sexual Activity      Alcohol use: Yes        Comment: rarely      Drug use: No      Sexual activity: Yes        Partners: Male        Birth control/protection: Post-menopausal, None    Other Topics      Concerns:        Not on file    Social History Narrative      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     Social Determinants of Health  Financial  Resource Strain:       Difficulty of Paying Living Expenses: Not on file  Food Insecurity:       Worried About Running Out of Food in the Last Year: Not on file      Ran Out of Food in the Last Year: Not on file  Transportation Needs:       Lack of Transportation (Medical): Not on file      Lack of Transportation (Non-Medical): Not on file  Physical Activity:       Days of Exercise per Week: Not on file      Minutes of Exercise per Session: Not on file  Stress:       Feeling of Stress : Not on file  Social Connections:       Frequency of Communication with Friends and Family: Not on file      Frequency of Social Gatherings with Friends and Family: Not on file      Attends Sikhism Services: Not on file      Active Member of Clubs or Organizations: Not on file      Attends Club or Organization Meetings: Not on file      Marital Status: Not on file  Review of patient's allergies indicates:  No Known Allergies  Review of Systems   Constitutional: Negative for activity change, appetite change, chills, diaphoresis, fatigue, fever and unexpected weight change.   HENT: Negative for nasal congestion, mouth sores, postnasal drip, rhinorrhea, sinus pressure/congestion, sneezing, sore throat, trouble swallowing and voice change.    Eyes: Negative for pain, discharge and visual disturbance.   Respiratory: Negative for cough, shortness of breath and wheezing.    Cardiovascular: Negative for chest pain, palpitations and leg swelling.   Gastrointestinal: Negative for abdominal pain, blood in stool, constipation, diarrhea, nausea and vomiting.   Endocrine: Negative for cold intolerance and heat intolerance.   Genitourinary: Negative for difficulty urinating, dysuria, frequency, hematuria and urgency.   Musculoskeletal: Negative for arthralgias and myalgias.   Integumentary:  Negative for rash and wound.   Allergic/Immunologic: Negative for environmental allergies and food allergies.   Neurological: Negative for dizziness, tremors,  seizures, syncope, weakness, light-headedness and headaches.   Hematological: Negative for adenopathy. Does not bruise/bleed easily.   Psychiatric/Behavioral: Negative for confusion and sleep disturbance. The patient is not nervous/anxious.          Objective:      Physical Exam  Vitals and nursing note reviewed.   Constitutional:       General: She is not in acute distress.     Appearance: She is well-developed. She is not diaphoretic.   HENT:      Head: Normocephalic and atraumatic.      Right Ear: External ear normal.      Left Ear: External ear normal.      Nose: Nose normal.      Mouth/Throat:      Pharynx: No oropharyngeal exudate.   Eyes:      General: No scleral icterus.        Right eye: No discharge.         Left eye: No discharge.      Conjunctiva/sclera: Conjunctivae normal.      Pupils: Pupils are equal, round, and reactive to light.   Neck:      Thyroid: No thyromegaly.      Vascular: No JVD.   Cardiovascular:      Rate and Rhythm: Normal rate and regular rhythm.      Heart sounds: Normal heart sounds. No murmur heard.  Pulmonary:      Effort: Pulmonary effort is normal. No respiratory distress.      Breath sounds: Normal breath sounds. No wheezing or rales.   Chest:      Chest wall: No tenderness.   Abdominal:      General: Bowel sounds are normal. There is no distension.      Palpations: Abdomen is soft.      Tenderness: There is no abdominal tenderness. There is no guarding.   Musculoskeletal:      Cervical back: Neck supple.   Lymphadenopathy:      Cervical: No cervical adenopathy.   Skin:     General: Skin is warm and dry.      Coloration: Skin is not pale.      Findings: No rash.   Neurological:      Mental Status: She is alert and oriented to person, place, and time.   Psychiatric:         Judgment: Judgment normal.         Assessment:       Problem List Items Addressed This Visit        Pulmonary    CRLD (chronic restrictive lung disease)       Cardiac/Vascular    Hyperlipidemia    Essential  hypertension       Immunology/Multi System    Sarcoidosis of lung       Endocrine    Severe obesity (BMI 35.0-35.9 with comorbidity)    Pre-diabetes       Other    Obesity hypoventilation syndrome      Other Visit Diagnoses     Annual physical exam    -  Primary          Plan:    Blood work reviewed with pt      HTN- stable on current meds      Severe obesity- diet/exercise stressed       HLD- continue Lipitor 40 mg daily       Pulmonary sarcoidosis- followed by Pulm       KAREY- stable on CPAP      Prediabetes- last HA1C of 6.0(2/22)<--5.9(8/21)<--6.1(1/21)      F/u in 6 months             no chills/no decreased eating/drinking/no dizziness/no fever/no nausea/no pain/no tingling/no vomiting/no weakness

## 2022-09-21 ENCOUNTER — APPOINTMENT (OUTPATIENT)
Dept: RADIOLOGY | Facility: CLINIC | Age: 62
End: 2022-09-21
Attending: INTERNAL MEDICINE
Payer: COMMERCIAL

## 2022-09-21 DIAGNOSIS — M85.80 OSTEOPENIA, UNSPECIFIED LOCATION: ICD-10-CM

## 2022-09-21 PROCEDURE — 77080 DEXA BONE DENSITY SPINE HIP: ICD-10-PCS | Mod: 26,,, | Performed by: INTERNAL MEDICINE

## 2022-09-21 PROCEDURE — 77080 DXA BONE DENSITY AXIAL: CPT | Mod: 26,,, | Performed by: INTERNAL MEDICINE

## 2022-09-21 PROCEDURE — 77080 DXA BONE DENSITY AXIAL: CPT | Mod: TC,PO

## 2022-10-07 ENCOUNTER — PATIENT MESSAGE (OUTPATIENT)
Dept: SLEEP MEDICINE | Facility: CLINIC | Age: 62
End: 2022-10-07
Payer: COMMERCIAL

## 2022-10-10 ENCOUNTER — TELEPHONE (OUTPATIENT)
Dept: SLEEP MEDICINE | Facility: CLINIC | Age: 62
End: 2022-10-10
Payer: COMMERCIAL

## 2022-10-10 DIAGNOSIS — G47.33 OSA (OBSTRUCTIVE SLEEP APNEA): Primary | ICD-10-CM

## 2022-10-10 NOTE — TELEPHONE ENCOUNTER
Spoke to the pt and she is asking for a new prescription be sent to [facility] for a new CPAP machine. She stated that her old machine has been recall and she did registered it. She stated that is time for a new machine because she have been having her machine for over 5 years.

## 2022-10-13 RX ORDER — IRBESARTAN AND HYDROCHLOROTHIAZIDE 300; 12.5 MG/1; MG/1
TABLET, FILM COATED ORAL
Qty: 90 TABLET | Refills: 3 | Status: SHIPPED | OUTPATIENT
Start: 2022-10-13 | End: 2023-08-16 | Stop reason: SDUPTHER

## 2022-10-13 RX ORDER — ATENOLOL 100 MG/1
100 TABLET ORAL DAILY
Qty: 90 TABLET | Refills: 3 | Status: SHIPPED | OUTPATIENT
Start: 2022-10-13 | End: 2023-08-16 | Stop reason: SDUPTHER

## 2022-10-13 NOTE — TELEPHONE ENCOUNTER
No new care gaps identified.  [facility] Embedded Care Gaps. Reference number: [id number]. 10/13/2022   10:32:36 AM GLORIAT

## 2022-10-13 NOTE — TELEPHONE ENCOUNTER
----- Message from Nereida Esposito sent at 10/13/2022  9:29 AM CDT -----  Contact: self/[phone]  Requesting an RX refill or new RX.  Is this a refill or new RX: refill  RX name and strength (copy/paste from chart):  irbesartan-hydrochlorothiazide (AVALIDE) 300-12.5 mg per tablet 90 tablet 3 10/4/2021  No  Sig: Take 1 tablet by mouth every morning  Is this a 30 day or 90 day RX: 90  Pharmacy name and phone # (copy/paste from chart):   [facility] ST[facility] #[id number] [place], [place] - [address]  [address]  [facility] [address]  Phone: [phone] Fax: [phone]  The doctors have asked that we provide their patients with the following 2 reminders -- prescription refills can take up to 72 hours, and a friendly reminder that in the future you can use your [facility] account to request refills: call back      Requesting an RX refill or new RX.  Is this a refill or new RX: refill  RX name and strength (copy/paste from chart):  atenoloL (TENORMIN) 100 MG tablet 90 tablet 3 11/8/2021 11/8/2022  Sig: Take 1 tablet (100 mg total) by mouth once daily.  Is this a 30 day or 90 day RX: 90  Pharmacy name and phone # (copy/paste from chart):    [facility] #[id number] [place], [place] - [address]  [address]  [facility] [address]  Phone: [phone] Fax: [phone]  The doctors have asked that we provide their patients with the following 2 reminders -- prescription refills can take up to 72 hours, and a friendly reminder that in the future you can use your [facility] account to request refills: call back    Please advise

## 2022-11-14 DIAGNOSIS — H40.053 OCULAR HYPERTENSION, BILATERAL: ICD-10-CM

## 2022-11-14 RX ORDER — TIMOLOL MALEATE 5 MG/ML
1 SOLUTION/ DROPS OPHTHALMIC 2 TIMES DAILY
Qty: 15 ML | Refills: 2 | Status: SHIPPED | OUTPATIENT
Start: 2022-11-14 | End: 2023-02-01 | Stop reason: SDUPTHER

## 2022-11-15 ENCOUNTER — TELEPHONE (OUTPATIENT)
Dept: ENDOSCOPY | Facility: HOSPITAL | Age: 62
End: 2022-11-15
Payer: COMMERCIAL

## 2022-11-21 ENCOUNTER — TELEPHONE (OUTPATIENT)
Dept: INTERNAL MEDICINE | Facility: CLINIC | Age: 62
End: 2022-11-21
Payer: COMMERCIAL

## 2022-11-21 NOTE — TELEPHONE ENCOUNTER
Spoke to patient she was advised that rx was already sent  since October 13 and the pharmacy had it on hold .

## 2022-11-21 NOTE — TELEPHONE ENCOUNTER
----- Message from Kellee Staples sent at 11/21/2022  8:53 AM CST -----  Contact: Pt [phone]  Requesting an RX refill or new RX.  Is this a refill or new RX: Refill  RX name and strength atenoloL (TENORMIN) 100 MG tablet  Is this a 30 day or 90 day RX:   Pharmacy name and phone # [facility] #[id number] - [place] [address] [facility] [address] AT [address] Phone:  [phone]  Fax:  [phone]

## 2022-11-28 ENCOUNTER — TELEPHONE (OUTPATIENT)
Dept: ENDOSCOPY | Facility: HOSPITAL | Age: 62
End: 2022-11-28
Payer: COMMERCIAL

## 2022-11-28 ENCOUNTER — TELEPHONE (OUTPATIENT)
Dept: INTERNAL MEDICINE | Facility: CLINIC | Age: 62
End: 2022-11-28
Payer: COMMERCIAL

## 2022-11-28 DIAGNOSIS — Z12.11 COLON CANCER SCREENING: Primary | ICD-10-CM

## 2022-11-28 NOTE — TELEPHONE ENCOUNTER
MA spoke with patient to schedule procedure but no referral was put into the system.to help patient schedule.

## 2022-11-28 NOTE — TELEPHONE ENCOUNTER
----- Message from Madelaine Casey sent at 11/28/2022  9:24 AM CST -----  Contact: [phone] pt  Pt is checking on the status of the colonoscopy order to be put in. Per pt, please give her a call.          Thank you

## 2023-01-18 ENCOUNTER — CLINICAL SUPPORT (OUTPATIENT)
Dept: ENDOSCOPY | Facility: HOSPITAL | Age: 63
End: 2023-01-18
Attending: INTERNAL MEDICINE
Payer: COMMERCIAL

## 2023-01-18 VITALS — HEIGHT: 65 IN | BODY MASS INDEX: 36.65 KG/M2 | WEIGHT: 220 LBS

## 2023-01-18 DIAGNOSIS — Z12.11 COLON CANCER SCREENING: ICD-10-CM

## 2023-01-18 DIAGNOSIS — Z86.010 HISTORY OF COLON POLYPS: Primary | ICD-10-CM

## 2023-01-18 RX ORDER — POLYETHYLENE GLYCOL 3350, SODIUM SULFATE ANHYDROUS, SODIUM BICARBONATE, SODIUM CHLORIDE, POTASSIUM CHLORIDE 236; 22.74; 6.74; 5.86; 2.97 G/4L; G/4L; G/4L; G/4L; G/4L
4 POWDER, FOR SOLUTION ORAL ONCE
Qty: 4000 ML | Refills: 0 | Status: SHIPPED | OUTPATIENT
Start: 2023-01-18 | End: 2023-01-18

## 2023-02-01 DIAGNOSIS — H40.053 OCULAR HYPERTENSION, BILATERAL: ICD-10-CM

## 2023-02-01 RX ORDER — TIMOLOL MALEATE 5 MG/ML
1 SOLUTION/ DROPS OPHTHALMIC 2 TIMES DAILY
Qty: 15 ML | Refills: 2 | Status: SHIPPED | OUTPATIENT
Start: 2023-02-01 | End: 2024-02-01

## 2023-02-08 ENCOUNTER — LAB VISIT (OUTPATIENT)
Dept: LAB | Facility: HOSPITAL | Age: 63
End: 2023-02-08
Payer: COMMERCIAL

## 2023-02-08 DIAGNOSIS — E78.5 HYPERLIPIDEMIA, UNSPECIFIED HYPERLIPIDEMIA TYPE: ICD-10-CM

## 2023-02-08 DIAGNOSIS — R73.03 PRE-DIABETES: ICD-10-CM

## 2023-02-08 DIAGNOSIS — I10 ESSENTIAL HYPERTENSION: ICD-10-CM

## 2023-02-08 DIAGNOSIS — Z00.00 ANNUAL PHYSICAL EXAM: ICD-10-CM

## 2023-02-08 LAB
ALBUMIN SERPL BCP-MCNC: 3.6 G/DL (ref 3.5–5.2)
ALP SERPL-CCNC: 78 U/L (ref 55–135)
ALT SERPL W/O P-5'-P-CCNC: 21 U/L (ref 10–44)
ANION GAP SERPL CALC-SCNC: 10 MMOL/L (ref 8–16)
AST SERPL-CCNC: 15 U/L (ref 10–40)
BASOPHILS # BLD AUTO: 0.04 K/UL (ref 0–0.2)
BASOPHILS NFR BLD: 0.7 % (ref 0–1.9)
BILIRUB SERPL-MCNC: 0.3 MG/DL (ref 0.1–1)
BUN SERPL-MCNC: 15 MG/DL (ref 8–23)
CALCIUM SERPL-MCNC: 9.3 MG/DL (ref 8.7–10.5)
CHLORIDE SERPL-SCNC: 104 MMOL/L (ref 95–110)
CHOLEST SERPL-MCNC: 172 MG/DL (ref 120–199)
CHOLEST/HDLC SERPL: 4.2 {RATIO} (ref 2–5)
CO2 SERPL-SCNC: 28 MMOL/L (ref 23–29)
CREAT SERPL-MCNC: 0.7 MG/DL (ref 0.5–1.4)
DIFFERENTIAL METHOD: ABNORMAL
EOSINOPHIL # BLD AUTO: 0.2 K/UL (ref 0–0.5)
EOSINOPHIL NFR BLD: 3.3 % (ref 0–8)
ERYTHROCYTE [DISTWIDTH] IN BLOOD BY AUTOMATED COUNT: 15.1 % (ref 11.5–14.5)
EST. GFR  (NO RACE VARIABLE): >60 ML/MIN/1.73 M^2
ESTIMATED AVG GLUCOSE: 128 MG/DL (ref 68–131)
GLUCOSE SERPL-MCNC: 109 MG/DL (ref 70–110)
HBA1C MFR BLD: 6.1 % (ref 4–5.6)
HCT VFR BLD AUTO: 42.8 % (ref 37–48.5)
HDLC SERPL-MCNC: 41 MG/DL (ref 40–75)
HDLC SERPL: 23.8 % (ref 20–50)
HGB BLD-MCNC: 12.8 G/DL (ref 12–16)
IMM GRANULOCYTES # BLD AUTO: 0.01 K/UL (ref 0–0.04)
IMM GRANULOCYTES NFR BLD AUTO: 0.2 % (ref 0–0.5)
LDLC SERPL CALC-MCNC: 100 MG/DL (ref 63–159)
LYMPHOCYTES # BLD AUTO: 2 K/UL (ref 1–4.8)
LYMPHOCYTES NFR BLD: 37.4 % (ref 18–48)
MCH RBC QN AUTO: 28.3 PG (ref 27–31)
MCHC RBC AUTO-ENTMCNC: 29.9 G/DL (ref 32–36)
MCV RBC AUTO: 95 FL (ref 82–98)
MONOCYTES # BLD AUTO: 0.5 K/UL (ref 0.3–1)
MONOCYTES NFR BLD: 8.8 % (ref 4–15)
NEUTROPHILS # BLD AUTO: 2.7 K/UL (ref 1.8–7.7)
NEUTROPHILS NFR BLD: 49.6 % (ref 38–73)
NONHDLC SERPL-MCNC: 131 MG/DL
NRBC BLD-RTO: 0 /100 WBC
PLATELET # BLD AUTO: 305 K/UL (ref 150–450)
PMV BLD AUTO: 10.7 FL (ref 9.2–12.9)
POTASSIUM SERPL-SCNC: 4.3 MMOL/L (ref 3.5–5.1)
PROT SERPL-MCNC: 7.1 G/DL (ref 6–8.4)
RBC # BLD AUTO: 4.53 M/UL (ref 4–5.4)
SODIUM SERPL-SCNC: 142 MMOL/L (ref 136–145)
TRIGL SERPL-MCNC: 155 MG/DL (ref 30–150)
TSH SERPL DL<=0.005 MIU/L-ACNC: 1.93 UIU/ML (ref 0.4–4)
WBC # BLD AUTO: 5.45 K/UL (ref 3.9–12.7)

## 2023-02-08 PROCEDURE — 36415 COLL VENOUS BLD VENIPUNCTURE: CPT | Mod: PO | Performed by: INTERNAL MEDICINE

## 2023-02-08 PROCEDURE — 83036 HEMOGLOBIN GLYCOSYLATED A1C: CPT | Performed by: INTERNAL MEDICINE

## 2023-02-08 PROCEDURE — 84443 ASSAY THYROID STIM HORMONE: CPT | Performed by: INTERNAL MEDICINE

## 2023-02-08 PROCEDURE — 80061 LIPID PANEL: CPT | Performed by: INTERNAL MEDICINE

## 2023-02-08 PROCEDURE — 85025 COMPLETE CBC W/AUTO DIFF WBC: CPT | Performed by: INTERNAL MEDICINE

## 2023-02-08 PROCEDURE — 80053 COMPREHEN METABOLIC PANEL: CPT | Performed by: INTERNAL MEDICINE

## 2023-02-15 ENCOUNTER — OFFICE VISIT (OUTPATIENT)
Dept: INTERNAL MEDICINE | Facility: CLINIC | Age: 63
End: 2023-02-15
Payer: COMMERCIAL

## 2023-02-15 DIAGNOSIS — E78.5 HYPERLIPIDEMIA, UNSPECIFIED HYPERLIPIDEMIA TYPE: ICD-10-CM

## 2023-02-15 DIAGNOSIS — E66.2 OBESITY HYPOVENTILATION SYNDROME: ICD-10-CM

## 2023-02-15 DIAGNOSIS — Z00.00 ANNUAL PHYSICAL EXAM: ICD-10-CM

## 2023-02-15 DIAGNOSIS — D86.0 SARCOIDOSIS OF LUNG: Primary | ICD-10-CM

## 2023-02-15 DIAGNOSIS — Z12.31 ENCOUNTER FOR SCREENING MAMMOGRAM FOR BREAST CANCER: ICD-10-CM

## 2023-02-15 DIAGNOSIS — I10 ESSENTIAL HYPERTENSION: Primary | ICD-10-CM

## 2023-02-15 DIAGNOSIS — R73.03 PRE-DIABETES: ICD-10-CM

## 2023-02-15 DIAGNOSIS — M79.644 PAIN OF FINGER OF RIGHT HAND: ICD-10-CM

## 2023-02-15 DIAGNOSIS — E66.01 SEVERE OBESITY (BMI 35.0-35.9 WITH COMORBIDITY): ICD-10-CM

## 2023-02-15 DIAGNOSIS — I10 ESSENTIAL HYPERTENSION: ICD-10-CM

## 2023-02-15 DIAGNOSIS — J98.4 CRLD (CHRONIC RESTRICTIVE LUNG DISEASE): ICD-10-CM

## 2023-02-15 PROCEDURE — 3066F PR DOCUMENTATION OF TREATMENT FOR NEPHROPATHY: ICD-10-PCS | Mod: CPTII,S$GLB,, | Performed by: INTERNAL MEDICINE

## 2023-02-15 PROCEDURE — 99999 PR PBB SHADOW E&M-EST. PATIENT-LVL IV: ICD-10-PCS | Mod: PBBFAC,,, | Performed by: INTERNAL MEDICINE

## 2023-02-15 PROCEDURE — 3060F PR POS MICROALBUMINURIA RESULT DOCUMENTED/REVIEW: ICD-10-PCS | Mod: CPTII,S$GLB,, | Performed by: INTERNAL MEDICINE

## 2023-02-15 PROCEDURE — 3078F DIAST BP <80 MM HG: CPT | Mod: CPTII,S$GLB,, | Performed by: INTERNAL MEDICINE

## 2023-02-15 PROCEDURE — 90677 PNEUMOCOCCAL CONJUGATE VACCINE 20-VALENT: ICD-10-PCS | Mod: S$GLB,,, | Performed by: INTERNAL MEDICINE

## 2023-02-15 PROCEDURE — 90677 PCV20 VACCINE IM: CPT | Mod: S$GLB,,, | Performed by: INTERNAL MEDICINE

## 2023-02-15 PROCEDURE — 99396 PR PREVENTIVE VISIT,EST,40-64: ICD-10-PCS | Mod: 25,S$GLB,, | Performed by: INTERNAL MEDICINE

## 2023-02-15 PROCEDURE — 90471 PNEUMOCOCCAL CONJUGATE VACCINE 20-VALENT: ICD-10-PCS | Mod: S$GLB,,, | Performed by: INTERNAL MEDICINE

## 2023-02-15 PROCEDURE — 1160F RVW MEDS BY RX/DR IN RCRD: CPT | Mod: CPTII,S$GLB,, | Performed by: INTERNAL MEDICINE

## 2023-02-15 PROCEDURE — 3078F PR MOST RECENT DIASTOLIC BLOOD PRESSURE < 80 MM HG: ICD-10-PCS | Mod: CPTII,S$GLB,, | Performed by: INTERNAL MEDICINE

## 2023-02-15 PROCEDURE — 3044F HG A1C LEVEL LT 7.0%: CPT | Mod: CPTII,S$GLB,, | Performed by: INTERNAL MEDICINE

## 2023-02-15 PROCEDURE — 99396 PREV VISIT EST AGE 40-64: CPT | Mod: 25,S$GLB,, | Performed by: INTERNAL MEDICINE

## 2023-02-15 PROCEDURE — 3044F PR MOST RECENT HEMOGLOBIN A1C LEVEL <7.0%: ICD-10-PCS | Mod: CPTII,S$GLB,, | Performed by: INTERNAL MEDICINE

## 2023-02-15 PROCEDURE — 3060F POS MICROALBUMINURIA REV: CPT | Mod: CPTII,S$GLB,, | Performed by: INTERNAL MEDICINE

## 2023-02-15 PROCEDURE — 90471 IMMUNIZATION ADMIN: CPT | Mod: S$GLB,,, | Performed by: INTERNAL MEDICINE

## 2023-02-15 PROCEDURE — 99999 PR PBB SHADOW E&M-EST. PATIENT-LVL IV: CPT | Mod: PBBFAC,,, | Performed by: INTERNAL MEDICINE

## 2023-02-15 PROCEDURE — 3075F SYST BP GE 130 - 139MM HG: CPT | Mod: CPTII,S$GLB,, | Performed by: INTERNAL MEDICINE

## 2023-02-15 PROCEDURE — 3066F NEPHROPATHY DOC TX: CPT | Mod: CPTII,S$GLB,, | Performed by: INTERNAL MEDICINE

## 2023-02-15 PROCEDURE — 3008F PR BODY MASS INDEX (BMI) DOCUMENTED: ICD-10-PCS | Mod: CPTII,S$GLB,, | Performed by: INTERNAL MEDICINE

## 2023-02-15 PROCEDURE — 1160F PR REVIEW ALL MEDS BY PRESCRIBER/CLIN PHARMACIST DOCUMENTED: ICD-10-PCS | Mod: CPTII,S$GLB,, | Performed by: INTERNAL MEDICINE

## 2023-02-15 PROCEDURE — 1159F MED LIST DOCD IN RCRD: CPT | Mod: CPTII,S$GLB,, | Performed by: INTERNAL MEDICINE

## 2023-02-15 PROCEDURE — 3008F BODY MASS INDEX DOCD: CPT | Mod: CPTII,S$GLB,, | Performed by: INTERNAL MEDICINE

## 2023-02-15 PROCEDURE — 1159F PR MEDICATION LIST DOCUMENTED IN MEDICAL RECORD: ICD-10-PCS | Mod: CPTII,S$GLB,, | Performed by: INTERNAL MEDICINE

## 2023-02-15 PROCEDURE — 3075F PR MOST RECENT SYSTOLIC BLOOD PRESS GE 130-139MM HG: ICD-10-PCS | Mod: CPTII,S$GLB,, | Performed by: INTERNAL MEDICINE

## 2023-02-15 NOTE — PROGRESS NOTES
Subjective:       Patient ID: Cydney Salazar is a 63 y.o. female.    Chief Complaint: Annual Exam    HPI  63 y.o. Female here for annual exam.      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  No date: Cataract  No date: Glaucoma  No date: Hypertension  No date: Obesity  No date: Prediabetes  No date: Sarcoidosis      Comment:  in remission  No date: Sleep apnea      Comment:  uses cpap  Past Surgical History:  No date:  SECTION  2018: EMBOLIZATION; N/A      Comment:  Procedure: Embolization;  Surgeon: Whit Surgeon;                 Location: [facility];  Service: Radiology;  Laterality:                N/A;  3/27/2019: EMBOLIZATION; N/A      Comment:  Procedure: EMBOLIZATION, BLOOD VESSEL-AVM;  Surgeon:                Whit Surgeon;  Location: [facility];  Service:                Anesthesiology;  Laterality: N/A;  Room 189/[facility]  No date: ir embolization of pulmonary AVM  Social History    Socioeconomic History      Marital status:       Spouse name: Not on file      Number of children: Not on file      Years of education: Not on file      Highest education level: Not on file    Occupational History      Not on file    Tobacco Use      Smoking status: Never Smoker      Smokeless tobacco: Never Used      Tobacco comment:  smokes, usually not in the home    Substance and Sexual Activity      Alcohol use: Yes        Comment: rarely      Drug use: No      Sexual activity: Yes        Partners: Male        Birth control/protection: Post-menopausal, None    Other Topics      Concerns:        Not on file    Social History Narrative      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     Social Determinants of  Health  Financial Resource Strain:       Difficulty of Paying Living Expenses: Not on file  Food Insecurity:       Worried About Running Out of Food in the Last Year: Not on file      Ran Out of Food in the Last Year: Not on file  Transportation Needs:       Lack of Transportation (Medical): Not on file      Lack of Transportation (Non-Medical): Not on file  Physical Activity:       Days of Exercise per Week: Not on file      Minutes of Exercise per Session: Not on file  Stress:       Feeling of Stress : Not on file  Social Connections:       Frequency of Communication with Friends and Family: Not on file      Frequency of Social Gatherings with Friends and Family: Not on file      Attends [employer] Services: Not on file      Active Member of Clubs or Organizations: Not on file      Attends Club or Organization Meetings: Not on file      Marital Status: Not on file  Review of patient's allergies indicates:  No Known Allergies  Review of Systems   Constitutional:  Negative for activity change, appetite change, chills, diaphoresis, fatigue, fever and unexpected weight change.   HENT:  Negative for nasal congestion, mouth sores, postnasal drip, rhinorrhea, sinus pressure/congestion, sneezing, sore throat, trouble swallowing and voice change.    Eyes:  Negative for pain, discharge and visual disturbance.   Respiratory:  Negative for cough, shortness of breath and wheezing.    Cardiovascular:  Negative for chest pain, palpitations and leg swelling.   Gastrointestinal:  Negative for abdominal pain, blood in stool, constipation, diarrhea, nausea and vomiting.   Endocrine: Negative for cold intolerance and heat intolerance.   Genitourinary:  Negative for difficulty urinating, dysuria, frequency, hematuria and urgency.   Musculoskeletal:  Negative for arthralgias and myalgias.   Integumentary:  Negative for rash and wound.   Allergic/Immunologic: Negative for environmental allergies and food allergies.   Neurological:   Negative for dizziness, tremors, seizures, syncope, weakness, light-headedness and headaches.   Hematological:  Negative for adenopathy. Does not bruise/bleed easily.   Psychiatric/Behavioral:  Negative for confusion and sleep disturbance. The patient is not nervous/anxious.        Objective:      Physical Exam  Vitals and nursing note reviewed.   Constitutional:       General: She is not in acute distress.     Appearance: Normal appearance. She is well-developed. She is not diaphoretic.   HENT:      Head: Normocephalic and atraumatic.      Right Ear: External ear normal.      Left Ear: External ear normal.      Nose: Nose normal.      Mouth/Throat:      Pharynx: No oropharyngeal exudate.   Eyes:      General: No scleral icterus.        Right eye: No discharge.         Left eye: No discharge.      Conjunctiva/sclera: Conjunctivae normal.      Pupils: Pupils are equal, round, and reactive to light.   Neck:      Thyroid: No thyromegaly.      Vascular: No JVD.   Cardiovascular:      Rate and Rhythm: Normal rate and regular rhythm.      Pulses: Normal pulses.      Heart sounds: Normal heart sounds. No murmur heard.  Pulmonary:      Effort: Pulmonary effort is normal. No respiratory distress.      Breath sounds: Normal breath sounds. No wheezing, rhonchi or rales.   Chest:      Chest wall: No tenderness.   Abdominal:      General: Bowel sounds are normal. There is no distension.      Palpations: Abdomen is soft.      Tenderness: There is no abdominal tenderness. There is no guarding or rebound.   Musculoskeletal:      Cervical back: Neck supple.      Right lower leg: No edema.      Left lower leg: No edema.   Lymphadenopathy:      Cervical: No cervical adenopathy.   Skin:     General: Skin is warm and dry.      Coloration: Skin is not pale.      Findings: No rash.   Neurological:      General: No focal deficit present.      Mental Status: She is alert and oriented to person, place, and time.      Gait: Gait normal.    Psychiatric:         Behavior: Behavior normal.         Thought Content: Thought content normal.         Judgment: Judgment normal.       Assessment:       Problem List Items Addressed This Visit          Pulmonary    Sarcoidosis of lung - Primary    CRLD (chronic restrictive lung disease)       Cardiac/Vascular    Hyperlipidemia    Essential hypertension       Endocrine    Severe obesity (BMI 35.0-35.9 with comorbidity)    Pre-diabetes       Other    Obesity hypoventilation syndrome     Other Visit Diagnoses       Encounter for screening mammogram for breast cancer        Relevant Orders    Mammo Digital Screening Bilat w/ Raman    Annual physical exam        Relevant Orders    (In Office Administered) Pneumococcal Conjugate Vaccine (20 Valent) (IM)    Pain of finger of right hand        Relevant Orders    Ambulatory referral/consult to Orthopedics            Plan:    Blood work reviewed with pt     HTN- stable on current meds      Severe obesity- diet/exercise stressed       HLD- continue Lipitor 40 mg daily       Pulmonary sarcoidosis- followed by Pulm       KAREY- stable on CPAP      Prediabetes- last HA1C of 6.1(2/23)<--6.0(8/22)<--6.0(2/22)<--5.9(8/21)<--6.1(1/21)      F/u in 6 months

## 2023-02-18 VITALS
OXYGEN SATURATION: 95 % | SYSTOLIC BLOOD PRESSURE: 138 MMHG | BODY MASS INDEX: 36.15 KG/M2 | WEIGHT: 217.25 LBS | HEART RATE: 71 BPM | TEMPERATURE: 97 F | DIASTOLIC BLOOD PRESSURE: 78 MMHG | RESPIRATION RATE: 14 BRPM

## 2023-02-20 NOTE — PROGRESS NOTES
ESTABLISHED PATIENT VISIT (ADEOLA Wood 2021:)    Cc: KAREY  Patient of CASSIA Wood's  Here today for brief follow-up.    Since last visit:  Received new CPAP machine ([employer])  Her prior machine was recalled    From machine 2..23: 25/30 days x 9.6 hours, 15-20 (17.2), leak 28lpm, AHI 0.8    Assesement:  KAREY (very severe)      Plan:  Continue auto  15-20cwp      NOTE FROM PATIENT'S LAST VISIT:          Cydney Salazar returns today for mgt of KAREY    Annual visit. Using her machine nightly. Not aware of recall. Snoring resolved and sleep consolidated. Wants new machine still (by time ready to get it last year, order had ).     CPAP interrogation: AHI 0.7, avg 8:24h/night. 0 leak. 0% PB. 90% tile 16.8cm  Didn't take 1 of her Bp meds today, its been re-ordered, lot of stress helping dad with cancer and  on disability/low mood      BASELINE SLEEP STUDY 7/25/15: .7 of 83%; optimal cpap of 14 cm H20, split titration was limited by lack of supine REM sleep.          PHYSICAL EXAM:  LMP  (LMP Unknown)   GENERAL: Normal development, well groomed; Obesity      IMPRESSION:    1. Obstructive sleep apnea, very severe. Excellent adherence with PAP, benefiting from therapy . AHI<5. Eligible new machine     PLAN:     1GET new machineCPAP 15  20 cm rtc b/t 31-90d after setup adherence. Also register her recalled machine  2.  Discussed effectiveness of her therapy and potential ramifications of untreated KAREY, including heart disease, hypertension, cognitive difficulties, stroke, and diabetes.    See pcp re: HTN mgt/continue meds

## 2023-02-22 ENCOUNTER — OFFICE VISIT (OUTPATIENT)
Dept: SLEEP MEDICINE | Facility: CLINIC | Age: 63
End: 2023-02-22
Payer: COMMERCIAL

## 2023-02-22 VITALS
SYSTOLIC BLOOD PRESSURE: 179 MMHG | DIASTOLIC BLOOD PRESSURE: 78 MMHG | BODY MASS INDEX: 35.94 KG/M2 | HEART RATE: 61 BPM | WEIGHT: 216 LBS

## 2023-02-22 DIAGNOSIS — G47.33 SEVERE OBSTRUCTIVE SLEEP APNEA: Primary | ICD-10-CM

## 2023-02-22 PROCEDURE — 99999 PR PBB SHADOW E&M-EST. PATIENT-LVL III: CPT | Mod: PBBFAC,,, | Performed by: INTERNAL MEDICINE

## 2023-02-22 PROCEDURE — 3060F PR POS MICROALBUMINURIA RESULT DOCUMENTED/REVIEW: ICD-10-PCS | Mod: CPTII,S$GLB,, | Performed by: INTERNAL MEDICINE

## 2023-02-22 PROCEDURE — 3078F DIAST BP <80 MM HG: CPT | Mod: CPTII,S$GLB,, | Performed by: INTERNAL MEDICINE

## 2023-02-22 PROCEDURE — 1159F MED LIST DOCD IN RCRD: CPT | Mod: CPTII,S$GLB,, | Performed by: INTERNAL MEDICINE

## 2023-02-22 PROCEDURE — 3077F SYST BP >= 140 MM HG: CPT | Mod: CPTII,S$GLB,, | Performed by: INTERNAL MEDICINE

## 2023-02-22 PROCEDURE — 3060F POS MICROALBUMINURIA REV: CPT | Mod: CPTII,S$GLB,, | Performed by: INTERNAL MEDICINE

## 2023-02-22 PROCEDURE — 99213 OFFICE O/P EST LOW 20 MIN: CPT | Mod: S$GLB,,, | Performed by: INTERNAL MEDICINE

## 2023-02-22 PROCEDURE — 3077F PR MOST RECENT SYSTOLIC BLOOD PRESSURE >= 140 MM HG: ICD-10-PCS | Mod: CPTII,S$GLB,, | Performed by: INTERNAL MEDICINE

## 2023-02-22 PROCEDURE — 3066F NEPHROPATHY DOC TX: CPT | Mod: CPTII,S$GLB,, | Performed by: INTERNAL MEDICINE

## 2023-02-22 PROCEDURE — 3066F PR DOCUMENTATION OF TREATMENT FOR NEPHROPATHY: ICD-10-PCS | Mod: CPTII,S$GLB,, | Performed by: INTERNAL MEDICINE

## 2023-02-22 PROCEDURE — 99213 PR OFFICE/OUTPT VISIT, EST, LEVL III, 20-29 MIN: ICD-10-PCS | Mod: S$GLB,,, | Performed by: INTERNAL MEDICINE

## 2023-02-22 PROCEDURE — 3044F HG A1C LEVEL LT 7.0%: CPT | Mod: CPTII,S$GLB,, | Performed by: INTERNAL MEDICINE

## 2023-02-22 PROCEDURE — 3008F BODY MASS INDEX DOCD: CPT | Mod: CPTII,S$GLB,, | Performed by: INTERNAL MEDICINE

## 2023-02-22 PROCEDURE — 1159F PR MEDICATION LIST DOCUMENTED IN MEDICAL RECORD: ICD-10-PCS | Mod: CPTII,S$GLB,, | Performed by: INTERNAL MEDICINE

## 2023-02-22 PROCEDURE — 99999 PR PBB SHADOW E&M-EST. PATIENT-LVL III: ICD-10-PCS | Mod: PBBFAC,,, | Performed by: INTERNAL MEDICINE

## 2023-02-22 PROCEDURE — 3044F PR MOST RECENT HEMOGLOBIN A1C LEVEL <7.0%: ICD-10-PCS | Mod: CPTII,S$GLB,, | Performed by: INTERNAL MEDICINE

## 2023-02-22 PROCEDURE — 3078F PR MOST RECENT DIASTOLIC BLOOD PRESSURE < 80 MM HG: ICD-10-PCS | Mod: CPTII,S$GLB,, | Performed by: INTERNAL MEDICINE

## 2023-02-22 PROCEDURE — 3008F PR BODY MASS INDEX (BMI) DOCUMENTED: ICD-10-PCS | Mod: CPTII,S$GLB,, | Performed by: INTERNAL MEDICINE

## 2023-03-01 ENCOUNTER — OFFICE VISIT (OUTPATIENT)
Dept: OBSTETRICS AND GYNECOLOGY | Facility: CLINIC | Age: 63
End: 2023-03-01
Attending: OBSTETRICS & GYNECOLOGY
Payer: COMMERCIAL

## 2023-03-01 VITALS
SYSTOLIC BLOOD PRESSURE: 144 MMHG | WEIGHT: 218.5 LBS | HEIGHT: 65 IN | DIASTOLIC BLOOD PRESSURE: 68 MMHG | BODY MASS INDEX: 36.4 KG/M2

## 2023-03-01 DIAGNOSIS — Z01.419 WELL WOMAN EXAM: Primary | ICD-10-CM

## 2023-03-01 PROCEDURE — 99396 PREV VISIT EST AGE 40-64: CPT | Mod: S$GLB,,, | Performed by: OBSTETRICS & GYNECOLOGY

## 2023-03-01 PROCEDURE — 99396 PR PREVENTIVE VISIT,EST,40-64: ICD-10-PCS | Mod: S$GLB,,, | Performed by: OBSTETRICS & GYNECOLOGY

## 2023-03-01 PROCEDURE — 88175 CYTOPATH C/V AUTO FLUID REDO: CPT | Performed by: OBSTETRICS & GYNECOLOGY

## 2023-03-01 PROCEDURE — 3077F PR MOST RECENT SYSTOLIC BLOOD PRESSURE >= 140 MM HG: ICD-10-PCS | Mod: CPTII,S$GLB,, | Performed by: OBSTETRICS & GYNECOLOGY

## 2023-03-01 PROCEDURE — 3008F BODY MASS INDEX DOCD: CPT | Mod: CPTII,S$GLB,, | Performed by: OBSTETRICS & GYNECOLOGY

## 2023-03-01 PROCEDURE — 3044F PR MOST RECENT HEMOGLOBIN A1C LEVEL <7.0%: ICD-10-PCS | Mod: CPTII,S$GLB,, | Performed by: OBSTETRICS & GYNECOLOGY

## 2023-03-01 PROCEDURE — 87624 HPV HI-RISK TYP POOLED RSLT: CPT | Performed by: OBSTETRICS & GYNECOLOGY

## 2023-03-01 PROCEDURE — 3066F PR DOCUMENTATION OF TREATMENT FOR NEPHROPATHY: ICD-10-PCS | Mod: CPTII,S$GLB,, | Performed by: OBSTETRICS & GYNECOLOGY

## 2023-03-01 PROCEDURE — 3066F NEPHROPATHY DOC TX: CPT | Mod: CPTII,S$GLB,, | Performed by: OBSTETRICS & GYNECOLOGY

## 2023-03-01 PROCEDURE — 3077F SYST BP >= 140 MM HG: CPT | Mod: CPTII,S$GLB,, | Performed by: OBSTETRICS & GYNECOLOGY

## 2023-03-01 PROCEDURE — 3044F HG A1C LEVEL LT 7.0%: CPT | Mod: CPTII,S$GLB,, | Performed by: OBSTETRICS & GYNECOLOGY

## 2023-03-01 PROCEDURE — 3008F PR BODY MASS INDEX (BMI) DOCUMENTED: ICD-10-PCS | Mod: CPTII,S$GLB,, | Performed by: OBSTETRICS & GYNECOLOGY

## 2023-03-01 PROCEDURE — 1160F RVW MEDS BY RX/DR IN RCRD: CPT | Mod: CPTII,S$GLB,, | Performed by: OBSTETRICS & GYNECOLOGY

## 2023-03-01 PROCEDURE — 3060F PR POS MICROALBUMINURIA RESULT DOCUMENTED/REVIEW: ICD-10-PCS | Mod: CPTII,S$GLB,, | Performed by: OBSTETRICS & GYNECOLOGY

## 2023-03-01 PROCEDURE — 99999 PR PBB SHADOW E&M-EST. PATIENT-LVL III: ICD-10-PCS | Mod: PBBFAC,,, | Performed by: OBSTETRICS & GYNECOLOGY

## 2023-03-01 PROCEDURE — 1159F MED LIST DOCD IN RCRD: CPT | Mod: CPTII,S$GLB,, | Performed by: OBSTETRICS & GYNECOLOGY

## 2023-03-01 PROCEDURE — 99999 PR PBB SHADOW E&M-EST. PATIENT-LVL III: CPT | Mod: PBBFAC,,, | Performed by: OBSTETRICS & GYNECOLOGY

## 2023-03-01 PROCEDURE — 1159F PR MEDICATION LIST DOCUMENTED IN MEDICAL RECORD: ICD-10-PCS | Mod: CPTII,S$GLB,, | Performed by: OBSTETRICS & GYNECOLOGY

## 2023-03-01 PROCEDURE — 3078F DIAST BP <80 MM HG: CPT | Mod: CPTII,S$GLB,, | Performed by: OBSTETRICS & GYNECOLOGY

## 2023-03-01 PROCEDURE — 3078F PR MOST RECENT DIASTOLIC BLOOD PRESSURE < 80 MM HG: ICD-10-PCS | Mod: CPTII,S$GLB,, | Performed by: OBSTETRICS & GYNECOLOGY

## 2023-03-01 PROCEDURE — 3060F POS MICROALBUMINURIA REV: CPT | Mod: CPTII,S$GLB,, | Performed by: OBSTETRICS & GYNECOLOGY

## 2023-03-01 PROCEDURE — 1160F PR REVIEW ALL MEDS BY PRESCRIBER/CLIN PHARMACIST DOCUMENTED: ICD-10-PCS | Mod: CPTII,S$GLB,, | Performed by: OBSTETRICS & GYNECOLOGY

## 2023-03-01 NOTE — PROGRESS NOTES
CC: Well woman exam    Cydney Salazar is a 63 y.o. female  presents for well woman exam.  LMP: No LMP recorded (lmp unknown). Patient is postmenopausal..  No gyn issues, problems, or complaints.      Patient is postmenopausal..  No gyn issues, problems, or complaints.        age 49.  No HRT      pap & HPV negative  No history of abnormal pap.  MMG & colonoscopy scheduled    Past Medical History:   Diagnosis Date    Arteriovenous malformation (AVM) 2016    right middle lobe    Cataract     Colon polyps 2013    Glaucoma     Hypertension     Obesity     Prediabetes     Sarcoidosis     in remission    Sleep apnea     uses cpap     Past Surgical History:   Procedure Laterality Date     SECTION      EMBOLIZATION N/A 2018    Procedure: Embolization;  Surgeon: Whit Surgeon;  Location: [facility];  Service: Radiology;  Laterality: N/A;    EMBOLIZATION N/A 3/27/2019    Procedure: EMBOLIZATION, BLOOD VESSEL-AVM;  Surgeon: Whit Surgeon;  Location: [facility];  Service: Anesthesiology;  Laterality: N/A;  Room 189/[facility]    ir embolization of pulmonary AVM       Social History     Socioeconomic History    Marital status:    Tobacco Use    Smoking status: Never    Smokeless tobacco: Never    Tobacco comments:      smokes, usually not in the home   Substance and Sexual Activity    Alcohol use: Yes     Comment: rarely    Drug use: No    Sexual activity: Yes     Partners: Male     Birth control/protection: Post-menopausal, None   Social History Narrative    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     Family History   Problem Relation Age of Onset    Glaucoma Mother     Hyperlipidemia Mother     Diabetes Daughter     Stroke Father     Diabetes Father     Hyperlipidemia Father     No Known Problems Sister     No Known Problems Brother     No Known Problems Maternal Aunt     No Known Problems Maternal Uncle     No Known Problems Paternal Aunt     No Known Problems Paternal Uncle     No  "Known Problems Maternal Grandmother     No Known Problems Maternal Grandfather     No Known Problems Paternal Grandmother     No Known Problems Paternal Grandfather     Amblyopia Neg Hx     Blindness Neg Hx     Cancer Neg Hx     Cataracts Neg Hx     Hypertension Neg Hx     Macular degeneration Neg Hx     Retinal detachment Neg Hx     Strabismus Neg Hx     Thyroid disease Neg Hx     Breast cancer Neg Hx     Colon cancer Neg Hx     Ovarian cancer Neg Hx      OB History          1    Para   1    Term   0       0    AB   0    Living   1         SAB   0    IAB   0    Ectopic   0    Multiple   0    Live Births   1                 BP (!) 144/68   Ht 5' 5" (1.651 m)   Wt 99.1 kg (218 lb 7.6 oz)   LMP  (LMP Unknown)   BMI 36.36 kg/m²       ROS:  GENERAL: Denies weight gain or weight loss. Feeling well overall.   SKIN: Denies rash or lesions.   HEAD: Denies head injury or headache.   NODES: Denies enlarged lymph nodes.   CHEST: Denies chest pain or shortness of breath.   CARDIOVASCULAR: Denies palpitations or left sided chest pain.   ABDOMEN: No abdominal pain, constipation, diarrhea, nausea, vomiting or rectal bleeding.   URINARY: No frequency, dysuria, hematuria, or burning on urination.  REPRODUCTIVE: See HPI.   BREASTS: The patient performs breast self-examination and denies pain, lumps, or nipple discharge.   HEMATOLOGIC: No easy bruisability or excessive bleeding.   MUSCULOSKELETAL: Denies joint pain or swelling.   NEUROLOGIC: Denies syncope or weakness.   PSYCHIATRIC: Denies depression, anxiety or mood swings.    PHYSICAL EXAM:  APPEARANCE: Well nourished, well developed, in no acute distress.  AFFECT: WNL, alert and oriented x 3  SKIN: No acne or hirsutism  NECK: Neck symmetric without masses or thyromegaly  NODES: No inguinal, cervical, axillary, or femoral lymph node enlargement  CHEST: Good respiratory effect  ABDOMEN: Soft.  No tenderness or masses.  No hepatosplenomegaly.  No hernias.  BREASTS: " Symmetrical, no skin changes or visible lesions.  No palpable masses, nipple discharge bilaterally.  PELVIC: Normal external genitalia without lesions.  Normal hair distribution.  Adequate perineal body, normal urethral meatus.  Vagina moist and well rugated without lesions or discharge.  Cervix pink, without lesions, discharge or tenderness.  No significant cystocele or rectocele.  Bimanual exam shows uterus to be normal size, regular, mobile and nontender.  Adnexa without masses or tenderness.    EXTREMITIES: No edema.      ASSESSMENT & PLAN    ICD-10-CM ICD-9-CM    1. Well woman exam  Z01.419 V72.31 Liquid-Based Pap Smear, Screening      HPV High Risk Genotypes, PCR             Patient was counseled today on A.C.S. Pap guidelines and recommendations for yearly pelvic exams, mammograms and monthly self breast exams; to see her PCP for other health maintenance.

## 2023-03-07 LAB
FINAL PATHOLOGIC DIAGNOSIS: NORMAL
HPV HR 12 DNA SPEC QL NAA+PROBE: NEGATIVE
HPV16 AG SPEC QL: NEGATIVE
HPV18 DNA SPEC QL NAA+PROBE: NEGATIVE
Lab: NORMAL

## 2023-03-15 ENCOUNTER — OFFICE VISIT (OUTPATIENT)
Dept: OPTOMETRY | Facility: CLINIC | Age: 63
End: 2023-03-15
Payer: COMMERCIAL

## 2023-03-15 ENCOUNTER — CLINICAL SUPPORT (OUTPATIENT)
Dept: OPHTHALMOLOGY | Facility: CLINIC | Age: 63
End: 2023-03-15
Payer: COMMERCIAL

## 2023-03-15 DIAGNOSIS — H52.03 HYPEROPIA WITH PRESBYOPIA OF BOTH EYES: ICD-10-CM

## 2023-03-15 DIAGNOSIS — H25.13 NUCLEAR SCLEROSIS OF BOTH EYES: ICD-10-CM

## 2023-03-15 DIAGNOSIS — Z13.5 SCREENING FOR EYE CONDITION: ICD-10-CM

## 2023-03-15 DIAGNOSIS — H40.1121 PRIMARY OPEN ANGLE GLAUCOMA (POAG) OF LEFT EYE, MILD STAGE: ICD-10-CM

## 2023-03-15 DIAGNOSIS — D86.9 SARCOIDOSIS: ICD-10-CM

## 2023-03-15 DIAGNOSIS — H52.4 HYPEROPIA WITH PRESBYOPIA OF BOTH EYES: ICD-10-CM

## 2023-03-15 DIAGNOSIS — H40.053 OCULAR HYPERTENSION, BILATERAL: Primary | ICD-10-CM

## 2023-03-15 PROCEDURE — 3044F PR MOST RECENT HEMOGLOBIN A1C LEVEL <7.0%: ICD-10-PCS | Mod: CPTII,S$GLB,, | Performed by: OPTOMETRIST

## 2023-03-15 PROCEDURE — 92015 DETERMINE REFRACTIVE STATE: CPT | Mod: S$GLB,,, | Performed by: OPTOMETRIST

## 2023-03-15 PROCEDURE — 92083 HUMPHREY VISUAL FIELD - OU - BOTH EYES: ICD-10-PCS | Mod: S$GLB,,, | Performed by: OPTOMETRIST

## 2023-03-15 PROCEDURE — 92083 EXTENDED VISUAL FIELD XM: CPT | Mod: S$GLB,,, | Performed by: OPTOMETRIST

## 2023-03-15 PROCEDURE — 1159F MED LIST DOCD IN RCRD: CPT | Mod: CPTII,S$GLB,, | Performed by: OPTOMETRIST

## 2023-03-15 PROCEDURE — 92015 PR REFRACTION: ICD-10-PCS | Mod: S$GLB,,, | Performed by: OPTOMETRIST

## 2023-03-15 PROCEDURE — 3060F POS MICROALBUMINURIA REV: CPT | Mod: CPTII,S$GLB,, | Performed by: OPTOMETRIST

## 2023-03-15 PROCEDURE — 99999 PR PBB SHADOW E&M-EST. PATIENT-LVL III: CPT | Mod: PBBFAC,,, | Performed by: OPTOMETRIST

## 2023-03-15 PROCEDURE — 3060F PR POS MICROALBUMINURIA RESULT DOCUMENTED/REVIEW: ICD-10-PCS | Mod: CPTII,S$GLB,, | Performed by: OPTOMETRIST

## 2023-03-15 PROCEDURE — 3066F NEPHROPATHY DOC TX: CPT | Mod: CPTII,S$GLB,, | Performed by: OPTOMETRIST

## 2023-03-15 PROCEDURE — 92014 COMPRE OPH EXAM EST PT 1/>: CPT | Mod: S$GLB,,, | Performed by: OPTOMETRIST

## 2023-03-15 PROCEDURE — 1159F PR MEDICATION LIST DOCUMENTED IN MEDICAL RECORD: ICD-10-PCS | Mod: CPTII,S$GLB,, | Performed by: OPTOMETRIST

## 2023-03-15 PROCEDURE — 92014 PR EYE EXAM, EST PATIENT,COMPREHESV: ICD-10-PCS | Mod: S$GLB,,, | Performed by: OPTOMETRIST

## 2023-03-15 PROCEDURE — 3044F HG A1C LEVEL LT 7.0%: CPT | Mod: CPTII,S$GLB,, | Performed by: OPTOMETRIST

## 2023-03-15 PROCEDURE — 3066F PR DOCUMENTATION OF TREATMENT FOR NEPHROPATHY: ICD-10-PCS | Mod: CPTII,S$GLB,, | Performed by: OPTOMETRIST

## 2023-03-15 PROCEDURE — 99999 PR PBB SHADOW E&M-EST. PATIENT-LVL III: ICD-10-PCS | Mod: PBBFAC,,, | Performed by: OPTOMETRIST

## 2023-03-15 NOTE — PATIENT INSTRUCTIONS
Prior diagnosis of bilateral ocular hypertension, and mild stage primary open angle glaucoma of the left eye.  Currently using Timolol Maleate 0.5% ophthalmic solution in both eyes two times per day for IOP control/reduction, in lieu of Combigan ophthalmic solution, as insurance will no longer pay for Combigan.     In today for review of repeat HVF test (24-2) done today, and for  recheck of IOP..  Refer to HVF test results in harmony.  Results appear reliable x 3 in each eye.  Taking results at face, no evidence of overtly glaucomatous visual field defect in the right eye, and early superior arcuate visual field defect in the left eye (consistent with early stage glaucoma)     Intraocular pressure within normal range in each eye today:  16 mm Hg n the right eye and 17 mm Hg in the left eye, with the use of Timolol Maleate 0.5% ophthalmic solution two times per day in both eyes.     Hyperopia in each eye, with very satisfactory best-corrected VA in each eye.  Presbyopia consistent with age.  New spectacle lens Rx issued for use as desired.      Recheck in six months, with repeat HVF 24-2 at that time.  Orders entered.  In the interim continue to instill one drop of Timolol Maleate 0.5% ophthalmic solution into each eye two times per day.

## 2023-03-15 NOTE — PROGRESS NOTES
HPI     eye examination             Comments: General eye examination and refraction.  Prior diagnosis of bilateral ocular hypertension, with mild stage POAG in   OS.  HVF test done in 03/2022, without evidence of overtly glaucomatous   visual field defect   Repeat HVF test done today   Nick Visual field (HVF) test done today.  No acute ocular/vision problems.           Comments    Patient's age: 63 y.o. female  Occupation: [employer]   Approximate date of last eye examination: 03/09/2022  Name of last eye doctor seen: Dr Romero   City/State: [facility]   Wears glasses? No     If yes, wears  Full-time or part-time?  Part time OTC readers  Present glasses are:   SV OTC Readers +2.00  Approximate age of present glasses:  n/a  Got new glasses following last exam, or subsequently?:  NO    Any problem with VA with glasses?  No problems  Wears CLs?:  No   Headaches?  No   Eye pain/discomfort?  No                                                                                     Flashes?  No   Floaters?  No   Diplopia/Double vision?  No   Patient's Ocular History:          Any eye surgeries? No          Any eye injury?  No          Any treatment for eye disease?  POAG   Family history of eye disease?  No   Significant patient medical history:         1. Diabetes?  No      If yes, IDDM or NIDDM? NA           2. HBP?  Yes, controlled by medication               ! OTC eyedrops currently using:  No    ! Prescription eye meds currently using: Timolol Maleate 0.5 % BID OU   ! Any history of allergy/adverse reaction to any eye meds used   previously?  No    ! Any history of allergy/adverse reaction to eyedrops used during prior   eye exam(s)? No    ! Any history of allergy/adverse reaction to Novacaine or similar meds?   No    ! Any history of allergy/adverse reaction to Epinephrine or similar meds?   No    ! Patient okay with use of anesthetic eyedrops to check eye pressure?    Yes        ! Patient okay with use of  "eyedrops to dilate pupils today?  Yes    !  Allergies/Medications/Medical History/Family History reviewed today?    Yes       PD =   65/61  Desired reading distance =  15.25"                              Last edited by Darrius Romero, OD on 3/15/2023 11:15 AM.            Assessment /Plan     For exam results, see Encounter Report.    1. Ocular hypertension, bilateral  Nick Visual Field - OU - Extended - Both Eyes    Nick Visual Field - OU - Extended - Both Eyes      2. Primary open angle glaucoma (POAG) of left eye, mild stage  Nick Visual Field - OU - Extended - Both Eyes    Nick Visual Field - OU - Extended - Both Eyes      3. Nuclear sclerosis of both eyes        4. Sarcoidosis        5. Screening for eye condition        6. Hyperopia with presbyopia of both eyes                     Prior diagnosis of bilateral ocular hypertension, and mild stage primary open angle glaucoma of the left eye.  Currently using Timolol Maleate 0.5% ophthalmic solution in both eyes two times per day for IOP control/reduction, in lieu of Combigan ophthalmic solution, as insurance will no longer pay for Combigan.     In today for review of repeat HVF test (24-2) done today, and for  recheck of IOP..  Refer to HVF test results in harmony.  Results appear reliable x 3 in each eye.  Taking results at face, no evidence of overtly glaucomatous visual field defect in the right eye, and early superior arcuate visual field defect in the left eye (consistent with early stage glaucoma)     Intraocular pressure within normal range in each eye today:  16 mm Hg n the right eye and 17 mm Hg in the left eye, with the use of Timolol Maleate 0.5% ophthalmic solution two times per day in both eyes.     Hyperopia in each eye, with very satisfactory best-corrected VA in each eye.  Presbyopia consistent with age.  New spectacle lens Rx issued for use as desired.      Recheck in six months, with repeat HVF 24-2 at that time.  Orders " entered.  In the interim continue to instill one drop of Timolol Maleate 0.5% ophthalmic solution into each eye two times per day.

## 2023-03-15 NOTE — PROGRESS NOTES
Visual field test done.  Patient stated no latex allergies used coverlet      Glasses Prescription     Sphere Cylinder Dist VA Add   Right +0.75 Sphere 20/20 +2.50   Left +0.50 Sphere 20/20 +2.50   Comments: Bifocal/trifocal/progressive, OR single vision

## 2023-03-21 ENCOUNTER — ANESTHESIA (OUTPATIENT)
Dept: ENDOSCOPY | Facility: HOSPITAL | Age: 63
End: 2023-03-21
Payer: COMMERCIAL

## 2023-03-21 ENCOUNTER — ANESTHESIA EVENT (OUTPATIENT)
Dept: ENDOSCOPY | Facility: HOSPITAL | Age: 63
End: 2023-03-21
Payer: COMMERCIAL

## 2023-03-21 ENCOUNTER — HOSPITAL ENCOUNTER (OUTPATIENT)
Facility: HOSPITAL | Age: 63
Discharge: HOME | End: 2023-03-21
Attending: INTERNAL MEDICINE | Admitting: INTERNAL MEDICINE
Payer: COMMERCIAL

## 2023-03-21 VITALS
HEIGHT: 65 IN | OXYGEN SATURATION: 93 % | HEART RATE: 62 BPM | BODY MASS INDEX: 36.32 KG/M2 | RESPIRATION RATE: 20 BRPM | DIASTOLIC BLOOD PRESSURE: 74 MMHG | TEMPERATURE: 98 F | WEIGHT: 218 LBS | SYSTOLIC BLOOD PRESSURE: 177 MMHG

## 2023-03-21 DIAGNOSIS — Z12.11 COLON CANCER SCREENING: ICD-10-CM

## 2023-03-21 DIAGNOSIS — Z12.11 SPECIAL SCREENING FOR MALIGNANT NEOPLASMS, COLON: Primary | ICD-10-CM

## 2023-03-21 PROCEDURE — 88305 TISSUE EXAM BY PATHOLOGIST: CPT | Mod: 26,,, | Performed by: PATHOLOGY

## 2023-03-21 PROCEDURE — 27201089 HC SNARE, DISP (ANY): Performed by: INTERNAL MEDICINE

## 2023-03-21 PROCEDURE — 37000009 HC ANESTHESIA EA ADD 15 MINS: Performed by: INTERNAL MEDICINE

## 2023-03-21 PROCEDURE — 27000221 HC OXYGEN, UP TO 24 HOURS

## 2023-03-21 PROCEDURE — 45385 COLONOSCOPY W/LESION REMOVAL: CPT | Mod: 33,,, | Performed by: INTERNAL MEDICINE

## 2023-03-21 PROCEDURE — D9220A PRA ANESTHESIA: Mod: 33,ANES,, | Performed by: ANESTHESIOLOGY

## 2023-03-21 PROCEDURE — 45385 COLONOSCOPY W/LESION REMOVAL: CPT | Mod: PT | Performed by: INTERNAL MEDICINE

## 2023-03-21 PROCEDURE — 88305 TISSUE EXAM BY PATHOLOGIST: ICD-10-PCS | Mod: 26,,, | Performed by: PATHOLOGY

## 2023-03-21 PROCEDURE — 37000008 HC ANESTHESIA 1ST 15 MINUTES: Performed by: INTERNAL MEDICINE

## 2023-03-21 PROCEDURE — 63600175 PHARM REV CODE 636 W HCPCS: Performed by: NURSE ANESTHETIST, CERTIFIED REGISTERED

## 2023-03-21 PROCEDURE — D9220A PRA ANESTHESIA: ICD-10-PCS | Mod: 33,ANES,, | Performed by: ANESTHESIOLOGY

## 2023-03-21 PROCEDURE — 27201012 HC FORCEPS, HOT/COLD, DISP: Performed by: INTERNAL MEDICINE

## 2023-03-21 PROCEDURE — 45385 PR COLONOSCOPY,REMV LESN,SNARE: ICD-10-PCS | Mod: 33,,, | Performed by: INTERNAL MEDICINE

## 2023-03-21 PROCEDURE — 45380 PR COLONOSCOPY,BIOPSY: ICD-10-PCS | Mod: 33,59,, | Performed by: INTERNAL MEDICINE

## 2023-03-21 PROCEDURE — D9220A PRA ANESTHESIA: Mod: 33,CRNA,, | Performed by: NURSE ANESTHETIST, CERTIFIED REGISTERED

## 2023-03-21 PROCEDURE — 88305 TISSUE EXAM BY PATHOLOGIST: CPT | Mod: 59 | Performed by: PATHOLOGY

## 2023-03-21 PROCEDURE — 25000003 PHARM REV CODE 250: Performed by: NURSE ANESTHETIST, CERTIFIED REGISTERED

## 2023-03-21 PROCEDURE — 94761 N-INVAS EAR/PLS OXIMETRY MLT: CPT

## 2023-03-21 PROCEDURE — 25000003 PHARM REV CODE 250: Performed by: INTERNAL MEDICINE

## 2023-03-21 PROCEDURE — D9220A PRA ANESTHESIA: ICD-10-PCS | Mod: 33,CRNA,, | Performed by: NURSE ANESTHETIST, CERTIFIED REGISTERED

## 2023-03-21 PROCEDURE — 45380 COLONOSCOPY AND BIOPSY: CPT | Mod: 33,59,, | Performed by: INTERNAL MEDICINE

## 2023-03-21 PROCEDURE — 45380 COLONOSCOPY AND BIOPSY: CPT | Mod: PT,59 | Performed by: INTERNAL MEDICINE

## 2023-03-21 RX ORDER — LIDOCAINE HYDROCHLORIDE 20 MG/ML
INJECTION, SOLUTION EPIDURAL; INFILTRATION; INTRACAUDAL; PERINEURAL
Status: DISCONTINUED | OUTPATIENT
Start: 2023-03-21 | End: 2023-03-21

## 2023-03-21 RX ORDER — PHENYLEPHRINE HCL IN 0.9% NACL 1 MG/10 ML
SYRINGE (ML) INTRAVENOUS
Status: DISCONTINUED | OUTPATIENT
Start: 2023-03-21 | End: 2023-03-21

## 2023-03-21 RX ORDER — SODIUM CHLORIDE 0.9 % (FLUSH) 0.9 %
3 SYRINGE (ML) INJECTION
Status: DISCONTINUED | OUTPATIENT
Start: 2023-03-21 | End: 2023-03-21 | Stop reason: HOSPADM

## 2023-03-21 RX ORDER — SUCCINYLCHOLINE CHLORIDE 20 MG/ML
INJECTION INTRAMUSCULAR; INTRAVENOUS
Status: DISCONTINUED | OUTPATIENT
Start: 2023-03-21 | End: 2023-03-21

## 2023-03-21 RX ORDER — PROPOFOL 10 MG/ML
VIAL (ML) INTRAVENOUS
Status: DISCONTINUED | OUTPATIENT
Start: 2023-03-21 | End: 2023-03-21

## 2023-03-21 RX ORDER — ONDANSETRON 2 MG/ML
INJECTION INTRAMUSCULAR; INTRAVENOUS
Status: DISCONTINUED | OUTPATIENT
Start: 2023-03-21 | End: 2023-03-21

## 2023-03-21 RX ORDER — SODIUM CHLORIDE 9 MG/ML
INJECTION, SOLUTION INTRAVENOUS CONTINUOUS
Status: DISCONTINUED | OUTPATIENT
Start: 2023-03-21 | End: 2023-03-21 | Stop reason: HOSPADM

## 2023-03-21 RX ORDER — PROCHLORPERAZINE EDISYLATE 5 MG/ML
5 INJECTION INTRAMUSCULAR; INTRAVENOUS EVERY 30 MIN PRN
Status: DISCONTINUED | OUTPATIENT
Start: 2023-03-21 | End: 2023-03-21 | Stop reason: HOSPADM

## 2023-03-21 RX ADMIN — ONDANSETRON 4 MG: 2 INJECTION INTRAMUSCULAR; INTRAVENOUS at 08:03

## 2023-03-21 RX ADMIN — LIDOCAINE HYDROCHLORIDE 60 MG: 20 INJECTION, SOLUTION EPIDURAL; INFILTRATION; INTRACAUDAL at 08:03

## 2023-03-21 RX ADMIN — SODIUM CHLORIDE: 0.9 INJECTION, SOLUTION INTRAVENOUS at 08:03

## 2023-03-21 RX ADMIN — PROPOFOL 150 MG: 10 INJECTION, EMULSION INTRAVENOUS at 08:03

## 2023-03-21 RX ADMIN — GLYCOPYRROLATE 0.2 MG: 0.2 INJECTION INTRAMUSCULAR; INTRAVENOUS at 08:03

## 2023-03-21 RX ADMIN — Medication 100 MCG: at 08:03

## 2023-03-21 RX ADMIN — LIDOCAINE HYDROCHLORIDE 40 MG: 20 INJECTION, SOLUTION EPIDURAL; INFILTRATION; INTRACAUDAL at 08:03

## 2023-03-21 RX ADMIN — PROPOFOL 50 MG: 10 INJECTION, EMULSION INTRAVENOUS at 08:03

## 2023-03-21 RX ADMIN — SUCCINYLCHOLINE CHLORIDE 140 MG: 20 INJECTION, SOLUTION INTRAMUSCULAR; INTRAVENOUS; PARENTERAL at 08:03

## 2023-03-21 NOTE — ANESTHESIA POSTPROCEDURE EVALUATION
Anesthesia Post Evaluation    Patient: Cydney Salazar    Procedure(s) Performed: Procedure(s) (LRB):  COLONOSCOPY (N/A)    Final Anesthesia Type: general      Patient location during evaluation: PACU  Patient participation: Yes- Able to Participate  Level of consciousness: awake and alert and awake  Post-procedure vital signs: reviewed and stable  Pain management: adequate  Airway patency: patent    PONV status at discharge: No PONV  Anesthetic complications: no      Cardiovascular status: blood pressure returned to baseline  Respiratory status: unassisted and spontaneous ventilation  Hydration status: euvolemic  Follow-up not needed.          Vitals Value Taken Time   /59 03/21/23 0918   Temp 36.3 °C (97.3 °F) 03/21/23 0850   Pulse 52 03/21/23 0935   Resp 16 03/21/23 0935   SpO2 97 % 03/21/23 0935   Vitals shown include unvalidated device data.      No case tracking events are documented in the log.      Pain/Blake Score: Blake Score: 8 (3/21/2023  9:18 AM)

## 2023-03-21 NOTE — PROGRESS NOTES
Patient discharged to home. Discharge instructions complete. Patient states complete understanding and has no further questions. IV removed. Catheter intact. Pressure and dressing applied. Patient transported via wheelchair with PCT and belonging to front of Hospital to meet daughter.

## 2023-03-21 NOTE — ANESTHESIA PREPROCEDURE EVALUATION
03/21/2023  Cydney Salazar is a 63 y.o., female.    Patient Active Problem List   Diagnosis    Sarcoidosis    Hyperlipidemia    Osteopenia    KAREY (obstructive sleep apnea)    Pre-diabetes    Essential hypertension    Arterio-venous malformation    Arteriovenous malformation    CRLD (chronic restrictive lung disease)    AVM (arteriovenous malformation)    Pleurisy    Pulmonary infiltrates on CXR    Pulmonary arteriovenous malformation    Severe obesity (BMI 35.0-35.9 with comorbidity)    Sarcoidosis of lung    Obesity hypoventilation syndrome           Pre-op Assessment    I have reviewed the Patient Summary Reports.     I have reviewed the Nursing Notes. I have reviewed the NPO Status.   I have reviewed the Medications.     Review of Systems      Physical Exam  General: Well nourished    Airway:  Mallampati: II / I/ II  Mouth Opening: Normal  TM Distance: Normal  Tongue: Normal  Neck ROM: Normal ROM    Dental:  Intact        Anesthesia Plan  Type of Anesthesia, risks & benefits discussed:    Anesthesia Type: Gen ETT  Intra-op Monitoring Plan: Standard ASA Monitors  Post Op Pain Control Plan: multimodal analgesia and IV/PO Opioids PRN  Induction:  IV  Airway Plan: Direct, Post-Induction  Informed Consent: Informed consent signed with the Patient and all parties understand the risks and agree with anesthesia plan.  All questions answered. Patient consented to blood products? Yes  ASA Score: 4  Day of Surgery Review of History & Physical: H&P Update referred to the surgeon/provider.    Ready For Surgery From Anesthesia Perspective.     .    Results for orders placed during the hospital encounter of 01/28/19    Transthoracic echo (TTE) complete (Cupid Only)    Interpretation Summary  · Normal left ventricular systolic function. The estimated ejection fraction is 60%  · Indeterminate left  ventricular diastolic function.  · Mild tricuspid regurgitation. The estimated PA systolic pressure is 60 mm Hg  · Mild mitral regurgitation.  · Normal central venous pressure (3 mm Hg).  · Normal right ventricular systolic function.  · No shunting noted with agitated saline

## 2023-03-21 NOTE — TRANSFER OF CARE
"Anesthesia Transfer of Care Note    Patient: Cydney Salazar    Procedure(s) Performed: Procedure(s) (LRB):  COLONOSCOPY (N/A)    Patient location: PACU    Anesthesia Type: general    Transport from OR: Transported from OR on 6-10 L/min O2 by face mask with adequate spontaneous ventilation    Post pain: adequate analgesia    Post assessment: no apparent anesthetic complications and tolerated procedure well    Post vital signs: stable    Level of consciousness: awake and alert    Nausea/Vomiting: no nausea/vomiting    Complications: none    Transfer of care protocol was followed      Last vitals:   Visit Vitals  /86 (BP Location: Left arm, Patient Position: Lying)   Pulse 61   Temp 36.6 °C (97.9 °F)   Resp 18   Ht 5' 5" (1.651 m)   Wt 98.9 kg (218 lb)   LMP  (LMP Unknown)   SpO2 96%   Breastfeeding No   BMI 36.28 kg/m²     "

## 2023-03-21 NOTE — PROVATION PATIENT INSTRUCTIONS
Discharge Summary/Instructions after an Endoscopic Procedure  Patient Name: Cydney Salazar  Patient MRN: [id number]  Patient YOB: 1960 Tuesday, March 21, 2023  Popeye Lopez MD  Dear patient,  As a result of recent federal legislation (The Federal Cures Act), you may   receive lab or pathology results from your procedure in your [facility]   account before your physician is able to contact you. Your physician or   their representative will relay the results to you with their   recommendations at their soonest availability.  Thank you,  RESTRICTIONS:  During your procedure today, you received medications for sedation.  These   medications may affect your judgment, balance and coordination.  Therefore,   for 24 hours, you have the following restrictions:   - DO NOT drive a car, operate machinery, make legal/financial decisions,   sign important papers or drink alcohol.    ACTIVITY:  Today: no heavy lifting, straining or running due to procedural   sedation/anesthesia.  The following day: return to full activity including work.  DIET:  Eat and drink normally unless instructed otherwise.     TREATMENT FOR COMMON SIDE EFFECTS:  - Mild abdominal pain, nausea, belching, bloating or excessive gas:  rest,   eat lightly and use a heating pad.  - Sore Throat: treat with throat lozenges and/or gargle with warm salt   water.  - Because air was used during the procedure, expelling large amounts of air   from your rectum or belching is normal.  - If a bowel prep was taken, you may not have a bowel movement for 1-3 days.    This is normal.  SYMPTOMS TO WATCH FOR AND REPORT TO YOUR PHYSICIAN:  1. Abdominal pain or bloating, other than gas cramps.  2. Chest pain.  3. Back pain.  4. Signs of infection such as: chills or fever occurring within 24 hours   after the procedure.  5. Rectal bleeding, which would show as bright red, maroon, or black stools.   (A tablespoon of blood from the rectum is not serious, especially if    hemorrhoids are present.)  6. Vomiting.  7. Weakness or dizziness.  GO DIRECTLY TO THE NEAREST EMERGENCY ROOM IF YOU HAVE ANY OF THE FOLLOWING:      Difficulty breathing              Chills and/or fever over 101 F   Persistent vomiting and/or vomiting blood   Severe abdominal pain   Severe chest pain   Black, tarry stools   Bleeding- more than one tablespoon   Any other symptom or condition that you feel may need urgent attention  Your doctor recommends these additional instructions:  If any biopsies were taken, your doctors clinic will contact you in 1 to 2   weeks with any results.  - Discharge patient to home.   - Resume previous diet.   - Continue present medications.   - Await pathology results.   - Repeat colonoscopy in 5 years for surveillance.   For questions, problems or results please call your physician - Popeye Lopez MD at Work:  [phone].  [facility], EMERGENCY ROOM PHONE NUMBER: [phone]  IF A COMPLICATION OR EMERGENCY SITUATION ARISES AND YOU ARE UNABLE TO REACH   YOUR PHYSICIAN - GO DIRECTLY TO THE EMERGENCY ROOM.  Popeye Lopez MD  3/21/2023 8:47:20 AM  This report has been verified and signed electronically.  Dear patient,  As a result of recent federal legislation (The Federal Cures Act), you may   receive lab or pathology results from your procedure in your [facility]   account before your physician is able to contact you. Your physician or   their representative will relay the results to you with their   recommendations at their soonest availability.  Thank you,  PROVATION

## 2023-03-21 NOTE — ANESTHESIA PROCEDURE NOTES
Intubation    Date/Time: 3/21/2023 8:05 AM  Performed by: Sapphire Abreu CRNA  Authorized by: Cesar Nichols MD     Intubation:     Induction:  Intravenous    Intubated:  Postinduction    Mask Ventilation:  Easy mask    Attempts:  1    Attempted By:  CRNA    Method of Intubation:  Video laryngoscopy    Blade:  Dubon 3    Laryngeal View Grade: Grade IIb - only the arytenoids and epiglottis seen      Difficult Airway Encountered?: No      Complications:  None    Airway Device:  Oral endotracheal tube    Airway Device Size:  7.0    Style/Cuff Inflation:  Cuffed (inflated to minimal occlusive pressure)    Tube secured:  21    Secured at:  The lips    Placement Verified By:  Capnometry    Complicating Factors:  None, small mouth, obesity and short neck    Findings Post-Intubation:  BS equal bilateral and atraumatic/condition of teeth unchanged

## 2023-03-21 NOTE — H&P
Short Stay Endoscopy History and Physical    PCP - Jasiel Middleton DO    Procedure - Colonoscopy  ASA - per anesthesia  Mallampati - per anesthesia  Plan of anesthesia - MAC    HPI:  This is a 63 y.o. female here for evaluation of : personal history of colon polyps    Medical History:  has a past medical history of Arteriovenous malformation (AVM) (2016), Cataract, Colon polyps (2013), Glaucoma, Hypertension, Obesity, Prediabetes, Sarcoidosis, and Sleep apnea.    Surgical History:  has a past surgical history that includes  section; ir embolization of pulmonary AVM; Embolization (N/A, 2018); and Embolization (N/A, 3/27/2019).    Family History: family history includes Diabetes in her daughter and father; Glaucoma in her mother; Hyperlipidemia in her father and mother; No Known Problems in her brother, maternal aunt, maternal grandfather, maternal grandmother, maternal uncle, paternal aunt, paternal grandfather, paternal grandmother, paternal uncle, and sister; Stroke in her father.. Otherwise no colon cancer, inflammatory bowel disease, or GI malignancies.    Social History:  reports that she has never smoked. She has never used smokeless tobacco. She reports current alcohol use. She reports that she does not use drugs.    Review of patient's allergies indicates:  No Known Allergies    Medications:   Medications Prior to Admission   Medication Sig Dispense Refill Last Dose    ascorbic acid, vitamin C, (VITAMIN C) 500 MG tablet Take 500 mg by mouth once daily.   3/20/2023    atenoloL (TENORMIN) 100 MG tablet Take 1 tablet (100 mg total) by mouth once daily. 90 tablet 3 3/21/2023    atorvastatin (LIPITOR) 40 MG tablet TAKE 1 TABLET(40 MG) BY MOUTH EVERY EVENING 90 tablet 1 3/20/2023    hydrALAZINE (APRESOLINE) 50 MG tablet TAKE 1 TABLET(50 MG) BY MOUTH THREE TIMES DAILY 270 tablet 3 3/21/2023    irbesartan-hydrochlorothiazide (AVALIDE) 300-12.5 mg per tablet Take 1 tablet by mouth every  morning 90 tablet 3 3/21/2023    timolol maleate 0.5% (TIMOPTIC) 0.5 % Drop Place 1 drop into both eyes 2 (two) times daily. 15 mL 2 3/21/2023         Vital Signs:   Vitals:    03/21/23 0735   BP: (!) 215/96   Pulse: 61   Resp: 18   Temp: 97.9 °F (36.6 °C)       Labs:  Lab Results   Component Value Date    WBC 5.45 02/08/2023    HGB 12.8 02/08/2023    HCT 42.8 02/08/2023     02/08/2023    CHOL 172 02/08/2023    TRIG 155 (H) 02/08/2023    HDL 41 02/08/2023    ALT 21 02/08/2023    AST 15 02/08/2023     02/08/2023    K 4.3 02/08/2023     02/08/2023    CREATININE 0.7 02/08/2023    BUN 15 02/08/2023    CO2 28 02/08/2023    TSH 1.934 02/08/2023    INR 0.9 03/27/2019    HGBA1C 6.1 (H) 02/08/2023       I have explained the risks and benefits of endoscopy procedures to the patient/their POA including but not limited to bleeding, perforation, infection, and death.  The patient/their POA was asked if they understand and allowed to ask any further questions to their satisfaction.    Gloria Palmer MD

## 2023-03-27 LAB
FINAL PATHOLOGIC DIAGNOSIS: NORMAL
GROSS: NORMAL
Lab: NORMAL

## 2023-03-28 ENCOUNTER — PATIENT MESSAGE (OUTPATIENT)
Dept: ORTHOPEDICS | Facility: CLINIC | Age: 63
End: 2023-03-28
Payer: COMMERCIAL

## 2023-03-28 ENCOUNTER — TELEPHONE (OUTPATIENT)
Dept: ORTHOPEDICS | Facility: CLINIC | Age: 63
End: 2023-03-28
Payer: COMMERCIAL

## 2023-03-28 DIAGNOSIS — M79.641 RIGHT HAND PAIN: Primary | ICD-10-CM

## 2023-03-29 ENCOUNTER — OFFICE VISIT (OUTPATIENT)
Dept: ORTHOPEDICS | Facility: CLINIC | Age: 63
End: 2023-03-29
Payer: COMMERCIAL

## 2023-03-29 ENCOUNTER — HOSPITAL ENCOUNTER (OUTPATIENT)
Dept: RADIOLOGY | Facility: HOSPITAL | Age: 63
Discharge: HOME | End: 2023-03-29
Attending: ORTHOPAEDIC SURGERY
Payer: COMMERCIAL

## 2023-03-29 DIAGNOSIS — M79.641 BILATERAL HAND PAIN: Primary | ICD-10-CM

## 2023-03-29 DIAGNOSIS — G89.29 CHRONIC HAND PAIN, RIGHT: ICD-10-CM

## 2023-03-29 DIAGNOSIS — M79.641 CHRONIC HAND PAIN, RIGHT: ICD-10-CM

## 2023-03-29 DIAGNOSIS — M79.642 BILATERAL HAND PAIN: ICD-10-CM

## 2023-03-29 DIAGNOSIS — M19.041 PRIMARY OSTEOARTHRITIS OF BOTH HANDS: ICD-10-CM

## 2023-03-29 DIAGNOSIS — M79.642 BILATERAL HAND PAIN: Primary | ICD-10-CM

## 2023-03-29 DIAGNOSIS — M65.321 TRIGGER INDEX FINGER OF RIGHT HAND: Primary | ICD-10-CM

## 2023-03-29 DIAGNOSIS — M79.641 RIGHT HAND PAIN: ICD-10-CM

## 2023-03-29 DIAGNOSIS — G56.03 CARPAL TUNNEL SYNDROME, BILATERAL: ICD-10-CM

## 2023-03-29 DIAGNOSIS — M19.042 PRIMARY OSTEOARTHRITIS OF BOTH HANDS: ICD-10-CM

## 2023-03-29 DIAGNOSIS — M79.641 BILATERAL HAND PAIN: ICD-10-CM

## 2023-03-29 PROCEDURE — 20550 NJX 1 TENDON SHEATH/LIGAMENT: CPT | Mod: RT,S$GLB,, | Performed by: ORTHOPAEDIC SURGERY

## 2023-03-29 PROCEDURE — 73130 X-RAY EXAM OF HAND: CPT | Mod: TC,50,PO

## 2023-03-29 PROCEDURE — 3066F NEPHROPATHY DOC TX: CPT | Mod: CPTII,S$GLB,, | Performed by: ORTHOPAEDIC SURGERY

## 2023-03-29 PROCEDURE — 20526 THER INJECTION CARP TUNNEL: CPT | Mod: 59,RT,S$GLB, | Performed by: ORTHOPAEDIC SURGERY

## 2023-03-29 PROCEDURE — 99999 PR PBB SHADOW E&M-EST. PATIENT-LVL III: CPT | Mod: PBBFAC,,, | Performed by: ORTHOPAEDIC SURGERY

## 2023-03-29 PROCEDURE — 99204 PR OFFICE/OUTPT VISIT, NEW, LEVL IV, 45-59 MIN: ICD-10-PCS | Mod: 25,S$GLB,, | Performed by: ORTHOPAEDIC SURGERY

## 2023-03-29 PROCEDURE — 3060F POS MICROALBUMINURIA REV: CPT | Mod: CPTII,S$GLB,, | Performed by: ORTHOPAEDIC SURGERY

## 2023-03-29 PROCEDURE — 3066F PR DOCUMENTATION OF TREATMENT FOR NEPHROPATHY: ICD-10-PCS | Mod: CPTII,S$GLB,, | Performed by: ORTHOPAEDIC SURGERY

## 2023-03-29 PROCEDURE — 1160F PR REVIEW ALL MEDS BY PRESCRIBER/CLIN PHARMACIST DOCUMENTED: ICD-10-PCS | Mod: CPTII,S$GLB,, | Performed by: ORTHOPAEDIC SURGERY

## 2023-03-29 PROCEDURE — 1160F RVW MEDS BY RX/DR IN RCRD: CPT | Mod: CPTII,S$GLB,, | Performed by: ORTHOPAEDIC SURGERY

## 2023-03-29 PROCEDURE — 20550 TENDON SHEATH: ICD-10-PCS | Mod: RT,S$GLB,, | Performed by: ORTHOPAEDIC SURGERY

## 2023-03-29 PROCEDURE — 20526 PR INJECT CARPAL TUNNEL: ICD-10-PCS | Mod: 59,RT,S$GLB, | Performed by: ORTHOPAEDIC SURGERY

## 2023-03-29 PROCEDURE — 99204 OFFICE O/P NEW MOD 45 MIN: CPT | Mod: 25,S$GLB,, | Performed by: ORTHOPAEDIC SURGERY

## 2023-03-29 PROCEDURE — 73130 XR HAND COMPLETE 3 VIEWS BILATERAL: ICD-10-PCS | Mod: 26,50,, | Performed by: RADIOLOGY

## 2023-03-29 PROCEDURE — 3044F HG A1C LEVEL LT 7.0%: CPT | Mod: CPTII,S$GLB,, | Performed by: ORTHOPAEDIC SURGERY

## 2023-03-29 PROCEDURE — 1159F PR MEDICATION LIST DOCUMENTED IN MEDICAL RECORD: ICD-10-PCS | Mod: CPTII,S$GLB,, | Performed by: ORTHOPAEDIC SURGERY

## 2023-03-29 PROCEDURE — 3044F PR MOST RECENT HEMOGLOBIN A1C LEVEL <7.0%: ICD-10-PCS | Mod: CPTII,S$GLB,, | Performed by: ORTHOPAEDIC SURGERY

## 2023-03-29 PROCEDURE — 99999 PR PBB SHADOW E&M-EST. PATIENT-LVL III: ICD-10-PCS | Mod: PBBFAC,,, | Performed by: ORTHOPAEDIC SURGERY

## 2023-03-29 PROCEDURE — 3060F PR POS MICROALBUMINURIA RESULT DOCUMENTED/REVIEW: ICD-10-PCS | Mod: CPTII,S$GLB,, | Performed by: ORTHOPAEDIC SURGERY

## 2023-03-29 PROCEDURE — 73130 X-RAY EXAM OF HAND: CPT | Mod: 26,50,, | Performed by: RADIOLOGY

## 2023-03-29 PROCEDURE — 1159F MED LIST DOCD IN RCRD: CPT | Mod: CPTII,S$GLB,, | Performed by: ORTHOPAEDIC SURGERY

## 2023-03-29 RX ORDER — METHYLPREDNISOLONE ACETATE 40 MG/ML
40 INJECTION, SUSPENSION INTRA-ARTICULAR; INTRALESIONAL; INTRAMUSCULAR; SOFT TISSUE
Status: DISCONTINUED | OUTPATIENT
Start: 2023-03-29 | End: 2023-03-29 | Stop reason: HOSPADM

## 2023-03-29 RX ADMIN — METHYLPREDNISOLONE ACETATE 40 MG: 40 INJECTION, SUSPENSION INTRA-ARTICULAR; INTRALESIONAL; INTRAMUSCULAR; SOFT TISSUE at 01:03

## 2023-03-29 NOTE — PROGRESS NOTES
[facility]  History & Physical  Orthopedics    SUBJECTIVE:      COVID-19 attestation:  This patient was treated during the COVID- pandemic.  This was discussed with the patient, they are aware of our current policies and procedures, were given the option of delaying their visit and or switching to a virtual visit, delaying their surgery when applicable, and they elect to proceed.    Chief Complaint:   Chief Complaint   Patient presents with    Left Hand - Pain    Right Hand - Pain       Referring Provider: Jasiel Middleton, *     History of Present Illness:  Patient is a 63 y.o. right hand dominant female who presents today with complaints of pain, numbness and tingling in bilateral hands for 3 months.  She also reports increasing pain to the right index finger.  She reports stiffness in the hands especially in the morning.  She has worn braces at night which help.  She also states that compression gloves help.  SHE DENIES TRAUMA.    The patient works in retail at [employer].    The patient rates their pain as a 2/10.     The patient denies any fevers, chills, N/V, D/C and presents for evaluation.       Past Medical History:   Diagnosis Date    Arteriovenous malformation (AVM) 2016    right middle lobe    Cataract     Colon polyps 2013    Glaucoma     Hypertension     Obesity     Prediabetes     Sarcoidosis     in remission    Sleep apnea     uses cpap     Past Surgical History:   Procedure Laterality Date     SECTION      COLONOSCOPY N/A 3/21/2023    Procedure: COLONOSCOPY;  Surgeon: Popeye Lopez MD;  Location: [facility] ([address] [place] [address]);  Service: Endoscopy;  Laterality: N/A;  PA-60  instr mailed-GT    EMBOLIZATION N/A 2018    Procedure: Embolization;  Surgeon: Whit Surgeon;  Location: [facility];  Service: Radiology;  Laterality: N/A;    EMBOLIZATION N/A 3/27/2019    Procedure: EMBOLIZATION, BLOOD VESSEL-AVM;  Surgeon: Whit Surgeon;  Location: [facility];  Service:  Anesthesiology;  Laterality: N/A;  Room 189/[facility]    ir embolization of pulmonary AVM       Review of patient's allergies indicates:  No Known Allergies  Social History     Social History Narrative    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     Family History   Problem Relation Age of Onset    Glaucoma Mother     Hyperlipidemia Mother     Diabetes Daughter     Stroke Father     Diabetes Father     Hyperlipidemia Father     No Known Problems Sister     No Known Problems Brother     No Known Problems Maternal Aunt     No Known Problems Maternal Uncle     No Known Problems Paternal Aunt     No Known Problems Paternal Uncle     No Known Problems Maternal Grandmother     No Known Problems Maternal Grandfather     No Known Problems Paternal Grandmother     No Known Problems Paternal Grandfather     Amblyopia Neg Hx     Blindness Neg Hx     Cancer Neg Hx     Cataracts Neg Hx     Hypertension Neg Hx     Macular degeneration Neg Hx     Retinal detachment Neg Hx     Strabismus Neg Hx     Thyroid disease Neg Hx     Breast cancer Neg Hx     Colon cancer Neg Hx     Ovarian cancer Neg Hx          Current Outpatient Medications:     ascorbic acid, vitamin C, (VITAMIN C) 500 MG tablet, Take 500 mg by mouth once daily., Disp: , Rfl:     atenoloL (TENORMIN) 100 MG tablet, Take 1 tablet (100 mg total) by mouth once daily., Disp: 90 tablet, Rfl: 3    atorvastatin (LIPITOR) 40 MG tablet, TAKE 1 TABLET(40 MG) BY MOUTH EVERY EVENING, Disp: 90 tablet, Rfl: 1    hydrALAZINE (APRESOLINE) 50 MG tablet, TAKE 1 TABLET(50 MG) BY MOUTH THREE TIMES DAILY, Disp: 270 tablet, Rfl: 3    irbesartan-hydrochlorothiazide (AVALIDE) 300-12.5 mg per tablet, Take 1 tablet by mouth every morning, Disp: 90 tablet, Rfl: 3    timolol maleate 0.5% (TIMOPTIC) 0.5 % Drop, Place 1 drop into both eyes 2 (two) times daily., Disp: 15 mL, Rfl: 2    ROS    Review of Systems:  Constitutional: no fever or chills  Eyes: no visual changes  ENT: no nasal congestion or sore  throat  Respiratory: no cough or shortness of breath  Cardiovascular: no chest pain  Gastrointestinal: no nausea or vomiting, tolerating diet  Musculoskeletal: pain, soreness, numbness/tingling, and decreased ROM    OBJECTIVE:      Vital Signs (Most Recent):  There were no vitals filed for this visit.  There is no height or weight on file to calculate BMI.    Physical Exam    Physical Exam:  Constitutional: The patient appears well-developed and well-nourished. No distress.   Head: Normocephalic and atraumatic.   Nose: Nose normal.   Eyes: Conjunctivae and EOM are normal.   Neck: No tracheal deviation present.   Cardiovascular: Normal rate and intact distal pulses.    Pulmonary/Chest: Effort normal. No respiratory distress.   Abdominal: There is no guarding.   Lymphatic: Negative for adenopathy   Neurological: The patient is alert.   Psychiatric: The patient has a normal mood and affect.     Cervical Exam:  ROM full, supple  Negative Spurling's  Negative Low's    Bilateral Hand/Wrist Examination:    Observation/Inspection:  Swelling  right index    Deformity  none  Discoloration  none     Scars   none    Atrophy  none    HAND/WRIST EXAMINATION:  Finkelstein's Test   Neg  WHAT Test    Neg  CMC grind    Neg  Circumduction test   Neg    Tender to palpation right index A1 pulley fullness of flexor sheath, lacks 1 cm full flexion into the palm, otherwise Bilateral ROM hand/wrist/elbow full    Neurovascular Exam:  Digits WWP, brisk CR < 3s throughout  NVI motor/LTS to M/R/U nerves, radial pulse 2+  2+ biceps and brachioradialis reflexes  Tinel's Test - Carpal Tunnel  neg  Tinel's Test - Cubital Tunnel  neg  Phalen's Test    neg  Median Nerve Compression Test positive bilaterally    Diagnostic Results:      X-rays AP, lateral and oblique bilateral hands taken today are independently reviewed by me and shows Eaton stage II basilar thumb arthritis and mild IP joint arthritis.       ASSESSMENT/PLAN:      63 y.o. yo female  with   Encounter Diagnoses   Name Primary?    Chronic hand pain, right     Carpal tunnel syndrome, bilateral Yes    Trigger index finger of right hand     Primary osteoarthritis of both hands       Plan:  We have discussed the natural history of trigger fingers and arthritis including treatment options such as splinting, oral and topical anti-inflammatories, cortisone injections and surgery.    With regards to her bilateral carpal tunnel syndrome, she can continue to wear her night splints. We have discussed the natural history of carpal tunnel syndrome and the possibility of cortisone shots as well.      -I have offered her a selective injection. I have explained the risks, benefits, and alternatives of the procedure in detail.  The patient voices understanding and all questions have been answered. The patient agrees to proceed as planned. So after a sterile prep of the skin in the normal fashion the right carpal tunnel and right index flexor sheath was injected using a 25 gauge needle with a combination of 1cc 1% plain lidocaine and 40 mg of methylprednisolone.  The patient is cautioned and immediate relief of pain is secondary to the local anesthetic and will be temporary.  After the anesthetic wears off there may be a increase in pain that may last for a few hours or a few days and they should use ice to help alleviate this flair up of pain. Patient tolerated the procedure well.    - F/U as needed for any worsening of symptoms  - Call with any questions/concerns in the interim       The patient's pathophysiology was explained in detail with reference to x-rays, models, other visual aids as appropriate.  Treatment options were discussed in detail.  Questions were invited and answered to the patient's satisfaction. I reviewed Primary care , and other specialty's notes to better coordinate patient's care.        Kourtney Lynne MD    Please be aware that this note has been generated with the assistance of [employer]ava  voice-to-text.  Please excuse any spelling or grammatical errors.

## 2023-03-29 NOTE — TELEPHONE ENCOUNTER
Spoke with the patient. Informed of X-rays scheduled prior to appointment. Patient verbalized understanding and was thankful.       Gisele Mckay MA  Medical Assistant   [facility]

## 2023-03-30 NOTE — PROCEDURES
Tendon Sheath    Date/Time: 3/29/2023 1:45 PM  Performed by: Kourtney Lynne MD  Authorized by: Kourtney Lynne MD     Consent Done?:  Yes (Verbal)  Indications:  Pain  Timeout: prior to procedure the correct patient, procedure, and site was verified    Prep: patient was prepped and draped in usual sterile fashion      Local anesthesia used?: Yes    Anesthesia:  Local infiltration  Local anesthetic:  Lidocaine 1% without epinephrine  Anesthetic total (ml):  1    Location:  Index finger  Site:  R index flexor tendon sheath  Ultrasonic guidance for needle placement?: No    Needle size:  25 G  Approach:  Volar  Medications:  40 mg methylPREDNISolone acetate 40 mg/mL  Patient tolerance:  Patient tolerated the procedure well with no immediate complications  Carpal Tunnel right    Date/Time: 3/29/2023 1:45 PM  Performed by: Kourtney Lynne MD  Authorized by: Kourtney Lynne MD     Consent Done?:  Yes (Verbal)  Indications:  Pain  Timeout: prior to procedure the correct patient, procedure, and site was verified    Prep: patient was prepped and draped in usual sterile fashion      Local anesthesia used?: Yes    Anesthesia:  Local infiltration  Local anesthetic:  Lidocaine 1% without epinephrine  Location:  Wrist  Needle size:  25 G  Medications:  40 mg methylPREDNISolone acetate 40 mg/mL  Patient tolerance:  Patient tolerated the procedure well with no immediate complications

## 2023-04-05 ENCOUNTER — HOSPITAL ENCOUNTER (OUTPATIENT)
Dept: RADIOLOGY | Facility: HOSPITAL | Age: 63
Discharge: HOME | End: 2023-04-05
Attending: INTERNAL MEDICINE
Payer: COMMERCIAL

## 2023-04-05 VITALS — HEIGHT: 65 IN | BODY MASS INDEX: 36.32 KG/M2 | WEIGHT: 218 LBS

## 2023-04-05 DIAGNOSIS — Z12.31 ENCOUNTER FOR SCREENING MAMMOGRAM FOR BREAST CANCER: ICD-10-CM

## 2023-04-05 PROCEDURE — 77063 MAMMO DIGITAL SCREENING BILAT WITH TOMO: ICD-10-PCS | Mod: 26,,, | Performed by: RADIOLOGY

## 2023-04-05 PROCEDURE — 77067 MAMMO DIGITAL SCREENING BILAT WITH TOMO: ICD-10-PCS | Mod: 26,,, | Performed by: RADIOLOGY

## 2023-04-05 PROCEDURE — 77063 BREAST TOMOSYNTHESIS BI: CPT | Mod: 26,,, | Performed by: RADIOLOGY

## 2023-04-05 PROCEDURE — 77067 SCR MAMMO BI INCL CAD: CPT | Mod: 26,,, | Performed by: RADIOLOGY

## 2023-04-05 PROCEDURE — 77067 SCR MAMMO BI INCL CAD: CPT | Mod: TC,PO

## 2023-08-16 ENCOUNTER — LAB VISIT (OUTPATIENT)
Dept: LAB | Facility: HOSPITAL | Age: 63
End: 2023-08-16
Attending: INTERNAL MEDICINE
Payer: COMMERCIAL

## 2023-08-16 ENCOUNTER — OFFICE VISIT (OUTPATIENT)
Dept: INTERNAL MEDICINE | Facility: CLINIC | Age: 63
End: 2023-08-16
Payer: COMMERCIAL

## 2023-08-16 ENCOUNTER — TELEPHONE (OUTPATIENT)
Dept: INTERNAL MEDICINE | Facility: CLINIC | Age: 63
End: 2023-08-16

## 2023-08-16 VITALS
OXYGEN SATURATION: 94 % | HEIGHT: 65 IN | DIASTOLIC BLOOD PRESSURE: 70 MMHG | HEART RATE: 63 BPM | RESPIRATION RATE: 16 BRPM | SYSTOLIC BLOOD PRESSURE: 138 MMHG | BODY MASS INDEX: 37.5 KG/M2 | WEIGHT: 225.06 LBS | TEMPERATURE: 97 F

## 2023-08-16 DIAGNOSIS — E78.5 HYPERLIPIDEMIA, UNSPECIFIED HYPERLIPIDEMIA TYPE: ICD-10-CM

## 2023-08-16 DIAGNOSIS — D86.9 SARCOIDOSIS: ICD-10-CM

## 2023-08-16 DIAGNOSIS — R06.09 DYSPNEA ON EXERTION: ICD-10-CM

## 2023-08-16 DIAGNOSIS — I10 ESSENTIAL HYPERTENSION: Primary | ICD-10-CM

## 2023-08-16 DIAGNOSIS — D86.0 SARCOIDOSIS OF LUNG: ICD-10-CM

## 2023-08-16 DIAGNOSIS — R73.03 PRE-DIABETES: ICD-10-CM

## 2023-08-16 DIAGNOSIS — M85.80 OSTEOPENIA, UNSPECIFIED LOCATION: ICD-10-CM

## 2023-08-16 DIAGNOSIS — Z00.00 ANNUAL PHYSICAL EXAM: ICD-10-CM

## 2023-08-16 DIAGNOSIS — G47.33 OSA (OBSTRUCTIVE SLEEP APNEA): ICD-10-CM

## 2023-08-16 DIAGNOSIS — E66.01 SEVERE OBESITY (BMI 35.0-35.9 WITH COMORBIDITY): ICD-10-CM

## 2023-08-16 DIAGNOSIS — E78.49 OTHER HYPERLIPIDEMIA: ICD-10-CM

## 2023-08-16 DIAGNOSIS — I10 ESSENTIAL HYPERTENSION: ICD-10-CM

## 2023-08-16 LAB
ALBUMIN SERPL BCP-MCNC: 3.7 G/DL (ref 3.5–5.2)
ALP SERPL-CCNC: 84 U/L (ref 55–135)
ALT SERPL W/O P-5'-P-CCNC: 17 U/L (ref 10–44)
ANION GAP SERPL CALC-SCNC: 13 MMOL/L (ref 8–16)
AST SERPL-CCNC: 18 U/L (ref 10–40)
BILIRUB SERPL-MCNC: 0.4 MG/DL (ref 0.1–1)
BUN SERPL-MCNC: 16 MG/DL (ref 8–23)
CALCIUM SERPL-MCNC: 9.6 MG/DL (ref 8.7–10.5)
CHLORIDE SERPL-SCNC: 102 MMOL/L (ref 95–110)
CO2 SERPL-SCNC: 30 MMOL/L (ref 23–29)
CREAT SERPL-MCNC: 0.9 MG/DL (ref 0.5–1.4)
EST. GFR  (NO RACE VARIABLE): >60 ML/MIN/1.73 M^2
GLUCOSE SERPL-MCNC: 78 MG/DL (ref 70–110)
POTASSIUM SERPL-SCNC: 3.7 MMOL/L (ref 3.5–5.1)
PROT SERPL-MCNC: 7.2 G/DL (ref 6–8.4)
SODIUM SERPL-SCNC: 145 MMOL/L (ref 136–145)

## 2023-08-16 PROCEDURE — 3060F POS MICROALBUMINURIA REV: CPT | Mod: CPTII,S$GLB,, | Performed by: INTERNAL MEDICINE

## 2023-08-16 PROCEDURE — 99214 OFFICE O/P EST MOD 30 MIN: CPT | Mod: S$GLB,,, | Performed by: INTERNAL MEDICINE

## 2023-08-16 PROCEDURE — 3044F PR MOST RECENT HEMOGLOBIN A1C LEVEL <7.0%: ICD-10-PCS | Mod: CPTII,S$GLB,, | Performed by: INTERNAL MEDICINE

## 2023-08-16 PROCEDURE — 93005 ELECTROCARDIOGRAM TRACING: CPT | Mod: S$GLB,,, | Performed by: INTERNAL MEDICINE

## 2023-08-16 PROCEDURE — 93005 EKG 12-LEAD: ICD-10-PCS | Mod: S$GLB,,, | Performed by: INTERNAL MEDICINE

## 2023-08-16 PROCEDURE — 1159F PR MEDICATION LIST DOCUMENTED IN MEDICAL RECORD: ICD-10-PCS | Mod: CPTII,S$GLB,, | Performed by: INTERNAL MEDICINE

## 2023-08-16 PROCEDURE — 3060F PR POS MICROALBUMINURIA RESULT DOCUMENTED/REVIEW: ICD-10-PCS | Mod: CPTII,S$GLB,, | Performed by: INTERNAL MEDICINE

## 2023-08-16 PROCEDURE — 3066F PR DOCUMENTATION OF TREATMENT FOR NEPHROPATHY: ICD-10-PCS | Mod: CPTII,S$GLB,, | Performed by: INTERNAL MEDICINE

## 2023-08-16 PROCEDURE — 85025 COMPLETE CBC W/AUTO DIFF WBC: CPT | Performed by: INTERNAL MEDICINE

## 2023-08-16 PROCEDURE — 99214 PR OFFICE/OUTPT VISIT, EST, LEVL IV, 30-39 MIN: ICD-10-PCS | Mod: S$GLB,,, | Performed by: INTERNAL MEDICINE

## 2023-08-16 PROCEDURE — 99999 PR PBB SHADOW E&M-EST. PATIENT-LVL III: ICD-10-PCS | Mod: PBBFAC,,, | Performed by: INTERNAL MEDICINE

## 2023-08-16 PROCEDURE — 3078F DIAST BP <80 MM HG: CPT | Mod: CPTII,S$GLB,, | Performed by: INTERNAL MEDICINE

## 2023-08-16 PROCEDURE — 3078F PR MOST RECENT DIASTOLIC BLOOD PRESSURE < 80 MM HG: ICD-10-PCS | Mod: CPTII,S$GLB,, | Performed by: INTERNAL MEDICINE

## 2023-08-16 PROCEDURE — 36415 COLL VENOUS BLD VENIPUNCTURE: CPT | Mod: PO | Performed by: INTERNAL MEDICINE

## 2023-08-16 PROCEDURE — 93010 ELECTROCARDIOGRAM REPORT: CPT | Mod: S$GLB,,, | Performed by: INTERNAL MEDICINE

## 2023-08-16 PROCEDURE — 3008F PR BODY MASS INDEX (BMI) DOCUMENTED: ICD-10-PCS | Mod: CPTII,S$GLB,, | Performed by: INTERNAL MEDICINE

## 2023-08-16 PROCEDURE — 1160F PR REVIEW ALL MEDS BY PRESCRIBER/CLIN PHARMACIST DOCUMENTED: ICD-10-PCS | Mod: CPTII,S$GLB,, | Performed by: INTERNAL MEDICINE

## 2023-08-16 PROCEDURE — 83036 HEMOGLOBIN GLYCOSYLATED A1C: CPT | Performed by: INTERNAL MEDICINE

## 2023-08-16 PROCEDURE — 3075F PR MOST RECENT SYSTOLIC BLOOD PRESS GE 130-139MM HG: ICD-10-PCS | Mod: CPTII,S$GLB,, | Performed by: INTERNAL MEDICINE

## 2023-08-16 PROCEDURE — 3008F BODY MASS INDEX DOCD: CPT | Mod: CPTII,S$GLB,, | Performed by: INTERNAL MEDICINE

## 2023-08-16 PROCEDURE — 93010 EKG 12-LEAD: ICD-10-PCS | Mod: S$GLB,,, | Performed by: INTERNAL MEDICINE

## 2023-08-16 PROCEDURE — 99999 PR PBB SHADOW E&M-EST. PATIENT-LVL III: CPT | Mod: PBBFAC,,, | Performed by: INTERNAL MEDICINE

## 2023-08-16 PROCEDURE — 3066F NEPHROPATHY DOC TX: CPT | Mod: CPTII,S$GLB,, | Performed by: INTERNAL MEDICINE

## 2023-08-16 PROCEDURE — 1160F RVW MEDS BY RX/DR IN RCRD: CPT | Mod: CPTII,S$GLB,, | Performed by: INTERNAL MEDICINE

## 2023-08-16 PROCEDURE — 3044F HG A1C LEVEL LT 7.0%: CPT | Mod: CPTII,S$GLB,, | Performed by: INTERNAL MEDICINE

## 2023-08-16 PROCEDURE — 3075F SYST BP GE 130 - 139MM HG: CPT | Mod: CPTII,S$GLB,, | Performed by: INTERNAL MEDICINE

## 2023-08-16 PROCEDURE — 1159F MED LIST DOCD IN RCRD: CPT | Mod: CPTII,S$GLB,, | Performed by: INTERNAL MEDICINE

## 2023-08-16 PROCEDURE — 80053 COMPREHEN METABOLIC PANEL: CPT | Performed by: INTERNAL MEDICINE

## 2023-08-16 RX ORDER — HYDRALAZINE HYDROCHLORIDE 50 MG/1
50 TABLET, FILM COATED ORAL 3 TIMES DAILY
Qty: 270 TABLET | Refills: 3 | Status: SHIPPED | OUTPATIENT
Start: 2023-08-16 | End: 2023-11-16 | Stop reason: SDUPTHER

## 2023-08-16 RX ORDER — IRBESARTAN AND HYDROCHLOROTHIAZIDE 300; 12.5 MG/1; MG/1
TABLET, FILM COATED ORAL
Qty: 90 TABLET | Refills: 3 | Status: SHIPPED | OUTPATIENT
Start: 2023-08-16 | End: 2023-11-16 | Stop reason: SDUPTHER

## 2023-08-16 RX ORDER — ATORVASTATIN CALCIUM 40 MG/1
40 TABLET, FILM COATED ORAL NIGHTLY
Qty: 90 TABLET | Refills: 3 | Status: SHIPPED | OUTPATIENT
Start: 2023-08-16 | End: 2023-11-16 | Stop reason: SDUPTHER

## 2023-08-16 RX ORDER — AMOXICILLIN 500 MG
CAPSULE ORAL DAILY
COMMUNITY
End: 2024-03-06

## 2023-08-16 RX ORDER — ATENOLOL 100 MG/1
100 TABLET ORAL DAILY
Qty: 90 TABLET | Refills: 3 | Status: SHIPPED | OUTPATIENT
Start: 2023-08-16 | End: 2023-11-16 | Stop reason: SDUPTHER

## 2023-08-16 NOTE — TELEPHONE ENCOUNTER
"----- Message from Tyron Kim sent at 8/16/2023  2:35 PM CDT -----  Contact: [phone]  type: Lab    Caller is requesting to schedule their Lab appointment prior to annual appointment.  Order is not listed in EPIC.  Please enter order and contact patient to schedule.    Name of Caller:Reema Phillips Date and Time of Labs:before 02/28/24 in the am 9:30-10    Date of Annual Physical Appointment:02/28/24    Where would they like the lab performed?[facility]    Would the patient rather a call back or a response via [facility]? Call back     Best Call Back Number:[phone]    Additional Information:n/a     "Do not send messages requesting lab orders prior to Physical appt on these providers: Rafael Lawton, Louie Sanchez,  Isrrael Pierson and Shawn."        "

## 2023-08-16 NOTE — PATIENT INSTRUCTIONS
"Low Carb Snacks & Diabetes friendly foods   Snacks can be an important part of a balanced, healthy meal plan.   They allow you to eat more frequently, feeling full and satisfied throughout the day.   Also, they allow you to spread carbohydrates evenly, which may stabilize blood sugars.  Plus, snacks are enjoyable!     The amount of carbohydrate needed at snacks varies. Generally, about 15-30 grams of carbohydrate per snack is recommended.    Below you will find some tasty treats.       0-5 gm carb  Crystal Light flavoring (I like fruit punch flavor!)  Vitamin Water Zero  Vanessa antioxidant drinks.   Herbal tea, unsweetened  2 tsp peanut butter on celery or carrots  1/2 cup sugar-free jell-o  1 sugar-free popsicle  ¼ cup blueberries or strawberries  8oz Blue Christal unsweetened almond milk (or there is sugar free vanilla flavored as well).  5 baby carrots & celery sticks, cucumbers, bell peppers dipped in ¼ cup salsa, 2Tbsp light ranch dressing or 2Tbsp plain Greek yogurt  10 Goldfish crackers  ½ oz low-fat cheese or string cheese  1 closed handful of nuts, unsalted (example-->almonds, pistachios, cashews, peanuts, etc).  1 Tbsp of sunflower seeds, unsalted  1 cup Smart Pop popcorn  1 whole grain brown rice cake   "Think Thin" protein bars - my personal favorite is peanut butter, chocolate brownie, and oreo  Quest bars (my favorite is birthday cake, and also cinnamon roll)    Premier protein shakes - sold at [employer], or other brand alternatives - usually 1 - 2 grams of carbs (strawberry, vanilla, chocolate flavors)  Scrambled eggs! Or a fried egg or boiled eggs - add sliced tomatoes, cilantro or some chopped green onions.        15 gm carb  1 small piece of fruit or ½ banana or 1/2 cup lite canned fruit  3 letha cracker squares  3 cups Smart Pop popcorn, top spray butter, Dozier lite salt or cinnamon and Truvia  5 Vanilla Wafers  ½ cup low fat, no added sugar ice cream or frozen yogurt (Blue bell, Blue Bunny, Weight " "Watchers, Skinny Cow)  ½ turkey, ham, or chicken sandwich  ½ c fruit with ½ c Cottage cheese  4-6 unsalted wheat crackers with 1 oz low fat cheese or 1 tbsp peanut butter   30-45 goldfish crackers (depending on flavor)   7-8 Gnosticism mini brown rice cakes (caramel, apple cinnamon, chocolate)   12 Gnosticism mini brown rice cakes (cheddar, bbq, ranch)   1/3 cup hummus dip with raw veg  1/2 whole wheat torres, 1Tbsp hummus  Mini Pizza (1/2 whole wheat English muffin, low-fat  cheese, tomato sauce)  100 calorie snack pack (Oreo, Chips Ahoy, Ritz Mix, Baked Cheetos)  4-6 oz. light or Greek Style yogurt (Chobani, Yoplait, Okios, Stoneyfield)  ½ cup sugar-free pudding    6 in. wheat tortilla or torres oven toasted chips (topped with spray butter flavoring, cinnamon, Truvia OR spray butter, garlic powder, chili powder)   18 BBQ Popchips (available at [employer], [employer], [employer])  Mini bagel (small size) toasted - add fat free cream cheese or avocado and sliced tomato on top - yum!   1/2 cup Halo top icecream - birthday cake is my go-to flavor.     Tips and Tricks:   My favorite "secret" seasoning to make things extra yummy is cinnamon for sweet taste or adding   "everything but the bagel" seasoning (you can get on amazon.com or at  joes. I have seen at local groceries such as [employer] too!).     Dark colored fruits are your friend -- blueberries, strawberries, raspberries, blackberries. They have a lower sugar content. So will be the best choice for you.   Light colored fruits are NOT your friend - they tend to be higher in sugar and are not the best options for an ADA diet - examples are oranges, bananas, peaches, watermelon, -- you can eat these, but carefully and in moderation.     Other snack choices   Celery with peanut butter  Celery with tuna salad  Dill pickles and cheddar cheese (no kidding, it's a great combo)  Nuts (keep raw ones in the freezer if you think you'll overeat them)  Sunflower seeds (get them in the " "shell so it will take longer to eat them)  Other seeds (How to Toast Pumpkin or Squash Seeds)   Pistachios or almonds - will fill you up and are tasty!   Low-Carb Trail Mix  Jerky (beef or turkey -- try to find low-sugar varieties)  Salami slices (you can find in the deli section)  Cheese sticks, such as string cheese  Sugar-free Jello, alone or with cottage cheese and a sprinkling of nuts. Make sugar-free lime Jello with part coconut milk -- For a large package, dissolve the powder in a cup of boiling water, add a can of coconut milk, and then add the rest of the water. Stir well.  Pepperoni "chips" -- Zap the slices in the microwave  Cheese with a few apple slices  4-ounce plain or sugar-free yogurt with berries and flax seed meal  Smoked salmon and cream cheese on cucumber slices  Lettuce Roll-ups -- Roll luncheon meat, egg salad, tuna or other filling and veggies in lettuce leaves  Lunch Meat Roll-ups -- Roll cheese or veggies in lunch meat (read the labels for carbs on the lunch meat)  Spread bean dip, spinach dip, or other low-carb dip or spread on the lunch meat or lettuce and then roll it up  Raw veggies and spinach dip, or other low-carb dip  Pork rinds (Chicharrón), with or without dip  Ricotta cheese with fruit and/or nuts and/or flax seed meal  Mushrooms with cheese spread inside (or other spreads or dips)  Low-carb snack bars (watch out for sugar alcohols, especially maltitol)  Product Review: Atkins Advantage Bars  Pepperoni Chips -- Microwave pepperoni slices until crisp. Great with cheeses and dips  Garlic Parmesan Flax Seed Crackers  Parmesan Crisps -- Good when you want a crunchy snack.  Peanut Butter Protein Balls    "

## 2023-08-16 NOTE — PROGRESS NOTES
Subjective     Patient ID: Cydney Salazar is a 63 y.o. female.    Chief Complaint: Follow-up (6 mos follow up )    HPI  Pt with HTN, Severe obesity, HLD, Pulmonary sarcoidosis, KAREY, Prediabetes is here for 6 month f/u. Pt is c/o around 6 months of waxing/waning symptoms of SOB only with exertion. It usually takes place when walking 100 feet. No CP, palpitations, diaphoresis, leg swelling, hx of CAD/CHF. Pt also denies any symptoms at rest.   Review of Systems   Constitutional:  Negative for activity change, appetite change, chills, diaphoresis, fatigue, fever and unexpected weight change.   HENT:  Negative for postnasal drip, rhinorrhea, sinus pressure/congestion, sneezing, sore throat, trouble swallowing and voice change.    Respiratory:  Negative for cough, shortness of breath and wheezing.    Cardiovascular:  Negative for chest pain, palpitations and leg swelling.   Gastrointestinal:  Negative for abdominal pain, blood in stool, constipation, diarrhea, nausea and vomiting.   Genitourinary:  Negative for dysuria.   Musculoskeletal:  Negative for arthralgias and myalgias.   Integumentary:  Negative for rash and wound.   Allergic/Immunologic: Negative for environmental allergies and food allergies.   Hematological:  Negative for adenopathy. Does not bruise/bleed easily.          Objective     Physical Exam  Constitutional:       General: She is not in acute distress.     Appearance: Normal appearance. She is well-developed. She is not diaphoretic.   HENT:      Head: Normocephalic and atraumatic.      Right Ear: External ear normal.      Left Ear: External ear normal.      Nose: Nose normal.      Mouth/Throat:      Pharynx: No oropharyngeal exudate.   Eyes:      General: No scleral icterus.        Right eye: No discharge.         Left eye: No discharge.      Conjunctiva/sclera: Conjunctivae normal.      Pupils: Pupils are equal, round, and reactive to light.   Neck:      Vascular: No JVD.   Cardiovascular:       Rate and Rhythm: Normal rate and regular rhythm.      Pulses: Normal pulses.      Heart sounds: Normal heart sounds.   Pulmonary:      Effort: Pulmonary effort is normal. No respiratory distress.      Breath sounds: Normal breath sounds. No wheezing, rhonchi or rales.   Abdominal:      General: Bowel sounds are normal. There is no distension.      Palpations: Abdomen is soft.      Tenderness: There is no abdominal tenderness. There is no guarding or rebound.   Musculoskeletal:      Cervical back: Neck supple.      Right lower leg: No edema.      Left lower leg: No edema.   Lymphadenopathy:      Cervical: No cervical adenopathy.   Skin:     General: Skin is warm and dry.      Coloration: Skin is not pale.      Findings: No rash.   Neurological:      General: No focal deficit present.      Mental Status: She is alert and oriented to person, place, and time.      Gait: Gait normal.   Psychiatric:         Behavior: Behavior normal.         Thought Content: Thought content normal.            Assessment and Plan     1. Essential hypertension    2. Sarcoidosis    3. Pre-diabetes    4. Severe obesity (BMI 35.0-35.9 with comorbidity)    5. Osteopenia, unspecified location    6. KAREY (obstructive sleep apnea)    7. Hyperlipidemia, unspecified hyperlipidemia type    8. Sarcoidosis of lung    9. Other hyperlipidemia  -     atorvastatin (LIPITOR) 40 MG tablet; Take 1 tablet (40 mg total) by mouth every evening.  Dispense: 90 tablet; Refill: 3    10. Dyspnea on exertion  -     EKG 12-lead; Future; Expected date: 08/16/2023    Other orders  -     irbesartan-hydrochlorothiazide (AVALIDE) 300-12.5 mg per tablet; Take 1 tablet by mouth every morning  Dispense: 90 tablet; Refill: 3  -     atenoloL (TENORMIN) 100 MG tablet; Take 1 tablet (100 mg total) by mouth once daily.  Dispense: 90 tablet; Refill: 3  -     hydrALAZINE (APRESOLINE) 50 MG tablet; Take 1 tablet (50 mg total) by mouth 3 (three) times daily.  Dispense: 270 tablet;  Refill: 3       HTN- stable on current meds      Severe obesity- diet/exercise stressed       HLD- continue Lipitor 40 mg daily       Pulmonary sarcoidosis- followed by Pulm       KAREY- stable on CPAP      Prediabetes- last HA1C of 6.1(2/23)<--6.0(8/22)<--6.0(2/22)<--5.9(8/21)<--6.1(1/21)      SOB with exertion- check EKG     F/u in 6 months for annual exam

## 2023-08-17 LAB
BASOPHILS # BLD AUTO: 0.04 K/UL (ref 0–0.2)
BASOPHILS NFR BLD: 0.6 % (ref 0–1.9)
DIFFERENTIAL METHOD: ABNORMAL
EOSINOPHIL # BLD AUTO: 0.1 K/UL (ref 0–0.5)
EOSINOPHIL NFR BLD: 2.1 % (ref 0–8)
ERYTHROCYTE [DISTWIDTH] IN BLOOD BY AUTOMATED COUNT: 15.3 % (ref 11.5–14.5)
ESTIMATED AVG GLUCOSE: 123 MG/DL (ref 68–131)
HBA1C MFR BLD: 5.9 % (ref 4–5.6)
HCT VFR BLD AUTO: 43.8 % (ref 37–48.5)
HGB BLD-MCNC: 13.1 G/DL (ref 12–16)
IMM GRANULOCYTES # BLD AUTO: 0.02 K/UL (ref 0–0.04)
IMM GRANULOCYTES NFR BLD AUTO: 0.3 % (ref 0–0.5)
LYMPHOCYTES # BLD AUTO: 2 K/UL (ref 1–4.8)
LYMPHOCYTES NFR BLD: 32.6 % (ref 18–48)
MCH RBC QN AUTO: 27.6 PG (ref 27–31)
MCHC RBC AUTO-ENTMCNC: 29.9 G/DL (ref 32–36)
MCV RBC AUTO: 92 FL (ref 82–98)
MONOCYTES # BLD AUTO: 0.5 K/UL (ref 0.3–1)
MONOCYTES NFR BLD: 8.1 % (ref 4–15)
NEUTROPHILS # BLD AUTO: 3.5 K/UL (ref 1.8–7.7)
NEUTROPHILS NFR BLD: 56.3 % (ref 38–73)
NRBC BLD-RTO: 0 /100 WBC
PLATELET # BLD AUTO: 313 K/UL (ref 150–450)
PMV BLD AUTO: 10.6 FL (ref 9.2–12.9)
RBC # BLD AUTO: 4.74 M/UL (ref 4–5.4)
WBC # BLD AUTO: 6.17 K/UL (ref 3.9–12.7)

## 2023-10-05 DIAGNOSIS — Z00.00 ENCOUNTER FOR PREVENTIVE CARE: Primary | ICD-10-CM

## 2023-11-16 DIAGNOSIS — E78.49 OTHER HYPERLIPIDEMIA: ICD-10-CM

## 2023-11-16 RX ORDER — HYDRALAZINE HYDROCHLORIDE 50 MG/1
50 TABLET, FILM COATED ORAL 3 TIMES DAILY
Qty: 270 TABLET | Refills: 3 | Status: SHIPPED | OUTPATIENT
Start: 2023-11-16

## 2023-11-16 RX ORDER — ATORVASTATIN CALCIUM 40 MG/1
40 TABLET, FILM COATED ORAL NIGHTLY
Qty: 90 TABLET | Refills: 3 | Status: SHIPPED | OUTPATIENT
Start: 2023-11-16

## 2023-11-16 RX ORDER — IRBESARTAN AND HYDROCHLOROTHIAZIDE 300; 12.5 MG/1; MG/1
TABLET, FILM COATED ORAL
Qty: 90 TABLET | Refills: 3 | Status: SHIPPED | OUTPATIENT
Start: 2023-11-16

## 2023-11-16 RX ORDER — ATENOLOL 100 MG/1
100 TABLET ORAL DAILY
Qty: 90 TABLET | Refills: 3 | Status: SHIPPED | OUTPATIENT
Start: 2023-11-16 | End: 2024-11-15

## 2023-11-16 NOTE — TELEPHONE ENCOUNTER
Care Due:                  Date            Visit Type   Department     Provider  --------------------------------------------------------------------------------                                EP -                              PRIMARY      MET INTERNAL  Last Visit: 08-      CARE ([facility])   MEDICINE       Jasiel Middleton                              EP -                              PRIMARY      [facility] INTERNAL  Next Visit: 02-      CARE ([facility])   MEDICINE       Jasiel Middleton                                                            Last  Test          Frequency    Reason                     Performed    Due Date  --------------------------------------------------------------------------------    Lipid Panel.  12 months..  atorvastatin.............  02- 02-    [facility] Embedded Care Due Messages. Reference number: [id number].   11/16/2023 9:25:49 AM CST

## 2023-12-13 ENCOUNTER — CLINICAL SUPPORT (OUTPATIENT)
Dept: OPHTHALMOLOGY | Facility: CLINIC | Age: 63
End: 2023-12-13
Payer: COMMERCIAL

## 2023-12-13 ENCOUNTER — OFFICE VISIT (OUTPATIENT)
Dept: OPTOMETRY | Facility: CLINIC | Age: 63
End: 2023-12-13
Payer: COMMERCIAL

## 2023-12-13 DIAGNOSIS — H52.4 HYPEROPIA WITH PRESBYOPIA OF BOTH EYES: ICD-10-CM

## 2023-12-13 DIAGNOSIS — H40.053 OCULAR HYPERTENSION, BILATERAL: ICD-10-CM

## 2023-12-13 DIAGNOSIS — D86.9 SARCOIDOSIS: ICD-10-CM

## 2023-12-13 DIAGNOSIS — H52.03 HYPEROPIA WITH PRESBYOPIA OF BOTH EYES: ICD-10-CM

## 2023-12-13 DIAGNOSIS — H40.1121 PRIMARY OPEN ANGLE GLAUCOMA (POAG) OF LEFT EYE, MILD STAGE: ICD-10-CM

## 2023-12-13 DIAGNOSIS — H40.053 OCULAR HYPERTENSION, BILATERAL: Primary | ICD-10-CM

## 2023-12-13 DIAGNOSIS — H25.13 NUCLEAR SCLEROSIS OF BOTH EYES: ICD-10-CM

## 2023-12-13 PROCEDURE — 1159F MED LIST DOCD IN RCRD: CPT | Mod: CPTII,S$GLB,, | Performed by: OPTOMETRIST

## 2023-12-13 PROCEDURE — 92012 INTRM OPH EXAM EST PATIENT: CPT | Mod: S$GLB,,, | Performed by: OPTOMETRIST

## 2023-12-13 PROCEDURE — 99999 PR PBB SHADOW E&M-EST. PATIENT-LVL III: CPT | Mod: PBBFAC,,, | Performed by: OPTOMETRIST

## 2023-12-13 NOTE — PROGRESS NOTES
HPI     Follow-up            Comments: Eye exam and refraction.    Prior diagnosis of bilateral ocular hypertension, and mild stage POAG in   left eye.  Using Timolol Maleate 0.5% ophthalmic solution in both eyes daily for IOP   contro;/reduction.  Previously used Combigan, but insurance no  longer   pays for Combigan.  Repeat HVF test (24-2) done today.           Comments    Patient in for progress check.    Being followed for (diagnosis):      In today for review of repeat HVF test (24-2) done today, and for  recheck   of IOP..  Refer to HVF test results in harmony.      Date last seen:  03/15/2023    Doctor last seen:  Dr. Romero    Prescribed eye medications(s) using:  Timolol    OTC eye medication(s) using:  No    Signs/symptoms of condition resolved/better/stable/worse?:  Pt is feeling   fine.                       Last edited by Darrius Romero OD on 12/13/2023  9:28 AM.            Assessment /Plan     For exam results, see Encounter Report.    1. Ocular hypertension, bilateral        2. Primary open angle glaucoma (POAG) of left eye, mild stage        3. Nuclear sclerosis of both eyes        4. Sarcoidosis        5. Hyperopia with presbyopia of both eyes                     Prior diagnosis of bialteral ocular hypertension, and mild stage primary open angle glaucoma in the left eye.  Using timolol maleate 0.5% ophthalmic solution two times per day in both eyes.  IOP remains within normal limits in each eye - stable in each eye.    Repeat HVF test (24-2) done today.    Early nuclear sclerosis of lens of both eyes, consistent with age.      VA with last refraction placed into phoropter fine in each eye.  No change made to the last spectacle lens Rx issued.    Continue to instill one drop of timolol malete 0.5% ophthalmic solution into both eyes every twelve hours for control/reduction of intraocular pressure (IOP).    Recheck in six months (June, 2024), with repeat HVF test (24-52) at that time

## 2023-12-13 NOTE — PROGRESS NOTES
VISUAL FIELD 24-2 SS-OU-DONE/AD  OD-REL-FIX-COOP-GOOD/AD  OS-REL-FIX-COOP-GOOD/AD    PT HAS N KNOWN ALLERGIES TO LATEX OR ADHESIVES./AD

## 2023-12-13 NOTE — PATIENT INSTRUCTIONS
Prior diagnosis of bialteral ocular hypertension, and mild stage primary open angle glaucoma in the left eye.  Using timolol maleate 0.5% ophthalmic solution two times per day in both eyes.  IOP remains within normal limits in each eye - stable in each eye.    Repeat HVF test (24-2) done today.    Early nuclear sclerosis of lens of both eyes, consistent with age.      VA with last refraction placed into phoropter fine in each eye.  No change made to the last spectacle lens Rx issued.    Continue to instill one drop of timolol malete 0.5% ophthalmic solution into both eyes every twelve hours for control/reduction of intraocular pressure (IOP).    Recheck in six months (June, 2024), with repeat HVF test (24-52) at that time

## 2024-02-28 ENCOUNTER — OFFICE VISIT (OUTPATIENT)
Dept: INTERNAL MEDICINE | Facility: CLINIC | Age: 64
End: 2024-02-28
Payer: COMMERCIAL

## 2024-02-28 ENCOUNTER — LAB VISIT (OUTPATIENT)
Dept: LAB | Facility: HOSPITAL | Age: 64
End: 2024-02-28
Attending: INTERNAL MEDICINE
Payer: COMMERCIAL

## 2024-02-28 VITALS
HEIGHT: 65 IN | HEART RATE: 96 BPM | SYSTOLIC BLOOD PRESSURE: 138 MMHG | BODY MASS INDEX: 37.54 KG/M2 | DIASTOLIC BLOOD PRESSURE: 80 MMHG | OXYGEN SATURATION: 96 % | WEIGHT: 225.31 LBS | TEMPERATURE: 97 F

## 2024-02-28 DIAGNOSIS — I10 ESSENTIAL HYPERTENSION: ICD-10-CM

## 2024-02-28 DIAGNOSIS — E78.5 HYPERLIPIDEMIA, UNSPECIFIED HYPERLIPIDEMIA TYPE: Primary | ICD-10-CM

## 2024-02-28 DIAGNOSIS — E78.5 HYPERLIPIDEMIA, UNSPECIFIED HYPERLIPIDEMIA TYPE: ICD-10-CM

## 2024-02-28 DIAGNOSIS — D86.9 SARCOIDOSIS: ICD-10-CM

## 2024-02-28 DIAGNOSIS — Z00.00 ANNUAL PHYSICAL EXAM: ICD-10-CM

## 2024-02-28 DIAGNOSIS — G47.33 OSA (OBSTRUCTIVE SLEEP APNEA): ICD-10-CM

## 2024-02-28 DIAGNOSIS — R73.03 PRE-DIABETES: ICD-10-CM

## 2024-02-28 DIAGNOSIS — D86.0 SARCOIDOSIS OF LUNG: ICD-10-CM

## 2024-02-28 DIAGNOSIS — E66.01 SEVERE OBESITY (BMI 35.0-35.9 WITH COMORBIDITY): ICD-10-CM

## 2024-02-28 LAB
BACTERIA #/AREA URNS AUTO: NORMAL /HPF
BILIRUB UR QL STRIP: NEGATIVE
CLARITY UR REFRACT.AUTO: CLEAR
COLOR UR AUTO: YELLOW
GLUCOSE UR QL STRIP: NEGATIVE
HGB UR QL STRIP: NEGATIVE
KETONES UR QL STRIP: NEGATIVE
LEUKOCYTE ESTERASE UR QL STRIP: NEGATIVE
MICROSCOPIC COMMENT: NORMAL
NITRITE UR QL STRIP: NEGATIVE
PH UR STRIP: 6 [PH] (ref 5–8)
PROT UR QL STRIP: NEGATIVE
RBC #/AREA URNS AUTO: 0 /HPF (ref 0–4)
SP GR UR STRIP: 1.01 (ref 1–1.03)
SQUAMOUS #/AREA URNS AUTO: 0 /HPF
URN SPEC COLLECT METH UR: NORMAL

## 2024-02-28 PROCEDURE — 1159F MED LIST DOCD IN RCRD: CPT | Mod: CPTII,S$GLB,, | Performed by: INTERNAL MEDICINE

## 2024-02-28 PROCEDURE — 99999 PR PBB SHADOW E&M-EST. PATIENT-LVL III: CPT | Mod: PBBFAC,,, | Performed by: INTERNAL MEDICINE

## 2024-02-28 PROCEDURE — 81001 URINALYSIS AUTO W/SCOPE: CPT | Performed by: INTERNAL MEDICINE

## 2024-02-28 PROCEDURE — 99214 OFFICE O/P EST MOD 30 MIN: CPT | Mod: S$GLB,,, | Performed by: INTERNAL MEDICINE

## 2024-02-28 PROCEDURE — 3044F HG A1C LEVEL LT 7.0%: CPT | Mod: CPTII,S$GLB,, | Performed by: INTERNAL MEDICINE

## 2024-02-28 PROCEDURE — 3079F DIAST BP 80-89 MM HG: CPT | Mod: CPTII,S$GLB,, | Performed by: INTERNAL MEDICINE

## 2024-02-28 PROCEDURE — 3008F BODY MASS INDEX DOCD: CPT | Mod: CPTII,S$GLB,, | Performed by: INTERNAL MEDICINE

## 2024-02-28 PROCEDURE — 81003 URINALYSIS AUTO W/O SCOPE: CPT | Mod: 59 | Performed by: INTERNAL MEDICINE

## 2024-02-28 PROCEDURE — 3075F SYST BP GE 130 - 139MM HG: CPT | Mod: CPTII,S$GLB,, | Performed by: INTERNAL MEDICINE

## 2024-02-28 NOTE — PROGRESS NOTES
Subjective     Patient ID: Cydney Salazar is a 64 y.o. female.    Chief Complaint: Annual Exam    HPI  64 y.o. Female here for annual exam.      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  No date: Cataract  No date: Glaucoma  No date: Hypertension  No date: Obesity  No date: Prediabetes  No date: Sarcoidosis      Comment:  in remission  No date: Sleep apnea      Comment:  uses cpap  Past Surgical History:  No date:  SECTION  2018: EMBOLIZATION; N/A      Comment:  Procedure: Embolization;  Surgeon: Whit Surgeon;                 Location: [facility];  Service: Radiology;  Laterality:                N/A;  3/27/2019: EMBOLIZATION; N/A      Comment:  Procedure: EMBOLIZATION, BLOOD VESSEL-AVM;  Surgeon:                Whit Surgeon;  Location: [facility];  Service:                Anesthesiology;  Laterality: N/A;  Room 189/[facility]  No date: ir embolization of pulmonary AVM  Social History    Socioeconomic History      Marital status:       Spouse name: Not on file      Number of children: Not on file      Years of education: Not on file      Highest education level: Not on file    Occupational History      Not on file    Tobacco Use      Smoking status: Never Smoker      Smokeless tobacco: Never Used      Tobacco comment:  smokes, usually not in the home    Substance and Sexual Activity      Alcohol use: Yes        Comment: rarely      Drug use: No      Sexual activity: Yes        Partners: Male        Birth control/protection: Post-menopausal, None    Other Topics      Concerns:        Not on file    Social History Narrative      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     Social Determinants  of Health  Financial Resource Strain:       Difficulty of Paying Living Expenses: Not on file  Food Insecurity:       Worried About Running Out of Food in the Last Year: Not on file      Ran Out of Food in the Last Year: Not on file  Transportation Needs:       Lack of Transportation (Medical): Not on file      Lack of Transportation (Non-Medical): Not on file  Physical Activity:       Days of Exercise per Week: Not on file      Minutes of Exercise per Session: Not on file  Stress:       Feeling of Stress : Not on file  Social Connections:       Frequency of Communication with Friends and Family: Not on file      Frequency of Social Gatherings with Friends and Family: Not on file      Attends [employer] Services: Not on file      Active Member of Clubs or Organizations: Not on file      Attends Club or Organization Meetings: Not on file      Marital Status: Not on file  Review of patient's allergies indicates:  No Known Allergies  Review of Systems   Constitutional:  Negative for activity change, appetite change, chills, diaphoresis, fatigue, fever and unexpected weight change.   HENT:  Negative for nasal congestion, mouth sores, postnasal drip, rhinorrhea, sinus pressure/congestion, sneezing, sore throat, trouble swallowing and voice change.    Eyes:  Negative for pain, discharge and visual disturbance.   Respiratory:  Negative for cough, shortness of breath and wheezing.    Cardiovascular:  Negative for chest pain, palpitations and leg swelling.   Gastrointestinal:  Negative for abdominal pain, blood in stool, constipation, diarrhea, nausea and vomiting.   Endocrine: Negative for cold intolerance and heat intolerance.   Genitourinary:  Negative for difficulty urinating, dysuria, frequency, hematuria and urgency.   Musculoskeletal:  Negative for arthralgias and myalgias.   Integumentary:  Negative for rash and wound.   Allergic/Immunologic: Negative for environmental allergies and food allergies.   Neurological:   Negative for dizziness, tremors, seizures, syncope, weakness, light-headedness and headaches.   Hematological:  Negative for adenopathy. Does not bruise/bleed easily.   Psychiatric/Behavioral:  Negative for confusion and sleep disturbance. The patient is not nervous/anxious.           Objective     Physical Exam  Vitals and nursing note reviewed.   Constitutional:       General: She is not in acute distress.     Appearance: Normal appearance. She is well-developed. She is not diaphoretic.   HENT:      Head: Normocephalic and atraumatic.      Right Ear: External ear normal.      Left Ear: External ear normal.      Nose: Nose normal.      Mouth/Throat:      Pharynx: No oropharyngeal exudate.   Eyes:      General: No scleral icterus.        Right eye: No discharge.         Left eye: No discharge.      Conjunctiva/sclera: Conjunctivae normal.      Pupils: Pupils are equal, round, and reactive to light.   Neck:      Thyroid: No thyromegaly.      Vascular: No JVD.   Cardiovascular:      Rate and Rhythm: Normal rate and regular rhythm.      Pulses: Normal pulses.      Heart sounds: Normal heart sounds. No murmur heard.  Pulmonary:      Effort: Pulmonary effort is normal. No respiratory distress.      Breath sounds: Normal breath sounds. No wheezing, rhonchi or rales.   Chest:      Chest wall: No tenderness.   Abdominal:      General: Bowel sounds are normal. There is no distension.      Palpations: Abdomen is soft.      Tenderness: There is no abdominal tenderness. There is no guarding or rebound.   Musculoskeletal:      Cervical back: Neck supple.      Right lower leg: No edema.      Left lower leg: No edema.   Lymphadenopathy:      Cervical: No cervical adenopathy.   Skin:     General: Skin is warm and dry.      Coloration: Skin is not pale.      Findings: No rash.   Neurological:      General: No focal deficit present.      Mental Status: She is alert and oriented to person, place, and time.      Gait: Gait normal.    Psychiatric:         Behavior: Behavior normal.         Thought Content: Thought content normal.         Judgment: Judgment normal.            Assessment and Plan     1. Hyperlipidemia, unspecified hyperlipidemia type    2. Essential hypertension    3. Sarcoidosis of lung    4. Pre-diabetes    5. Severe obesity (BMI 35.0-35.9 with comorbidity)    6. KAREY (obstructive sleep apnea)         Blood work reviewed with pt     HTN- stable on current meds      Severe obesity- diet/exercise stressed       HLD- continue Lipitor 40 mg daily       Pulmonary sarcoidosis- followed by Pulm       KAREY- stable on CPAP      Prediabetes- last HA1C of 6.1(2/24)<--5.9(8/23)<--6.1(2/23)<--6.0(8/22)<--6.0(2/22)<--5.9(8/21)<--6.1(1/21)      F/u in 6 months

## 2024-03-01 ENCOUNTER — TELEPHONE (OUTPATIENT)
Dept: INTERNAL MEDICINE | Facility: CLINIC | Age: 64
End: 2024-03-01
Payer: COMMERCIAL

## 2024-03-01 DIAGNOSIS — I10 ESSENTIAL HYPERTENSION: ICD-10-CM

## 2024-03-01 DIAGNOSIS — R73.03 PRE-DIABETES: ICD-10-CM

## 2024-03-01 DIAGNOSIS — E78.5 HYPERLIPIDEMIA, UNSPECIFIED HYPERLIPIDEMIA TYPE: Primary | ICD-10-CM

## 2024-03-06 ENCOUNTER — OFFICE VISIT (OUTPATIENT)
Dept: OBSTETRICS AND GYNECOLOGY | Facility: CLINIC | Age: 64
End: 2024-03-06
Attending: OBSTETRICS & GYNECOLOGY
Payer: COMMERCIAL

## 2024-03-06 VITALS
SYSTOLIC BLOOD PRESSURE: 160 MMHG | DIASTOLIC BLOOD PRESSURE: 80 MMHG | HEIGHT: 65 IN | WEIGHT: 221.81 LBS | BODY MASS INDEX: 36.96 KG/M2

## 2024-03-06 DIAGNOSIS — Z12.31 ENCOUNTER FOR SCREENING MAMMOGRAM FOR MALIGNANT NEOPLASM OF BREAST: ICD-10-CM

## 2024-03-06 DIAGNOSIS — Z01.419 WELL FEMALE EXAM WITH ROUTINE GYNECOLOGICAL EXAM: Primary | ICD-10-CM

## 2024-03-06 PROCEDURE — 3079F DIAST BP 80-89 MM HG: CPT | Mod: CPTII,S$GLB,, | Performed by: OBSTETRICS & GYNECOLOGY

## 2024-03-06 PROCEDURE — 3077F SYST BP >= 140 MM HG: CPT | Mod: CPTII,S$GLB,, | Performed by: OBSTETRICS & GYNECOLOGY

## 2024-03-06 PROCEDURE — 99999 PR PBB SHADOW E&M-EST. PATIENT-LVL III: CPT | Mod: PBBFAC,,, | Performed by: OBSTETRICS & GYNECOLOGY

## 2024-03-06 PROCEDURE — 99396 PREV VISIT EST AGE 40-64: CPT | Mod: S$GLB,,, | Performed by: OBSTETRICS & GYNECOLOGY

## 2024-03-06 PROCEDURE — 1160F RVW MEDS BY RX/DR IN RCRD: CPT | Mod: CPTII,S$GLB,, | Performed by: OBSTETRICS & GYNECOLOGY

## 2024-03-06 PROCEDURE — 3044F HG A1C LEVEL LT 7.0%: CPT | Mod: CPTII,S$GLB,, | Performed by: OBSTETRICS & GYNECOLOGY

## 2024-03-06 PROCEDURE — 3008F BODY MASS INDEX DOCD: CPT | Mod: CPTII,S$GLB,, | Performed by: OBSTETRICS & GYNECOLOGY

## 2024-03-06 PROCEDURE — 1159F MED LIST DOCD IN RCRD: CPT | Mod: CPTII,S$GLB,, | Performed by: OBSTETRICS & GYNECOLOGY

## 2024-03-06 NOTE — PROGRESS NOTES
SUBJECTIVE:   64 y.o. female   for routine gyn exam. No LMP recorded (lmp unknown). Patient is postmenopausal..  She has no unusual complaints. No PMb. No HRt.         Past Medical History:   Diagnosis Date    Arteriovenous malformation (AVM) 2016    right middle lobe    Cataract     Colon polyps 2013    Glaucoma     Hypertension     Obesity     Prediabetes     Sarcoidosis     in remission    Sleep apnea     uses cpap     Past Surgical History:   Procedure Laterality Date     SECTION      COLONOSCOPY N/A 3/21/2023    Procedure: COLONOSCOPY;  Surgeon: Popeye Lopez MD;  Location: [facility] ENDO (2ND FLR);  Service: Endoscopy;  Laterality: N/A;  PA-60  instr mailed-GT    EMBOLIZATION N/A 2018    Procedure: Embolization;  Surgeon: Whit Surgeon;  Location: [facility];  Service: Radiology;  Laterality: N/A;    EMBOLIZATION N/A 3/27/2019    Procedure: EMBOLIZATION, BLOOD VESSEL-AVM;  Surgeon: Whit Surgeon;  Location: [facility];  Service: Anesthesiology;  Laterality: N/A;  Room 189/[facility]    ir embolization of pulmonary AVM       Social History     Socioeconomic History    Marital status:    Tobacco Use    Smoking status: Never    Smokeless tobacco: Never    Tobacco comments:      smokes, usually not in the home   Substance and Sexual Activity    Alcohol use: Not Currently     Comment: rarely    Drug use: No    Sexual activity: Not Currently     Partners: Male     Birth control/protection: Post-menopausal, None   Social History Narrative    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     Family History   Problem Relation Age of Onset    Glaucoma Mother     Hyperlipidemia Mother     Diabetes Daughter     Stroke Father     Diabetes Father     Hyperlipidemia Father     No Known Problems Sister     No Known Problems Brother     No Known Problems Maternal Aunt     No Known Problems Maternal Uncle     No Known Problems Paternal Aunt     No Known Problems Paternal Uncle     No Known Problems Maternal  Grandmother     No Known Problems Maternal Grandfather     No Known Problems Paternal Grandmother     No Known Problems Paternal Grandfather     Amblyopia Neg Hx     Blindness Neg Hx     Cancer Neg Hx     Cataracts Neg Hx     Hypertension Neg Hx     Macular degeneration Neg Hx     Retinal detachment Neg Hx     Strabismus Neg Hx     Thyroid disease Neg Hx     Breast cancer Neg Hx     Colon cancer Neg Hx     Ovarian cancer Neg Hx      OB History    Para Term  AB Living   1 1 0 0 0 1   SAB IAB Ectopic Multiple Live Births   0 0 0 0 1      # Outcome Date GA Lbr Jonathon/2nd Weight Sex Delivery Anes PTL Lv   1 Para      CS-Unspec   MINDY         Current Outpatient Medications   Medication Sig Dispense Refill    atenoloL (TENORMIN) 100 MG tablet Take 1 tablet (100 mg total) by mouth once daily. 90 tablet 3    atorvastatin (LIPITOR) 40 MG tablet Take 1 tablet (40 mg total) by mouth every evening. 90 tablet 3    hydrALAZINE (APRESOLINE) 50 MG tablet Take 1 tablet (50 mg total) by mouth 3 (three) times daily. 270 tablet 3    irbesartan-hydrochlorothiazide (AVALIDE) 300-12.5 mg per tablet Take 1 tablet by mouth every morning 90 tablet 3    multivitamin capsule Take 1 capsule by mouth once daily.      timolol maleate 0.5% (TIMOPTIC) 0.5 % Drop Place 1 drop into both eyes 2 (two) times daily. 15 mL 2     No current facility-administered medications for this visit.     Allergies: Patient has no known allergies.     ROS:  Constitutional: no weight loss, weight gain, fever, fatigue  Eyes:  No vision changes, glasses/contacts  ENT/Mouth: No ulcers, sinus problems, ears ringing, headache  Cardiovascular: No inability to lie flat, chest pain, exercise intolerance, swelling, heart palpitations  Respiratory: No wheezing, coughing blood, shortness of breath, or cough  Gastrointestinal: No diarrhea, bloody stool, nausea/vomiting, constipation, gas, hemorrhoids  Genitourinary: No blood in urine, painful urination, urgency of  "urination, frequency of urination, incomplete emptying, incontinence, abnormal bleeding, painful periods, heavy periods, vaginal discharge, vaginal odor, painful intercourse, sexual problems, bleeding after intercourse.  Musculoskeletal: No muscle weakness  Skin/Breast: No painful breasts, nipple discharge, masses, rash, ulcers  Neurological: No passing out, seizures, numbness, headache  Endocrine: No diabetes, hypothyroid, hyperthyroid, hot flashes, hair loss, abnormal hair growth, acne  Psychiatric: No depression, crying  Hematologic: No bruises, bleeding, swollen lymph nodes, anemia.      OBJECTIVE:   The patient appears well, alert, oriented x 3, in no distress.  BP (!) 160/80 (BP Location: Right arm, Patient Position: Sitting, BP Method: Large (Manual))   Ht 5' 5" (1.651 m)   Wt 100.6 kg (221 lb 12.5 oz)   LMP  (LMP Unknown)   BMI 36.91 kg/m²   NECK: no thyromegaly, trachea midline  SKIN: no acne, striae, hirsutism  CHEST: CTAB  CV: RRR  BREAST EXAM: breasts appear normal, no suspicious masses, no skin or nipple changes or axillary nodes  ABDOMEN: no hernias, masses, or hepatosplenomegaly  GENITALIA: normal external genitalia, no erythema, no discharge  URETHRA: normal urethra, normal urethral meatus  VAGINA: Normal  CERVIX: no lesions or cervical motion tenderness  UTERUS: normal size, contour, position, consistency, mobility, non-tender  ADNEXA: no mass, fullness, tenderness      ASSESSMENT:   1. Well female exam with routine gynecological exam        2. Encounter for screening mammogram for malignant neoplasm of breast  Mammo Digital Screening Bilat w/ Raman          PLAN:   Orders Placed This Encounter    Mammo Digital Screening Bilat w/ Raman     Discussed healthy lifestyle including regular exercise, healthy eating, etc.  Return to clinic in 1 year    "

## 2024-04-17 ENCOUNTER — HOSPITAL ENCOUNTER (OUTPATIENT)
Dept: RADIOLOGY | Facility: HOSPITAL | Age: 64
Discharge: HOME | End: 2024-04-17
Attending: OBSTETRICS & GYNECOLOGY
Payer: COMMERCIAL

## 2024-04-17 DIAGNOSIS — Z12.31 ENCOUNTER FOR SCREENING MAMMOGRAM FOR MALIGNANT NEOPLASM OF BREAST: ICD-10-CM

## 2024-04-17 PROCEDURE — 77063 BREAST TOMOSYNTHESIS BI: CPT | Mod: TC

## 2024-04-17 PROCEDURE — 77063 BREAST TOMOSYNTHESIS BI: CPT | Mod: 26,,, | Performed by: RADIOLOGY

## 2024-04-17 PROCEDURE — 77067 SCR MAMMO BI INCL CAD: CPT | Mod: 26,,, | Performed by: RADIOLOGY

## 2024-05-09 ENCOUNTER — TELEPHONE (OUTPATIENT)
Dept: OPHTHALMOLOGY | Facility: CLINIC | Age: 64
End: 2024-05-09
Payer: COMMERCIAL

## 2024-05-09 NOTE — TELEPHONE ENCOUNTER
Appt has been schedule for 06/29/2024 for HVF/and EP since Dr. Romero is retiring in July. Called in Timolol 0.5 % with 3 refills on 5/9/2024. Drops were called into [facility]'s pharmacy [facility].

## 2024-05-15 DIAGNOSIS — H40.053 OCULAR HYPERTENSION, BILATERAL: ICD-10-CM

## 2024-05-15 RX ORDER — TIMOLOL MALEATE 5 MG/ML
1 SOLUTION/ DROPS OPHTHALMIC 2 TIMES DAILY
Qty: 15 ML | Refills: 2 | Status: SHIPPED | OUTPATIENT
Start: 2024-05-15 | End: 2025-05-15

## 2024-06-26 ENCOUNTER — OFFICE VISIT (OUTPATIENT)
Dept: OPTOMETRY | Facility: CLINIC | Age: 64
End: 2024-06-26
Payer: COMMERCIAL

## 2024-06-26 ENCOUNTER — CLINICAL SUPPORT (OUTPATIENT)
Dept: OPHTHALMOLOGY | Facility: CLINIC | Age: 64
End: 2024-06-26
Payer: COMMERCIAL

## 2024-06-26 DIAGNOSIS — H40.053 OCULAR HYPERTENSION, BILATERAL: Primary | ICD-10-CM

## 2024-06-26 DIAGNOSIS — H25.13 NUCLEAR SCLEROSIS OF BOTH EYES: ICD-10-CM

## 2024-06-26 PROCEDURE — 3044F HG A1C LEVEL LT 7.0%: CPT | Mod: CPTII,S$GLB,, | Performed by: OPTOMETRIST

## 2024-06-26 PROCEDURE — 99999 PR PBB SHADOW E&M-EST. PATIENT-LVL II: CPT | Mod: PBBFAC,,, | Performed by: OPTOMETRIST

## 2024-06-26 PROCEDURE — 1160F RVW MEDS BY RX/DR IN RCRD: CPT | Mod: CPTII,S$GLB,, | Performed by: OPTOMETRIST

## 2024-06-26 PROCEDURE — 92083 EXTENDED VISUAL FIELD XM: CPT | Mod: S$GLB,,, | Performed by: OPTOMETRIST

## 2024-06-26 PROCEDURE — 92014 COMPRE OPH EXAM EST PT 1/>: CPT | Mod: S$GLB,,, | Performed by: OPTOMETRIST

## 2024-06-26 PROCEDURE — 92133 CPTRZD OPH DX IMG PST SGM ON: CPT | Mod: S$GLB,,, | Performed by: OPTOMETRIST

## 2024-06-26 PROCEDURE — 1159F MED LIST DOCD IN RCRD: CPT | Mod: CPTII,S$GLB,, | Performed by: OPTOMETRIST

## 2024-06-27 NOTE — PROGRESS NOTES
HPI    IZABEL: 12/13/2023  Last DFE: 03/09/2022  Chief complaint (CC): 64 year old female patient presents for HVF and DFE   per Dr. Romero  Glasses? Yes  Contacts? No  H/o eye surgery, injections or laser: No  H/o eye injury: No  Known eye conditions? No  Family h/o eye conditions?No  Eye gtts? Timolol BID OU      (-) Flashes (-)  Floaters (-) Mucous   (-)  Tearing (-) Itching (-) Burning   (-) Headaches (-) Eye Pain/discomfort (-) Irritation   (-)  Redness (-) Double vision (-) Blurry vision    Diabetic? No  A1c? Lab Results       Component                Value               Date                       HGBA1C                   6.1 (H)             02/28/2024                  Last edited by Ailyn Lewis, OD on 6/27/2024  4:27 PM.            Assessment /Plan     For exam results, see Encounter Report.        Ocular hypertension, bilateral   - IOP normotensive OU today (20/20) with thinner than average CCT (496/496)  - VF today (6/26/24) OD Reliable; Full  OS Reliable; scattered non specific defects    - OCT RNFL (6/26/24) WNL OU    - Continue Timolol BID OU   - RTC 6 months for VF 24-2 and IOP check     Nuclear sclerosis of both eyes   - Not visually significant. Monitor.

## 2024-08-27 DIAGNOSIS — H40.053 OCULAR HYPERTENSION, BILATERAL: Primary | ICD-10-CM

## 2024-08-27 RX ORDER — TIMOLOL MALEATE 5 MG/ML
1 SOLUTION/ DROPS OPHTHALMIC 2 TIMES DAILY
Qty: 15 ML | Refills: 6 | Status: SHIPPED | OUTPATIENT
Start: 2024-08-27 | End: 2025-08-27

## 2024-08-28 ENCOUNTER — LAB VISIT (OUTPATIENT)
Dept: LAB | Facility: HOSPITAL | Age: 64
End: 2024-08-28
Attending: INTERNAL MEDICINE
Payer: COMMERCIAL

## 2024-08-28 ENCOUNTER — OFFICE VISIT (OUTPATIENT)
Dept: INTERNAL MEDICINE | Facility: CLINIC | Age: 64
End: 2024-08-28
Payer: COMMERCIAL

## 2024-08-28 VITALS
DIASTOLIC BLOOD PRESSURE: 77 MMHG | RESPIRATION RATE: 19 BRPM | BODY MASS INDEX: 36.94 KG/M2 | WEIGHT: 221.69 LBS | SYSTOLIC BLOOD PRESSURE: 161 MMHG | HEART RATE: 66 BPM | OXYGEN SATURATION: 94 % | HEIGHT: 65 IN

## 2024-08-28 DIAGNOSIS — E78.5 HYPERLIPIDEMIA, UNSPECIFIED HYPERLIPIDEMIA TYPE: ICD-10-CM

## 2024-08-28 DIAGNOSIS — R73.03 PRE-DIABETES: ICD-10-CM

## 2024-08-28 DIAGNOSIS — I10 ESSENTIAL HYPERTENSION: ICD-10-CM

## 2024-08-28 DIAGNOSIS — D86.0 SARCOIDOSIS OF LUNG: Primary | ICD-10-CM

## 2024-08-28 DIAGNOSIS — G47.33 OSA (OBSTRUCTIVE SLEEP APNEA): ICD-10-CM

## 2024-08-28 DIAGNOSIS — E66.01 SEVERE OBESITY (BMI 35.0-35.9 WITH COMORBIDITY): ICD-10-CM

## 2024-08-28 PROBLEM — E66.2 OBESITY HYPOVENTILATION SYNDROME: Status: RESOLVED | Noted: 2021-01-27 | Resolved: 2024-08-28

## 2024-08-28 LAB
ALBUMIN SERPL BCP-MCNC: 3.6 G/DL (ref 3.5–5.2)
ALP SERPL-CCNC: 80 U/L (ref 55–135)
ALT SERPL W/O P-5'-P-CCNC: 14 U/L (ref 10–44)
ANION GAP SERPL CALC-SCNC: 8 MMOL/L (ref 8–16)
AST SERPL-CCNC: 16 U/L (ref 10–40)
BASOPHILS # BLD AUTO: 0.05 K/UL (ref 0–0.2)
BASOPHILS NFR BLD: 1 % (ref 0–1.9)
BILIRUB SERPL-MCNC: 0.5 MG/DL (ref 0.1–1)
BUN SERPL-MCNC: 13 MG/DL (ref 8–23)
CALCIUM SERPL-MCNC: 9.5 MG/DL (ref 8.7–10.5)
CHLORIDE SERPL-SCNC: 106 MMOL/L (ref 95–110)
CO2 SERPL-SCNC: 28 MMOL/L (ref 23–29)
CREAT SERPL-MCNC: 0.8 MG/DL (ref 0.5–1.4)
DIFFERENTIAL METHOD BLD: ABNORMAL
EOSINOPHIL # BLD AUTO: 0.2 K/UL (ref 0–0.5)
EOSINOPHIL NFR BLD: 3.3 % (ref 0–8)
ERYTHROCYTE [DISTWIDTH] IN BLOOD BY AUTOMATED COUNT: 15.4 % (ref 11.5–14.5)
EST. GFR  (NO RACE VARIABLE): >60 ML/MIN/1.73 M^2
ESTIMATED AVG GLUCOSE: 126 MG/DL (ref 68–131)
GLUCOSE SERPL-MCNC: 110 MG/DL (ref 70–110)
HBA1C MFR BLD: 6 % (ref 4–5.6)
HCT VFR BLD AUTO: 42.6 % (ref 37–48.5)
HGB BLD-MCNC: 13 G/DL (ref 12–16)
IMM GRANULOCYTES # BLD AUTO: 0.01 K/UL (ref 0–0.04)
IMM GRANULOCYTES NFR BLD AUTO: 0.2 % (ref 0–0.5)
LYMPHOCYTES # BLD AUTO: 1.9 K/UL (ref 1–4.8)
LYMPHOCYTES NFR BLD: 36.2 % (ref 18–48)
MCH RBC QN AUTO: 27.8 PG (ref 27–31)
MCHC RBC AUTO-ENTMCNC: 30.5 G/DL (ref 32–36)
MCV RBC AUTO: 91 FL (ref 82–98)
MONOCYTES # BLD AUTO: 0.5 K/UL (ref 0.3–1)
MONOCYTES NFR BLD: 9.7 % (ref 4–15)
NEUTROPHILS # BLD AUTO: 2.6 K/UL (ref 1.8–7.7)
NEUTROPHILS NFR BLD: 49.6 % (ref 38–73)
NRBC BLD-RTO: 0 /100 WBC
PLATELET # BLD AUTO: 286 K/UL (ref 150–450)
PMV BLD AUTO: 10.8 FL (ref 9.2–12.9)
POTASSIUM SERPL-SCNC: 4.2 MMOL/L (ref 3.5–5.1)
PROT SERPL-MCNC: 7.1 G/DL (ref 6–8.4)
RBC # BLD AUTO: 4.68 M/UL (ref 4–5.4)
SODIUM SERPL-SCNC: 142 MMOL/L (ref 136–145)
WBC # BLD AUTO: 5.17 K/UL (ref 3.9–12.7)

## 2024-08-28 PROCEDURE — 83036 HEMOGLOBIN GLYCOSYLATED A1C: CPT | Performed by: INTERNAL MEDICINE

## 2024-08-28 PROCEDURE — 1159F MED LIST DOCD IN RCRD: CPT | Mod: CPTII,S$GLB,, | Performed by: INTERNAL MEDICINE

## 2024-08-28 PROCEDURE — 3008F BODY MASS INDEX DOCD: CPT | Mod: CPTII,S$GLB,, | Performed by: INTERNAL MEDICINE

## 2024-08-28 PROCEDURE — 99214 OFFICE O/P EST MOD 30 MIN: CPT | Mod: S$GLB,,, | Performed by: INTERNAL MEDICINE

## 2024-08-28 PROCEDURE — G2211 COMPLEX E/M VISIT ADD ON: HCPCS | Mod: S$GLB,,, | Performed by: INTERNAL MEDICINE

## 2024-08-28 PROCEDURE — 1160F RVW MEDS BY RX/DR IN RCRD: CPT | Mod: CPTII,S$GLB,, | Performed by: INTERNAL MEDICINE

## 2024-08-28 PROCEDURE — 3078F DIAST BP <80 MM HG: CPT | Mod: CPTII,S$GLB,, | Performed by: INTERNAL MEDICINE

## 2024-08-28 PROCEDURE — 3077F SYST BP >= 140 MM HG: CPT | Mod: CPTII,S$GLB,, | Performed by: INTERNAL MEDICINE

## 2024-08-28 PROCEDURE — 3044F HG A1C LEVEL LT 7.0%: CPT | Mod: CPTII,S$GLB,, | Performed by: INTERNAL MEDICINE

## 2024-08-28 PROCEDURE — 80053 COMPREHEN METABOLIC PANEL: CPT | Performed by: INTERNAL MEDICINE

## 2024-08-28 PROCEDURE — 99999 PR PBB SHADOW E&M-EST. PATIENT-LVL III: CPT | Mod: PBBFAC,,, | Performed by: INTERNAL MEDICINE

## 2024-08-28 PROCEDURE — 85025 COMPLETE CBC W/AUTO DIFF WBC: CPT | Performed by: INTERNAL MEDICINE

## 2024-08-28 NOTE — PROGRESS NOTES
Subjective     Patient ID: Cydney Slaazar is a 64 y.o. female.    Chief Complaint: Annual Exam    HPI  Pt with HTN, Severe obesity, HLD, Pulmonary sarcoidosis, KAREY, Prediabetes is here for 6 month f/u. No acute complaints today.   Review of Systems   Constitutional:  Negative for activity change, appetite change, chills, diaphoresis, fatigue, fever and unexpected weight change.   HENT:  Negative for postnasal drip, rhinorrhea, sinus pressure/congestion, sneezing, sore throat, trouble swallowing and voice change.    Respiratory:  Negative for cough, shortness of breath and wheezing.    Cardiovascular:  Negative for chest pain, palpitations and leg swelling.   Gastrointestinal:  Negative for abdominal pain, blood in stool, constipation, diarrhea, nausea and vomiting.   Genitourinary:  Negative for dysuria.   Musculoskeletal:  Negative for arthralgias and myalgias.   Integumentary:  Negative for rash and wound.   Allergic/Immunologic: Negative for environmental allergies and food allergies.   Hematological:  Negative for adenopathy. Does not bruise/bleed easily.          Objective     Physical Exam  Constitutional:       General: She is not in acute distress.     Appearance: Normal appearance. She is well-developed. She is not diaphoretic.   HENT:      Head: Normocephalic and atraumatic.      Right Ear: External ear normal.      Left Ear: External ear normal.      Nose: Nose normal.      Mouth/Throat:      Pharynx: No oropharyngeal exudate.   Eyes:      General: No scleral icterus.        Right eye: No discharge.         Left eye: No discharge.      Conjunctiva/sclera: Conjunctivae normal.      Pupils: Pupils are equal, round, and reactive to light.   Neck:      Vascular: No JVD.   Cardiovascular:      Rate and Rhythm: Normal rate and regular rhythm.      Pulses: Normal pulses.      Heart sounds: Normal heart sounds.   Pulmonary:      Effort: Pulmonary effort is normal. No respiratory distress.      Breath sounds:  Normal breath sounds. No wheezing, rhonchi or rales.   Abdominal:      General: Bowel sounds are normal. There is no distension.      Palpations: Abdomen is soft.      Tenderness: There is no abdominal tenderness. There is no guarding or rebound.   Musculoskeletal:      Cervical back: Neck supple.      Right lower leg: No edema.      Left lower leg: No edema.   Lymphadenopathy:      Cervical: No cervical adenopathy.   Skin:     General: Skin is warm and dry.      Coloration: Skin is not pale.      Findings: No rash.   Neurological:      General: No focal deficit present.      Mental Status: She is alert and oriented to person, place, and time.      Gait: Gait normal.   Psychiatric:         Behavior: Behavior normal.         Thought Content: Thought content normal.            Assessment and Plan     1. Sarcoidosis of lung  -     CT Chest Without Contrast; Future; Expected date: 08/28/2024    2. Hyperlipidemia, unspecified hyperlipidemia type    3. KAREY (obstructive sleep apnea)    4. Severe obesity (BMI 35.0-35.9 with comorbidity)    5. Pre-diabetes    6. Essential hypertension              HTN- pt advised to increase her Hydralazine to TID(was taking qd) and continue additional antihypertensives as prescribed    -send BP logs in 2 wks for review      Severe obesity- diet/exercise stressed       HLD- continue Lipitor 40 mg daily       Pulmonary sarcoidosis- asymptomatic, has seen Pulm    -Chest CT ordered       KAREY- stable on CPAP      Prediabetes- last HA1C of 6.1(2/24)<--5.9(8/23)<--6.1(2/23)<--6.0(8/22)<--6.0(2/22)<--5.9(8/21)<--6.1(1/21)      F/u in 6 months for annual exam

## 2024-09-25 ENCOUNTER — PATIENT MESSAGE (OUTPATIENT)
Dept: INTERNAL MEDICINE | Facility: CLINIC | Age: 64
End: 2024-09-25
Payer: COMMERCIAL

## 2024-09-25 DIAGNOSIS — I10 HTN, GOAL BELOW 130/80: Primary | ICD-10-CM

## 2024-09-26 ENCOUNTER — PATIENT MESSAGE (OUTPATIENT)
Dept: INTERNAL MEDICINE | Facility: CLINIC | Age: 64
End: 2024-09-26
Payer: COMMERCIAL

## 2024-09-26 ENCOUNTER — PATIENT OUTREACH (OUTPATIENT)
Dept: ADMINISTRATIVE | Facility: HOSPITAL | Age: 64
End: 2024-09-26
Payer: COMMERCIAL

## 2024-09-26 ENCOUNTER — TELEPHONE (OUTPATIENT)
Dept: INTERNAL MEDICINE | Facility: CLINIC | Age: 64
End: 2024-09-26
Payer: COMMERCIAL

## 2024-09-26 DIAGNOSIS — I10 HTN, GOAL BELOW 130/80: Primary | ICD-10-CM

## 2024-09-26 RX ORDER — HYDRALAZINE HYDROCHLORIDE 100 MG/1
100 TABLET, FILM COATED ORAL 3 TIMES DAILY
Qty: 270 TABLET | Refills: 1 | Status: SHIPPED | OUTPATIENT
Start: 2024-09-26

## 2024-09-26 NOTE — TELEPHONE ENCOUNTER
----- Message from Marianna Casey sent at 9/26/2024  9:51 AM CDT -----  Contact: Self [phone]  Patient is returning a phone call.    Who left a message for the patient MA     Does patient know what this is regarding:  my[facility] message     Would you like a call back, or a response through your [facility] portal?:   call back    Comments: Pt would like to confirm if message came from provider or MA and have further questions.

## 2024-10-01 ENCOUNTER — PATIENT MESSAGE (OUTPATIENT)
Dept: INTERNAL MEDICINE | Facility: CLINIC | Age: 64
End: 2024-10-01
Payer: COMMERCIAL

## 2024-10-01 RX ORDER — VALSARTAN 320 MG/1
320 TABLET ORAL DAILY
Qty: 90 TABLET | Refills: 3 | Status: SHIPPED | OUTPATIENT
Start: 2024-10-01

## 2024-10-01 RX ORDER — TRIAMTERENE AND HYDROCHLOROTHIAZIDE 37.5; 25 MG/1; MG/1
1 CAPSULE ORAL EVERY MORNING
Qty: 90 CAPSULE | Refills: 3 | Status: SHIPPED | OUTPATIENT
Start: 2024-10-01

## 2024-10-01 NOTE — TELEPHONE ENCOUNTER
Numbers still high  D/c Avalide combo  Will send Dyazide and Valsartan, take in the am   Continue Atenolol and Hydralazine at current doses  Seen BP readings in 2 wks for review

## 2024-10-08 NOTE — TELEPHONE ENCOUNTER
No care due was identified.  [facility] Embedded Care Due Messages. Reference number: [id number].   10/08/2024 9:38:31 AM CDT   Sent 2 separate rx

## 2024-10-16 ENCOUNTER — PATIENT MESSAGE (OUTPATIENT)
Dept: ADMINISTRATIVE | Facility: HOSPITAL | Age: 64
End: 2024-10-16
Payer: COMMERCIAL

## 2024-10-16 ENCOUNTER — PATIENT OUTREACH (OUTPATIENT)
Dept: INTERNAL MEDICINE | Facility: CLINIC | Age: 64
End: 2024-10-16

## 2024-11-08 ENCOUNTER — TELEPHONE (OUTPATIENT)
Dept: INTERNAL MEDICINE | Facility: CLINIC | Age: 64
End: 2024-11-08
Payer: COMMERCIAL

## 2024-11-08 DIAGNOSIS — E78.5 HYPERLIPIDEMIA, UNSPECIFIED HYPERLIPIDEMIA TYPE: Primary | ICD-10-CM

## 2024-11-08 DIAGNOSIS — I10 ESSENTIAL HYPERTENSION: ICD-10-CM

## 2024-11-21 DIAGNOSIS — E78.49 OTHER HYPERLIPIDEMIA: ICD-10-CM

## 2024-11-21 RX ORDER — ATORVASTATIN CALCIUM 40 MG/1
40 TABLET, FILM COATED ORAL NIGHTLY
Qty: 90 TABLET | Refills: 3 | Status: SHIPPED | OUTPATIENT
Start: 2024-11-21

## 2024-11-21 NOTE — TELEPHONE ENCOUNTER
No care due was identified.  [facility] Embedded Care Due Messages. Reference number: [id number].   11/21/2024 2:45:29 PM CST

## 2024-11-23 ENCOUNTER — PATIENT MESSAGE (OUTPATIENT)
Dept: INTERNAL MEDICINE | Facility: CLINIC | Age: 64
End: 2024-11-23
Payer: COMMERCIAL

## 2024-11-25 RX ORDER — ATENOLOL 100 MG/1
100 TABLET ORAL DAILY
Qty: 90 TABLET | Refills: 3 | Status: SHIPPED | OUTPATIENT
Start: 2024-11-25 | End: 2025-11-25

## 2024-11-25 NOTE — TELEPHONE ENCOUNTER
Care Due:                  Date            Visit Type   Department     Provider  --------------------------------------------------------------------------------                                EP -                              PRIMARY      MET INTERNAL  Last Visit: 08-      CARE ([facility])   MEDICINE       Jasiel Middleton                              EP -                              PRIMARY      [facility] INTERNAL  Next Visit: 02-      CARE ([facility])   MEDICINE       Jasiel Middleton                                                            Last  Test          Frequency    Reason                     Performed    Due Date  --------------------------------------------------------------------------------    Lipid Panel.  12 months..  atorvastatin.............  02- 02-    [facility] Embedded Care Due Messages. Reference number: [id number].   11/25/2024 8:44:17 AM CST

## 2024-12-12 VITALS — DIASTOLIC BLOOD PRESSURE: 73 MMHG | SYSTOLIC BLOOD PRESSURE: 149 MMHG

## 2025-01-27 ENCOUNTER — NURSE TRIAGE (OUTPATIENT)
Dept: ADMINISTRATIVE | Facility: CLINIC | Age: 65
End: 2025-01-27
Payer: COMMERCIAL

## 2025-01-27 NOTE — TELEPHONE ENCOUNTER
Transferred from [facility]. States has been coughing up blood.  This started on Sunday. Was only calling about appointment. Triage process explained. States she is coughing up a clot of blood, size of nickel. This has occurred a few times. She is not coughing up phlegm. Denies CP or SOB. Triage done- dispo see provider in 3 days. Declines appointment as she feels like she needs to have her test done first. Advised I would send message to providers but for any worsening S/S, call back or ED. Verb understanding.  Reason for Disposition   More than one episode of coughing up blood and < 1 tablespoon (15 ml) of pure red blood    Additional Information   Negative: SEVERE difficulty breathing (e.g., struggling for each breath, speaks in single words)   Negative: Chest pain and difficulty breathing   Negative: Bluish (or gray) lips or face now   Negative: Passed out (e.g., fainted, lost consciousness, blacked out and was not responding)   Negative: Shock suspected (e.g., cold/pale/clammy skin, too weak to stand, low BP, rapid pulse)   Negative: Difficult to awaken or acting confused (e.g., disoriented, slurred speech)   Negative: Recent chest injury (i.e., past 24 hours)   Negative: Coughed up blood and large amount (Such as: 'a half cup of blood')   Negative: Sounds like a life-threatening emergency to the triager   Negative: MODERATE difficulty breathing (e.g., speaks in phrases, SOB even at rest, pulse 100-120) and still present when not coughing   Negative: Chest pain   Negative: Unclear to triager if the patient is coughing up blood or vomiting blood   Negative: History of prior 'blood clot' in leg or lungs (i.e., deep vein thrombosis, pulmonary embolism)   Negative: History of inherited increased risk of blood clots (e.g., Factor 5 Leiden, Anti-thrombin 3, Protein C or Protein S deficiency, Prothrombin mutation)   Negative: Pregnant or postpartum (from 0 to 6 weeks after delivery)   Negative: Hip or leg fracture  (broken bone) in past month (or had cast on leg or ankle in past month)   Negative: Long-distance travel in past month (e.g., car, bus, train, plane; with trip lasting 6 or more hours)   Negative: Bedridden (e.g., CVA, chronic illness, recovering from surgery)   Negative: Patient sounds very sick or weak to the triager   Negative: MILD difficulty breathing (e.g., minimal/no SOB at rest, SOB with walking, pulse <100) and still present when not coughing (Exception: No change from usual, chronic shortness of breath.)   Negative: Coughed up blood and > 1 tablespoon (15 ml)   Negative: Fever > 103 F (39.4 C)   Negative: Fever > 101 F (38.3 C) and age > 60 years   Negative: Fever > 100 F (37.8 C) and diabetes mellitus or weak immune system (e.g., HIV positive, cancer chemo, splenectomy, organ transplant, chronic steroids)   Negative: Has underlying lung disease (e.g., COPD, chronic bronchitis or emphysema) and sputum has turned yellow or green in color   Negative: Coughing up mazin-colored sputum   Negative: Coughing up yellow or green sputum and present > 5 days   Negative: Fever present > 3 days (72 hours)   Negative: Taking Coumadin (warfarin) or other strong blood thinner, or known bleeding disorder (e.g., thrombocytopenia)   Negative: Patient wants to be seen   Negative: Nasal discharge and present > 10 days   Negative: Cough has been present for > 3 weeks    Protocols used: Coughing Up Blood-A-OH

## 2025-01-29 ENCOUNTER — HOSPITAL ENCOUNTER (OUTPATIENT)
Dept: RADIOLOGY | Facility: HOSPITAL | Age: 65
Discharge: HOME | End: 2025-01-29
Attending: INTERNAL MEDICINE
Payer: COMMERCIAL

## 2025-01-29 DIAGNOSIS — D86.0 SARCOIDOSIS OF LUNG: ICD-10-CM

## 2025-01-29 PROCEDURE — 71250 CT THORAX DX C-: CPT | Mod: TC

## 2025-01-29 PROCEDURE — 71250 CT THORAX DX C-: CPT | Mod: 26,,, | Performed by: RADIOLOGY

## 2025-02-04 ENCOUNTER — TELEPHONE (OUTPATIENT)
Dept: INTERNAL MEDICINE | Facility: CLINIC | Age: 65
End: 2025-02-04
Payer: COMMERCIAL

## 2025-02-04 NOTE — TELEPHONE ENCOUNTER
----- Message from Ginny sent at 2/4/2025  2:44 PM CST -----  Contact: Pt  [phone]  [id number]    Type: Test Results    What test was performed? CT Scan    Who ordered the test? Dr Middleton    When and where were the test performed?  01/29/2024  [facility]     Would you like a call back and or thru [facility]: Call back    Comments: Patient would like a call back to explain the results in detail.    Please call and advise.    Thank You

## 2025-02-19 ENCOUNTER — LAB VISIT (OUTPATIENT)
Dept: LAB | Facility: HOSPITAL | Age: 65
End: 2025-02-19
Attending: INTERNAL MEDICINE
Payer: COMMERCIAL

## 2025-02-19 ENCOUNTER — OFFICE VISIT (OUTPATIENT)
Dept: INTERNAL MEDICINE | Facility: CLINIC | Age: 65
End: 2025-02-19
Payer: COMMERCIAL

## 2025-02-19 VITALS
WEIGHT: 225.63 LBS | HEART RATE: 66 BPM | OXYGEN SATURATION: 95 % | RESPIRATION RATE: 18 BRPM | BODY MASS INDEX: 37.59 KG/M2 | DIASTOLIC BLOOD PRESSURE: 82 MMHG | SYSTOLIC BLOOD PRESSURE: 146 MMHG | HEIGHT: 65 IN | TEMPERATURE: 97 F

## 2025-02-19 DIAGNOSIS — J98.4 CRLD (CHRONIC RESTRICTIVE LUNG DISEASE): ICD-10-CM

## 2025-02-19 DIAGNOSIS — I10 ESSENTIAL HYPERTENSION: ICD-10-CM

## 2025-02-19 DIAGNOSIS — E66.01 SEVERE OBESITY (BMI 35.0-35.9 WITH COMORBIDITY): ICD-10-CM

## 2025-02-19 DIAGNOSIS — Z12.31 ENCOUNTER FOR SCREENING MAMMOGRAM FOR BREAST CANCER: ICD-10-CM

## 2025-02-19 DIAGNOSIS — E78.5 HYPERLIPIDEMIA, UNSPECIFIED HYPERLIPIDEMIA TYPE: ICD-10-CM

## 2025-02-19 DIAGNOSIS — D86.9 SARCOIDOSIS: ICD-10-CM

## 2025-02-19 DIAGNOSIS — Z00.00 ANNUAL PHYSICAL EXAM: Primary | ICD-10-CM

## 2025-02-19 DIAGNOSIS — R73.03 PRE-DIABETES: ICD-10-CM

## 2025-02-19 DIAGNOSIS — D86.0 SARCOIDOSIS OF LUNG: ICD-10-CM

## 2025-02-19 DIAGNOSIS — I1A.0 RESISTANT HYPERTENSION: ICD-10-CM

## 2025-02-19 DIAGNOSIS — G47.33 OSA (OBSTRUCTIVE SLEEP APNEA): ICD-10-CM

## 2025-02-19 LAB
ALBUMIN SERPL BCP-MCNC: 3.5 G/DL (ref 3.5–5.2)
ALP SERPL-CCNC: 74 U/L (ref 40–150)
ALT SERPL W/O P-5'-P-CCNC: 16 U/L (ref 10–44)
ANION GAP SERPL CALC-SCNC: 15 MMOL/L (ref 8–16)
ANISOCYTOSIS BLD QL SMEAR: SLIGHT
AST SERPL-CCNC: 61 U/L (ref 10–40)
BASOPHILS # BLD AUTO: 0.08 K/UL (ref 0–0.2)
BASOPHILS NFR BLD: 1.2 % (ref 0–1.9)
BILIRUB SERPL-MCNC: 0.2 MG/DL (ref 0.1–1)
BUN SERPL-MCNC: 16 MG/DL (ref 8–23)
CALCIUM SERPL-MCNC: 9.4 MG/DL (ref 8.7–10.5)
CHLORIDE SERPL-SCNC: 109 MMOL/L (ref 95–110)
CHOLEST SERPL-MCNC: 198 MG/DL (ref 120–199)
CHOLEST/HDLC SERPL: 4.3 {RATIO} (ref 2–5)
CO2 SERPL-SCNC: 16 MMOL/L (ref 23–29)
CREAT SERPL-MCNC: 0.8 MG/DL (ref 0.5–1.4)
DIFFERENTIAL METHOD BLD: ABNORMAL
EOSINOPHIL # BLD AUTO: 0.2 K/UL (ref 0–0.5)
EOSINOPHIL NFR BLD: 3.5 % (ref 0–8)
ERYTHROCYTE [DISTWIDTH] IN BLOOD BY AUTOMATED COUNT: 15.3 % (ref 11.5–14.5)
EST. GFR  (NO RACE VARIABLE): >60 ML/MIN/1.73 M^2
ESTIMATED AVG GLUCOSE: 120 MG/DL (ref 68–131)
GLUCOSE SERPL-MCNC: 101 MG/DL (ref 70–110)
HBA1C MFR BLD: 5.8 % (ref 4–5.6)
HCT VFR BLD AUTO: 40.2 % (ref 37–48.5)
HDLC SERPL-MCNC: 46 MG/DL (ref 40–75)
HDLC SERPL: 23.2 % (ref 20–50)
HGB BLD-MCNC: 13.2 G/DL (ref 12–16)
IMM GRANULOCYTES # BLD AUTO: 0.07 K/UL (ref 0–0.04)
IMM GRANULOCYTES NFR BLD AUTO: 1.1 % (ref 0–0.5)
LDLC SERPL CALC-MCNC: 126.4 MG/DL (ref 63–159)
LYMPHOCYTES # BLD AUTO: 2.2 K/UL (ref 1–4.8)
LYMPHOCYTES NFR BLD: 33.3 % (ref 18–48)
MCH RBC QN AUTO: 28.9 PG (ref 27–31)
MCHC RBC AUTO-ENTMCNC: 32.8 G/DL (ref 32–36)
MCV RBC AUTO: 88 FL (ref 82–98)
MONOCYTES # BLD AUTO: 0.5 K/UL (ref 0.3–1)
MONOCYTES NFR BLD: 7.6 % (ref 4–15)
NEUTROPHILS # BLD AUTO: 3.5 K/UL (ref 1.8–7.7)
NEUTROPHILS NFR BLD: 53.3 % (ref 38–73)
NONHDLC SERPL-MCNC: 152 MG/DL
NRBC BLD-RTO: 0 /100 WBC
PLATELET # BLD AUTO: 296 K/UL (ref 150–450)
PLATELET BLD QL SMEAR: ABNORMAL
PMV BLD AUTO: 10.9 FL (ref 9.2–12.9)
POTASSIUM SERPL-SCNC: 4.6 MMOL/L (ref 3.5–5.1)
PROT SERPL-MCNC: 7.1 G/DL (ref 6–8.4)
RBC # BLD AUTO: 4.56 M/UL (ref 4–5.4)
SODIUM SERPL-SCNC: 140 MMOL/L (ref 136–145)
TRIGL SERPL-MCNC: 128 MG/DL (ref 30–150)
TSH SERPL DL<=0.005 MIU/L-ACNC: 0.45 UIU/ML (ref 0.4–4)
WBC # BLD AUTO: 6.48 K/UL (ref 3.9–12.7)

## 2025-02-19 PROCEDURE — 83036 HEMOGLOBIN GLYCOSYLATED A1C: CPT | Performed by: INTERNAL MEDICINE

## 2025-02-19 PROCEDURE — 3008F BODY MASS INDEX DOCD: CPT | Mod: CPTII,S$GLB,, | Performed by: INTERNAL MEDICINE

## 2025-02-19 PROCEDURE — 36415 COLL VENOUS BLD VENIPUNCTURE: CPT | Mod: PO | Performed by: INTERNAL MEDICINE

## 2025-02-19 PROCEDURE — 3079F DIAST BP 80-89 MM HG: CPT | Mod: CPTII,S$GLB,, | Performed by: INTERNAL MEDICINE

## 2025-02-19 PROCEDURE — 84443 ASSAY THYROID STIM HORMONE: CPT | Performed by: INTERNAL MEDICINE

## 2025-02-19 PROCEDURE — 3077F SYST BP >= 140 MM HG: CPT | Mod: CPTII,S$GLB,, | Performed by: INTERNAL MEDICINE

## 2025-02-19 PROCEDURE — 4010F ACE/ARB THERAPY RXD/TAKEN: CPT | Mod: CPTII,S$GLB,, | Performed by: INTERNAL MEDICINE

## 2025-02-19 PROCEDURE — 99999 PR PBB SHADOW E&M-EST. PATIENT-LVL IV: CPT | Mod: PBBFAC,,, | Performed by: INTERNAL MEDICINE

## 2025-02-19 PROCEDURE — 99396 PREV VISIT EST AGE 40-64: CPT | Mod: S$GLB,,, | Performed by: INTERNAL MEDICINE

## 2025-02-19 PROCEDURE — 3044F HG A1C LEVEL LT 7.0%: CPT | Mod: CPTII,S$GLB,, | Performed by: INTERNAL MEDICINE

## 2025-02-19 PROCEDURE — 80061 LIPID PANEL: CPT | Performed by: INTERNAL MEDICINE

## 2025-02-19 PROCEDURE — 80053 COMPREHEN METABOLIC PANEL: CPT | Performed by: INTERNAL MEDICINE

## 2025-02-19 PROCEDURE — 85025 COMPLETE CBC W/AUTO DIFF WBC: CPT | Performed by: INTERNAL MEDICINE

## 2025-02-19 PROCEDURE — 1159F MED LIST DOCD IN RCRD: CPT | Mod: CPTII,S$GLB,, | Performed by: INTERNAL MEDICINE

## 2025-02-19 RX ORDER — GLUCOSAMINE/CHONDRO SU A 500-400 MG
1 TABLET ORAL 3 TIMES DAILY
COMMUNITY

## 2025-02-19 RX ORDER — DOXAZOSIN 4 MG/1
4 TABLET ORAL NIGHTLY
Qty: 90 TABLET | Refills: 1 | Status: SHIPPED | OUTPATIENT
Start: 2025-02-19

## 2025-02-19 NOTE — PROGRESS NOTES
Subjective     Patient ID: Cydney Salazar is a 65 y.o. female.    Chief Complaint: Annual Exam    HPI  65 y.o. Female here for annual exam.      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;  Service: Radiology;  Laterality:                N/A;  3/27/2019: EMBOLIZATION; N/A      Comment:  Procedure: EMBOLIZATION, BLOOD VESSEL-AVM;  Surgeon:                Whit Surgeon;  Location: [facility];  Service:                Anesthesiology;  Laterality: N/A;  Room 189/[facility]  No date: ir embolization of pulmonary AVM  Social History    Socioeconomic History      Marital status:       Spouse name: Not on file      Number of children: Not on file      Years of education: Not on file      Highest education level: Not on file    Occupational History      Not on file    Tobacco Use      Smoking status: Never Smoker      Smokeless tobacco: Never Used      Tobacco comment:  smokes, usually not in the home    Substance and Sexual Activity      Alcohol use: Yes        Comment: rarely      Drug use: No      Sexual activity: Yes        Partners: Male        Birth control/protection: Post-menopausal, None    Other Topics      Concerns:        Not on file    Social History Narrative      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     Social Determinants  of Health  Financial Resource Strain:       Difficulty of Paying Living Expenses: Not on file  Food Insecurity:       Worried About Running Out of Food in the Last Year: Not on file      Ran Out of Food in the Last Year: Not on file  Transportation Needs:       Lack of Transportation (Medical): Not on file      Lack of Transportation (Non-Medical): Not on file  Physical Activity:       Days of Exercise per Week: Not on file      Minutes of Exercise per Session: Not on file  Stress:       Feeling of Stress : Not on file  Social Connections:       Frequency of Communication with Friends and Family: Not on file      Frequency of Social Gatherings with Friends and Family: Not on file      Attends Mosque Services: Not on file      Active Member of Clubs or Organizations: Not on file      Attends Club or Organization Meetings: Not on file      Marital Status: Not on file  Review of patient's allergies indicates:  No Known Allergies  Review of Systems   Constitutional:  Negative for activity change, appetite change, chills, diaphoresis, fatigue, fever and unexpected weight change.   HENT:  Negative for postnasal drip, rhinorrhea, sinus pressure/congestion, sneezing, sore throat, trouble swallowing and voice change.    Respiratory:  Negative for cough, shortness of breath and wheezing.    Cardiovascular:  Negative for chest pain, palpitations and leg swelling.   Gastrointestinal:  Negative for abdominal pain, blood in stool, constipation, diarrhea, nausea and vomiting.   Genitourinary:  Negative for dysuria.   Musculoskeletal:  Negative for arthralgias and myalgias.   Integumentary:  Negative for rash and wound.   Allergic/Immunologic: Negative for environmental allergies and food allergies.   Hematological:  Negative for adenopathy. Does not bruise/bleed easily.          Objective     Physical Exam  Vitals and nursing note reviewed.   Constitutional:       General: She is not in acute distress.     Appearance: Normal  appearance. She is well-developed. She is not diaphoretic.   HENT:      Head: Normocephalic and atraumatic.      Right Ear: External ear normal.      Left Ear: External ear normal.      Nose: Nose normal.      Mouth/Throat:      Pharynx: No oropharyngeal exudate.   Eyes:      General: No scleral icterus.        Right eye: No discharge.         Left eye: No discharge.      Conjunctiva/sclera: Conjunctivae normal.      Pupils: Pupils are equal, round, and reactive to light.   Neck:      Thyroid: No thyromegaly.      Vascular: No JVD.   Cardiovascular:      Rate and Rhythm: Normal rate and regular rhythm.      Pulses: Normal pulses.      Heart sounds: Normal heart sounds. No murmur heard.  Pulmonary:      Effort: Pulmonary effort is normal. No respiratory distress.      Breath sounds: Normal breath sounds. No wheezing, rhonchi or rales.   Chest:      Chest wall: No tenderness.   Abdominal:      General: Bowel sounds are normal. There is no distension.      Palpations: Abdomen is soft.      Tenderness: There is no abdominal tenderness. There is no guarding or rebound.   Musculoskeletal:      Cervical back: Neck supple.      Right lower leg: No edema.      Left lower leg: No edema.   Lymphadenopathy:      Cervical: No cervical adenopathy.   Skin:     General: Skin is warm and dry.      Coloration: Skin is not pale.      Findings: No rash.   Neurological:      General: No focal deficit present.      Mental Status: She is alert and oriented to person, place, and time.      Gait: Gait normal.   Psychiatric:         Behavior: Behavior normal.         Thought Content: Thought content normal.         Judgment: Judgment normal.            Assessment and Plan     1. Annual physical exam    2. Resistant hypertension  -     CV US Renal Artery Stenosis Hypertension Complete; Future  -     doxazosin (CARDURA) 4 MG tablet; Take 1 tablet (4 mg total) by mouth every evening.  Dispense: 90 tablet; Refill: 1    3. Severe obesity (BMI  35.0-35.9 with comorbidity)  -     Ambulatory referral/consult to Bariatric/Obesity Medicine; Future; Expected date: 02/26/2025    4. Essential hypertension    5. Hyperlipidemia, unspecified hyperlipidemia type    6. Sarcoidosis of lung    7. CRLD (chronic restrictive lung disease)    8. Sarcoidosis    9. KAREY (obstructive sleep apnea)    10. Pre-diabetes    11. Encounter for screening mammogram for breast cancer  -     Mammo Digital Screening Bilat w/ Raman (XPD); Future; Expected date: 04/18/2025         Blood work ordered      HTN- Add Cardura 4 mg qHS and continue all other antihypertensives as prescribed    -US to r/o JOSEPHINE   -send BP readings in 2 wks for review      Severe obesity- diet/exercise stressed    -referral to Bariatric medicine       HLD- continue Lipitor 40 mg daily       Pulmonary sarcoidosis- asymptomatic, has seen Pulm    -last chest CT(1/29/25) showed stable findings      KAREY- stable on CPAP      Prediabetes- last HA1C of 6.1(2/24)<--5.9(8/23)<--6.1(2/23)<--6.0(8/22)<--6.0(2/22)<--5.9(8/21)<--6.1(1/21)      F/u in 6 months

## 2025-02-21 ENCOUNTER — TELEPHONE (OUTPATIENT)
Dept: INTERNAL MEDICINE | Facility: CLINIC | Age: 65
End: 2025-02-21
Payer: COMMERCIAL

## 2025-02-21 DIAGNOSIS — E78.5 HYPERLIPIDEMIA, UNSPECIFIED HYPERLIPIDEMIA TYPE: Primary | ICD-10-CM

## 2025-02-21 DIAGNOSIS — R73.03 PRE-DIABETES: ICD-10-CM

## 2025-02-24 ENCOUNTER — RESULTS FOLLOW-UP (OUTPATIENT)
Dept: INTERNAL MEDICINE | Facility: CLINIC | Age: 65
End: 2025-02-24

## 2025-04-02 ENCOUNTER — HOSPITAL ENCOUNTER (OUTPATIENT)
Dept: CARDIOLOGY | Facility: HOSPITAL | Age: 65
Discharge: HOME | End: 2025-04-02
Attending: INTERNAL MEDICINE
Payer: COMMERCIAL

## 2025-04-02 DIAGNOSIS — I1A.0 RESISTANT HYPERTENSION: ICD-10-CM

## 2025-04-02 LAB
ABDOMINAL AORTA PROX EDV: 16 CM/S
ABDOMINAL AORTA PROX PSV: 90 CM/S
LEFT RENAL DIST DIAS: 10 CM/S
LEFT RENAL DIST SYS: 102 CM/S
LEFT RENAL ORIGIN DIAS: 11 CM/S
LEFT RENAL ORIGIN SYS: 166 CM/S
LEFT RENAL PROX DIAS: 22 CM/S
LEFT RENAL PROX RAR: 1.14
LEFT RENAL PROX SYS: 103 CM/S
LEFT RENAL ULTRASOUND ACCELERATION TIME MEASUREMENT 1: 0.19 MS
LEFT RENAL ULTRASOUND ACCELERATION TIME MEASUREMENT 2: 0.16 MS
LEFT RENAL ULTRASOUND ACCELERATION TIME MEASUREMENT 3: 0.06 MS
LEFT RENAL ULTRASOUND ACCELERATION TIME MEASUREMENT AVERAGE: 0.19 MS
LEFT RENAL ULTRASOUND KIDNEY SIZE MEASUREMENT 1: 9.52 CM
LEFT RENAL ULTRASOUND KIDNEY SIZE MEASUREMENT 2: 9.78 CM
LEFT RENAL ULTRASOUND KIDNEY SIZE MEASUREMENT 3: 9.63 CM
LEFT RENAL ULTRASOUND KIDNEY SIZE MEASUREMENT AVERAGE: 9.78 CM
LEFT RENAL ULTRASOUND RESISTIVE INDEX MEASUREMENT 1: 0.75
LEFT RENAL ULTRASOUND RESISTIVE INDEX MEASUREMENT 2: 0.8
LEFT RENAL ULTRASOUND RESISTIVE INDEX MEASUREMENT 3: 0.86
LEFT RENAL ULTRASOUND RESISTIVE INDEX MEASUREMENT AVERAGE: 0.86
OHS CV LEFT RENAL RAR: 1.84
OHS CV RIGHT RENAL RAR: 4.36
OHS CV US LEFT RENAL HIGHEST EDV: 22
OHS CV US LEFT RENAL HIGHEST PSV: 166
OHS CV US RIGHT RENAL HIGHEST EDV: 44
OHS CV US RIGHT RENAL HIGHEST PSV: 392
RIGHT RENAL DIST DIAS: 44 CM/S
RIGHT RENAL DIST SYS: 392 CM/S
RIGHT RENAL MID DIAS: 16 CM/S
RIGHT RENAL MID SYS: 214 CM/S
RIGHT RENAL ORIGIN DIAS: 23 CM/S
RIGHT RENAL ORIGIN SYS: 245 CM/S
RIGHT RENAL PROX DIAS: 35 CM/S
RIGHT RENAL PROX RAR: 1.74
RIGHT RENAL PROX SYS: 157 CM/S
RIGHT RENAL ULTRASOUND ACCELERATION TIME MEASUREMENT 1: 0.15 MS
RIGHT RENAL ULTRASOUND ACCELERATION TIME MEASUREMENT 2: 0.15 MS
RIGHT RENAL ULTRASOUND ACCELERATION TIME MEASUREMENT 3: 0.14 MS
RIGHT RENAL ULTRASOUND ACCELERATION TIME MEASUREMENT AVERAGE: 0.15 MS
RIGHT RENAL ULTRASOUND KIDNEY SIZE MEASUREMENT 1: 9.4 CM
RIGHT RENAL ULTRASOUND KIDNEY SIZE MEASUREMENT 2: 9.75 CM
RIGHT RENAL ULTRASOUND KIDNEY SIZE MEASUREMENT 3: 9.81 CM
RIGHT RENAL ULTRASOUND KIDNEY SIZE MEASUREMENT AVERAGE: 9.81 CM
RIGHT RENAL ULTRASOUND RESISTIVE INDEX MEASUREMENT 1: 0.75
RIGHT RENAL ULTRASOUND RESISTIVE INDEX MEASUREMENT 2: 0.63
RIGHT RENAL ULTRASOUND RESISTIVE INDEX MEASUREMENT 3: 0.89
RIGHT RENAL ULTRASOUND RESISTIVE INDEX MEASUREMENT AVERAGE: 0.89

## 2025-04-02 PROCEDURE — 93975 VASCULAR STUDY: CPT | Mod: PO

## 2025-04-02 PROCEDURE — 93975 VASCULAR STUDY: CPT | Mod: 26,,, | Performed by: INTERNAL MEDICINE

## 2025-04-07 ENCOUNTER — RESULTS FOLLOW-UP (OUTPATIENT)
Dept: INTERNAL MEDICINE | Facility: CLINIC | Age: 65
End: 2025-04-07
Payer: COMMERCIAL

## 2025-04-07 DIAGNOSIS — I70.1 RAS (RENAL ARTERY STENOSIS): Primary | ICD-10-CM

## 2025-04-09 ENCOUNTER — OFFICE VISIT (OUTPATIENT)
Dept: PULMONOLOGY | Facility: CLINIC | Age: 65
End: 2025-04-09
Payer: COMMERCIAL

## 2025-04-09 ENCOUNTER — LAB VISIT (OUTPATIENT)
Dept: LAB | Facility: HOSPITAL | Age: 65
End: 2025-04-09
Attending: STUDENT IN AN ORGANIZED HEALTH CARE EDUCATION/TRAINING PROGRAM
Payer: COMMERCIAL

## 2025-04-09 VITALS
DIASTOLIC BLOOD PRESSURE: 62 MMHG | OXYGEN SATURATION: 94 % | HEIGHT: 65 IN | SYSTOLIC BLOOD PRESSURE: 140 MMHG | WEIGHT: 223.75 LBS | HEART RATE: 71 BPM | BODY MASS INDEX: 37.28 KG/M2

## 2025-04-09 DIAGNOSIS — Q25.72 PULMONARY ARTERIOVENOUS MALFORMATION: ICD-10-CM

## 2025-04-09 DIAGNOSIS — E66.01 SEVERE OBESITY (BMI 35.0-35.9 WITH COMORBIDITY): ICD-10-CM

## 2025-04-09 DIAGNOSIS — D86.0 SARCOIDOSIS OF LUNG: Primary | ICD-10-CM

## 2025-04-09 DIAGNOSIS — Q27.30 ARTERIOVENOUS MALFORMATION: ICD-10-CM

## 2025-04-09 LAB
CREAT SERPL-MCNC: 0.8 MG/DL (ref 0.5–1.4)
GFR SERPLBLD CREATININE-BSD FMLA CKD-EPI: >60 ML/MIN/1.73/M2

## 2025-04-09 PROCEDURE — 99214 OFFICE O/P EST MOD 30 MIN: CPT | Mod: S$GLB,,, | Performed by: STUDENT IN AN ORGANIZED HEALTH CARE EDUCATION/TRAINING PROGRAM

## 2025-04-09 PROCEDURE — 3078F DIAST BP <80 MM HG: CPT | Mod: CPTII,S$GLB,, | Performed by: STUDENT IN AN ORGANIZED HEALTH CARE EDUCATION/TRAINING PROGRAM

## 2025-04-09 PROCEDURE — 4010F ACE/ARB THERAPY RXD/TAKEN: CPT | Mod: CPTII,S$GLB,, | Performed by: STUDENT IN AN ORGANIZED HEALTH CARE EDUCATION/TRAINING PROGRAM

## 2025-04-09 PROCEDURE — 36415 COLL VENOUS BLD VENIPUNCTURE: CPT

## 2025-04-09 PROCEDURE — 3044F HG A1C LEVEL LT 7.0%: CPT | Mod: CPTII,S$GLB,, | Performed by: STUDENT IN AN ORGANIZED HEALTH CARE EDUCATION/TRAINING PROGRAM

## 2025-04-09 PROCEDURE — 99999 PR PBB SHADOW E&M-EST. PATIENT-LVL III: CPT | Mod: PBBFAC,,, | Performed by: STUDENT IN AN ORGANIZED HEALTH CARE EDUCATION/TRAINING PROGRAM

## 2025-04-09 PROCEDURE — 1159F MED LIST DOCD IN RCRD: CPT | Mod: CPTII,S$GLB,, | Performed by: STUDENT IN AN ORGANIZED HEALTH CARE EDUCATION/TRAINING PROGRAM

## 2025-04-09 PROCEDURE — 82565 ASSAY OF CREATININE: CPT

## 2025-04-09 PROCEDURE — 3008F BODY MASS INDEX DOCD: CPT | Mod: CPTII,S$GLB,, | Performed by: STUDENT IN AN ORGANIZED HEALTH CARE EDUCATION/TRAINING PROGRAM

## 2025-04-09 PROCEDURE — 3077F SYST BP >= 140 MM HG: CPT | Mod: CPTII,S$GLB,, | Performed by: STUDENT IN AN ORGANIZED HEALTH CARE EDUCATION/TRAINING PROGRAM

## 2025-04-09 RX ORDER — IRBESARTAN AND HYDROCHLOROTHIAZIDE 300; 12.5 MG/1; MG/1
1 TABLET, FILM COATED ORAL EVERY MORNING
COMMUNITY
Start: 2024-11-14

## 2025-04-09 NOTE — PROGRESS NOTES
"Subjective:     Reason for visit:  Coughing up blood    Patient ID:  Cydney Salazar is a 65 y.o. female with HTN, KAREY and sarcoidosis    Patient is new to me.  Last seen 06/30/2021.  At that time shortness of breath was improved and she was feeling well.  She did have some sinusitis which was associated with mild epistaxis but this improved with H1 antagonist and Flonase.      Recurrent episodes of hemoptysis in June of 2016.  CTA showing AVM in the RML s/p embolization without any further issues of hemoptysis.  Repeat angiogram with IR showing no residual pulmonary AVM    Sarcoid diagnosed 20 years ago at [facility].  Prednisone for about 11 years but no additional therapy.      Interval History:  Hemoptysis since Jan. Not positiional.  Gurgling in the chest then cough it up.  Similar as it was in the past.  Also reporting more shortness of breath with exertion.  Has been more sedentary and not moving around much due to her shortness of breath.    Additional Pulmonary History:  Childhood Illnesses:  none  Occupational:  Works at [email or web address] for past 40 years  Environmental:  cats  Tobacco/Smoking:  Never    Objective:     Vitals:    04/09/25 0935   BP: (!) 140/62   BP Location: Right arm   Patient Position: Sitting   Pulse: 71   SpO2: (!) 94%   Weight: 101.5 kg (223 lb 12.3 oz)   Height: 5' 5" (1.651 m)         Physical Exam  Vitals and nursing note reviewed.   Constitutional:       General: She is not in acute distress.     Appearance: She is obese. She is not ill-appearing, toxic-appearing or diaphoretic.   HENT:      Head: Normocephalic and atraumatic.      Nose: No rhinorrhea.      Mouth/Throat:      Mouth: Mucous membranes are moist.   Eyes:      General: No scleral icterus.     Extraocular Movements: Extraocular movements intact.   Cardiovascular:      Rate and Rhythm: Normal rate and regular rhythm.   Pulmonary:      Effort: Pulmonary effort is normal. No tachypnea, accessory muscle usage, respiratory " distress or retractions.      Breath sounds: Normal breath sounds. No wheezing or rhonchi. Rales: in the right base.  Abdominal:      General: There is no distension.   Skin:     General: Skin is warm and dry.      Coloration: Skin is not jaundiced.      Findings: No rash.   Neurological:      General: No focal deficit present.      Mental Status: Mental status is at baseline.          Personal Diagnostic Review and Interpretation  01/29/2025 CT chest:  Biapical scarring with mosaic attenuation with scattered nodules bilaterally; RML embolization material consistent with previous AVM treatment; stable tubular opacity in the periphery of the RML since 2018; unchanged increased soft tissue fullness around the bronchus intermedius since 2019 12/14/2016 CTA chest:  1 cm soft tissue collection in the RML with hypertrophy draining veins and hypertrophied supplying arteries concerning for AVM.    Pertinent Studies Reviewed & Interpreted:     Pulmonary Function Tests:   06/30/2021:  FEV1 55, FVC 61, FEV1/FVC 73, FEF 35; 8% at 111 cc FEV1, but 24% change in mid flows.  TLC 64, ERV 8, RV/.  DLCO 93    04/16/2019: FEV1 58, FVC 64, FEV1/FVC 73, FEF 42; no bronchodilator response.  TLC 65, ERV 5, RV/.  DLCO 60    05/09/2018:  FEV1 64, FVC 67, FEV1/FVC 78, FEF 62; no bronchodilator response.  TLC 64, ERV 7, RV/TLC 97.  DLCO 77      6 Minute Walk Tests:   06/30/2021: 457 m (304 m).  90% on room air at peak exercise.  Appropriate tachycardia but grossly hypertensive throughout ()    04/16/2019: 406 m.  90% on room air at peak exercise.  Appropriate tachycardia but hypertensive at peak exercise ()      Echocardiograms:   01/28/2019: EF 60%, JOSHUA 34; PASP 60.  No shunt on agitated saline      Other Pertinent Laboratories:  [facility] 100-300      Assessment & Plan:       Problem List Items Addressed This Visit          Pulmonary    Sarcoidosis of lung - Primary    Overview   Sarcoid diagnosed 20 years ago at  [facility].  Prednisone for about 11 years but no additional therapy.  Stable appearing on CT.  Moderate restriction on 2021 PFTs         Current Assessment & Plan   - PFTs at next visit  - 6 MWT at next visit         Relevant Orders    CTA Chest Non-Coronary (PE Studies)    Stress test, pulmonary    DLCO-Carbon Monoxide Diffusing Capacity    Lung Volumes    Spirometry with/without bronchodilator    Echo       Cardiac/Vascular    Pulmonary arteriovenous malformation    Overview   Recurrent episodes of hemoptysis in June of 2016.  CTA showing AVM in the RML s/p embolization without any further issues of hemoptysis.  Repeat angiogram with IR showing no residual pulmonary AVM.      Now with hemoptysis since January that has since resolved.  Recent CT chest with tubular opacity in the RML and increased soft tissue fullness around the bronchus, both of which appears stable.           Current Assessment & Plan   - CTA chest to better characterize vascular lesions  - IR referral for discussion for embolization if AVM present given recent history of hemoptysis            Endocrine    Severe obesity (BMI 35.0-35.9 with comorbidity)    Overview   Certainly contributing to her shortness of breath.  Counseled extensively on the need for weight loss             RETURN TO CLINIC IN 2 MONTHS       Portions of the record may have been created with voice-recognition software. Occasional wrong-word or sound-a-like substitutions may have occurred due to the inherent limitations of voice-recognition software. Read the chart carefully and recognize, using context, where substitutions have occurred.

## 2025-04-09 NOTE — ASSESSMENT & PLAN NOTE
- CTA chest to better characterize vascular lesions  - IR referral for discussion for embolization if AVM present given recent history of hemoptysis

## 2025-04-09 NOTE — ASSESSMENT & PLAN NOTE
Sarcoid diagnosed 20 years ago at [facility].  Prednisone for about 11 years but no additional therapy.

## 2025-04-11 ENCOUNTER — TELEPHONE (OUTPATIENT)
Dept: VASCULAR SURGERY | Facility: CLINIC | Age: 65
End: 2025-04-11
Payer: COMMERCIAL

## 2025-04-11 NOTE — TELEPHONE ENCOUNTER
Attempted to contact the pt in reference to change in time of scheduled appt but no answer. Left a voice message with a call back number [phone] for further discussion.

## 2025-04-14 ENCOUNTER — TELEPHONE (OUTPATIENT)
Dept: VASCULAR SURGERY | Facility: CLINIC | Age: 65
End: 2025-04-14
Payer: COMMERCIAL

## 2025-04-14 DIAGNOSIS — I70.1 RENAL ARTERY STENOSIS: Primary | ICD-10-CM

## 2025-04-14 NOTE — TELEPHONE ENCOUNTER
Received a message from the pt requesting to be seen at a later date due to multiple appointments on that day.Spoke with the pt and appt rescheduled.Pt verbalized understanding of information received.

## 2025-04-23 ENCOUNTER — HOSPITAL ENCOUNTER (OUTPATIENT)
Dept: RADIOLOGY | Facility: HOSPITAL | Age: 65
Discharge: HOME | End: 2025-04-23
Attending: INTERNAL MEDICINE
Payer: COMMERCIAL

## 2025-04-23 DIAGNOSIS — Z12.31 ENCOUNTER FOR SCREENING MAMMOGRAM FOR BREAST CANCER: ICD-10-CM

## 2025-04-23 PROCEDURE — 77063 BREAST TOMOSYNTHESIS BI: CPT | Mod: 26,,, | Performed by: RADIOLOGY

## 2025-04-23 PROCEDURE — 77063 BREAST TOMOSYNTHESIS BI: CPT | Mod: TC

## 2025-04-23 PROCEDURE — 77067 SCR MAMMO BI INCL CAD: CPT | Mod: 26,,, | Performed by: RADIOLOGY

## 2025-04-30 ENCOUNTER — HOSPITAL ENCOUNTER (OUTPATIENT)
Dept: CARDIOLOGY | Facility: HOSPITAL | Age: 65
Discharge: HOME | End: 2025-04-30
Attending: STUDENT IN AN ORGANIZED HEALTH CARE EDUCATION/TRAINING PROGRAM
Payer: COMMERCIAL

## 2025-04-30 VITALS
DIASTOLIC BLOOD PRESSURE: 78 MMHG | BODY MASS INDEX: 37.15 KG/M2 | HEIGHT: 65 IN | SYSTOLIC BLOOD PRESSURE: 138 MMHG | HEART RATE: 69 BPM | WEIGHT: 223 LBS

## 2025-04-30 DIAGNOSIS — D86.0 SARCOIDOSIS OF LUNG: ICD-10-CM

## 2025-04-30 LAB
ASCENDING AORTA: 2.9 CM
AV AREA BY CONTINUOUS VTI: 2.2 CM2
AV INDEX (PROSTH): 0.71
AV LVOT MEAN GRADIENT: 2 MMHG
AV LVOT PEAK GRADIENT: 4 MMHG
AV MEAN GRADIENT: 5 MMHG
AV PEAK GRADIENT: 8 MMHG
AV VALVE AREA BY VELOCITY RATIO: 2.2 CM²
AV VALVE AREA: 2.2 CM2
AV VELOCITY RATIO: 0.71
BSA FOR ECHO PROCEDURE: 2.15 M2
CV ECHO LV RWT: 0.48 CM
DOP CALC AO PEAK VEL: 1.4 M/S
DOP CALC AO VTI: 36.7 CM
DOP CALC LVOT AREA: 3.1 CM2
DOP CALC LVOT DIAMETER: 2 CM
DOP CALC LVOT PEAK VEL: 1 M/S
DOP CALC LVOT STROKE VOLUME: 81.3 CM3
DOP CALCLVOT PEAK VEL VTI: 25.9 CM
E WAVE DECELERATION TIME: 272 MS
E/A RATIO: 1.31
E/E' RATIO: 19 M/S
ECHO EF ESTIMATED: 79 %
ECHO LV POSTERIOR WALL: 1 CM (ref 0.6–1.1)
FRACTIONAL SHORTENING: 47.6 % (ref 28–44)
INTERVENTRICULAR SEPTUM: 1.1 CM (ref 0.6–1.1)
IVC DIAMETER: 1.76 CM
LA MAJOR: 5.9 CM
LA MINOR: 5.4 CM
LA WIDTH: 4.2 CM
LEFT ATRIUM SIZE: 4 CM
LEFT ATRIUM VOLUME INDEX MOD: 38 ML/M2
LEFT ATRIUM VOLUME INDEX: 39 ML/M2
LEFT ATRIUM VOLUME MOD: 79 ML
LEFT ATRIUM VOLUME: 81 CM3
LEFT INTERNAL DIMENSION IN SYSTOLE: 2.2 CM (ref 2.1–4)
LEFT VENTRICLE DIASTOLIC VOLUME INDEX: 37.2 ML/M2
LEFT VENTRICLE DIASTOLIC VOLUME: 77 ML
LEFT VENTRICLE MASS INDEX: 71 G/M2
LEFT VENTRICLE SYSTOLIC VOLUME INDEX: 7.7 ML/M2
LEFT VENTRICLE SYSTOLIC VOLUME: 16 ML
LEFT VENTRICULAR INTERNAL DIMENSION IN DIASTOLE: 4.2 CM (ref 3.5–6)
LEFT VENTRICULAR MASS: 147 G
LV LATERAL E/E' RATIO: 17.6
LV SEPTAL E/E' RATIO: 20.1
MV PEAK A VEL: 1.08 M/S
MV PEAK E VEL: 1.41 M/S
OHS CV RV/LV RATIO: 0.88 CM
PISA TR MAX VEL: 3 M/S
RA MAJOR: 5.12 CM
RA PRESSURE ESTIMATED: 3 MMHG
RA WIDTH: 3.42 CM
RIGHT ATRIAL AREA: 16.6 CM2
RIGHT VENTRICLE DIASTOLIC BASEL DIMENSION: 3.7 CM
RV TB RVSP: 6 MMHG
RV TISSUE DOPPLER FREE WALL SYSTOLIC VELOCITY 1 (APICAL 4 CHAMBER VIEW): 11.8 CM/S
SINUS: 2.88 CM
STJ: 2.6 CM
TDI LATERAL: 0.08 M/S
TDI SEPTAL: 0.07 M/S
TDI: 0.08 M/S
TRICUSPID ANNULAR PLANE SYSTOLIC EXCURSION: 2.8 CM
TV PEAK GRADIENT: 35 MMHG
TV REST PULMONARY ARTERY PRESSURE: 39 MMHG
Z-SCORE OF LEFT VENTRICULAR DIMENSION IN END DIASTOLE: -4.04
Z-SCORE OF LEFT VENTRICULAR DIMENSION IN END SYSTOLE: -4.43

## 2025-04-30 PROCEDURE — 93306 TTE W/DOPPLER COMPLETE: CPT

## 2025-04-30 PROCEDURE — 93306 TTE W/DOPPLER COMPLETE: CPT | Mod: 26,,, | Performed by: INTERNAL MEDICINE

## 2025-05-12 ENCOUNTER — TELEPHONE (OUTPATIENT)
Dept: VASCULAR SURGERY | Facility: CLINIC | Age: 65
End: 2025-05-12
Payer: COMMERCIAL

## 2025-05-12 NOTE — TELEPHONE ENCOUNTER
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

## 2025-05-14 ENCOUNTER — OFFICE VISIT (OUTPATIENT)
Dept: OBSTETRICS AND GYNECOLOGY | Facility: CLINIC | Age: 65
End: 2025-05-14
Attending: OBSTETRICS & GYNECOLOGY
Payer: COMMERCIAL

## 2025-05-14 ENCOUNTER — HOSPITAL ENCOUNTER (OUTPATIENT)
Dept: RADIOLOGY | Facility: HOSPITAL | Age: 65
Discharge: HOME | End: 2025-05-14
Attending: STUDENT IN AN ORGANIZED HEALTH CARE EDUCATION/TRAINING PROGRAM
Payer: COMMERCIAL

## 2025-05-14 VITALS
HEIGHT: 65 IN | DIASTOLIC BLOOD PRESSURE: 70 MMHG | SYSTOLIC BLOOD PRESSURE: 130 MMHG | BODY MASS INDEX: 36.22 KG/M2 | WEIGHT: 217.38 LBS

## 2025-05-14 DIAGNOSIS — Z01.419 WELL FEMALE EXAM WITH ROUTINE GYNECOLOGICAL EXAM: Primary | ICD-10-CM

## 2025-05-14 DIAGNOSIS — Q27.30 ARTERIOVENOUS MALFORMATION: ICD-10-CM

## 2025-05-14 DIAGNOSIS — D86.0 SARCOIDOSIS OF LUNG: ICD-10-CM

## 2025-05-14 PROCEDURE — 25500020 PHARM REV CODE 255: Performed by: STUDENT IN AN ORGANIZED HEALTH CARE EDUCATION/TRAINING PROGRAM

## 2025-05-14 PROCEDURE — 3075F SYST BP GE 130 - 139MM HG: CPT | Mod: CPTII,S$GLB,, | Performed by: OBSTETRICS & GYNECOLOGY

## 2025-05-14 PROCEDURE — 71275 CT ANGIOGRAPHY CHEST: CPT | Mod: TC

## 2025-05-14 PROCEDURE — 1160F RVW MEDS BY RX/DR IN RCRD: CPT | Mod: CPTII,S$GLB,, | Performed by: OBSTETRICS & GYNECOLOGY

## 2025-05-14 PROCEDURE — 3078F DIAST BP <80 MM HG: CPT | Mod: CPTII,S$GLB,, | Performed by: OBSTETRICS & GYNECOLOGY

## 2025-05-14 PROCEDURE — 99999 PR PBB SHADOW E&M-EST. PATIENT-LVL IV: CPT | Mod: PBBFAC,,, | Performed by: OBSTETRICS & GYNECOLOGY

## 2025-05-14 PROCEDURE — 71275 CT ANGIOGRAPHY CHEST: CPT | Mod: 26,,, | Performed by: RADIOLOGY

## 2025-05-14 PROCEDURE — 4010F ACE/ARB THERAPY RXD/TAKEN: CPT | Mod: CPTII,S$GLB,, | Performed by: OBSTETRICS & GYNECOLOGY

## 2025-05-14 PROCEDURE — 3008F BODY MASS INDEX DOCD: CPT | Mod: CPTII,S$GLB,, | Performed by: OBSTETRICS & GYNECOLOGY

## 2025-05-14 PROCEDURE — 3044F HG A1C LEVEL LT 7.0%: CPT | Mod: CPTII,S$GLB,, | Performed by: OBSTETRICS & GYNECOLOGY

## 2025-05-14 PROCEDURE — 99396 PREV VISIT EST AGE 40-64: CPT | Mod: S$GLB,,, | Performed by: OBSTETRICS & GYNECOLOGY

## 2025-05-14 PROCEDURE — 1159F MED LIST DOCD IN RCRD: CPT | Mod: CPTII,S$GLB,, | Performed by: OBSTETRICS & GYNECOLOGY

## 2025-05-14 RX ADMIN — IOHEXOL 100 ML: 350 INJECTION, SOLUTION INTRAVENOUS at 08:05

## 2025-05-14 NOTE — PROGRESS NOTES
SUBJECTIVE:   65 y.o. female   for routine gyn exam. No LMP recorded (lmp unknown). Patient is postmenopausal..  She has no unusual complaints. No HRT. 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 ENDO (2ND FLR);  Service: Endoscopy;  Laterality: N/A;  PA-60  instr mailed-GT    EMBOLIZATION N/A 2018    Procedure: Embolization;  Surgeon: Whit Surgeon;  Location: [facility];  Service: Radiology;  Laterality: N/A;    EMBOLIZATION N/A 2019    Procedure: EMBOLIZATION, BLOOD VESSEL-AVM;  Surgeon: Whit Surgeon;  Location: [facility];  Service: Anesthesiology;  Laterality: N/A;  Room 189/[place]    ir embolization of pulmonary AVM       Social History[1]  Family History   Problem Relation Name Age of Onset    Glaucoma Mother      Hyperlipidemia Mother      Diabetes Daughter      Stroke Father      Diabetes Father      Hyperlipidemia Father      No Known Problems Sister      No Known Problems Brother      No Known Problems Maternal Aunt      No Known Problems Maternal Uncle      No Known Problems Paternal Aunt      No Known Problems Paternal Uncle      No Known Problems Maternal Grandmother      No Known Problems Maternal Grandfather      No Known Problems Paternal Grandmother      No Known Problems Paternal Grandfather      Amblyopia Neg Hx      Blindness Neg Hx      Cancer Neg Hx      Cataracts Neg Hx      Hypertension Neg Hx      Macular degeneration Neg Hx      Retinal detachment Neg Hx      Strabismus Neg Hx      Thyroid disease Neg Hx      Breast cancer Neg Hx      Colon cancer Neg Hx      Ovarian  "cancer Neg Hx       OB History    Para Term  AB Living   1 1 0 0 0 1   SAB IAB Ectopic Multiple Live Births   0 0 0 0 1      # Outcome Date GA Lbr Jonathon/2nd Weight Sex Type Anes PTL Lv   1 Para      CS-Unspec   MINDY         Current Medications[2]  Allergies: Patient has no known allergies.     ROS:  Constitutional: no weight loss, weight gain, fever, fatigue  Eyes:  No vision changes, glasses/contacts  ENT/Mouth: No ulcers, sinus problems, ears ringing, headache  Cardiovascular: No inability to lie flat, chest pain, exercise intolerance, swelling, heart palpitations  Respiratory: No wheezing, coughing blood, shortness of breath, or cough  Gastrointestinal: No diarrhea, bloody stool, nausea/vomiting, constipation, gas, hemorrhoids  Genitourinary: No blood in urine, painful urination, urgency of urination, frequency of urination, incomplete emptying, incontinence, abnormal bleeding, painful periods, heavy periods, vaginal discharge, vaginal odor, painful intercourse, sexual problems, bleeding after intercourse.  Musculoskeletal: No muscle weakness  Skin/Breast: No painful breasts, nipple discharge, masses, rash, ulcers  Neurological: No passing out, seizures, numbness, headache  Endocrine: No diabetes, hypothyroid, hyperthyroid, hot flashes, hair loss, abnormal hair growth, acne  Psychiatric: No depression, crying  Hematologic: No bruises, bleeding, swollen lymph nodes, anemia.      OBJECTIVE:   The patient appears well, alert, oriented x 3, in no distress.  /70 (BP Location: Right arm, Patient Position: Sitting)   Ht 5' 5" (1.651 m)   Wt 98.6 kg (217 lb 6 oz)   LMP  (LMP Unknown)   BMI 36.17 kg/m²   NECK: no thyromegaly, trachea midline  SKIN: no acne, striae, hirsutism  CHEST: CTAB  CV: RRR  BREAST EXAM: breasts appear normal, no suspicious masses, no skin or nipple changes or axillary nodes  ABDOMEN: no hernias, masses, or hepatosplenomegaly  GENITALIA: normal external genitalia, no erythema, " no discharge  URETHRA: normal urethra, normal urethral meatus  VAGINA: Normal  CERVIX: no lesions or cervical motion tenderness  UTERUS: normal size, contour, position, consistency, mobility, non-tender  ADNEXA: no mass, fullness, tenderness      ASSESSMENT:   1. Well female exam with routine gynecological exam            PLAN:      Discussed healthy lifestyle including regular exercise, healthy eating, etc.  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   [2]   Current Outpatient Medications   Medication Sig Dispense Refill    atenoloL (TENORMIN) 100 MG tablet Take 1 tablet (100 mg total) by mouth once daily. 90 tablet 3    atorvastatin (LIPITOR) 40 MG tablet Take 1 tablet (40 mg total) by mouth every evening. 90 tablet 3    doxazosin (CARDURA) 4 MG tablet Take 1 tablet (4 mg total) by mouth every evening. 90 tablet 1    glucosamine-chondroitin 500-400 mg tablet Take 1 tablet by mouth 3 (three) times daily.      hydrALAZINE (APRESOLINE) 100 MG tablet Take 1 tablet (100 mg total) by mouth 3 (three) times daily. 270 tablet 1    irbesartan-hydrochlorothiazide (AVALIDE) 300-12.5 mg per tablet Take 1 tablet by mouth every morning.      multivitamin capsule Take 1 capsule by mouth once daily.      timolol maleate 0.5% (TIMOPTIC) 0.5 % Drop Place 1 drop into both eyes 2 (two) times daily. 15 mL 6    triamterene-hydrochlorothiazide 37.5-25 mg (DYAZIDE) 37.5-25 mg per capsule Take 1 capsule by mouth every morning. 90 capsule 3    valsartan  (DIOVAN) 320 MG tablet Take 1 tablet (320 mg total) by mouth once daily. 90 tablet 3     No current facility-administered medications for this visit.

## 2025-05-15 ENCOUNTER — PATIENT OUTREACH (OUTPATIENT)
Dept: ADMINISTRATIVE | Facility: HOSPITAL | Age: 65
End: 2025-05-15
Payer: COMMERCIAL

## 2025-05-19 ENCOUNTER — RESULTS FOLLOW-UP (OUTPATIENT)
Dept: PULMONOLOGY | Facility: CLINIC | Age: 65
End: 2025-05-19
Payer: COMMERCIAL

## 2025-05-19 ENCOUNTER — PATIENT OUTREACH (OUTPATIENT)
Dept: ADMINISTRATIVE | Facility: HOSPITAL | Age: 65
End: 2025-05-19
Payer: COMMERCIAL

## 2025-05-19 VITALS — SYSTOLIC BLOOD PRESSURE: 130 MMHG | DIASTOLIC BLOOD PRESSURE: 70 MMHG

## 2025-05-21 ENCOUNTER — OFFICE VISIT (OUTPATIENT)
Dept: PULMONOLOGY | Facility: CLINIC | Age: 65
End: 2025-05-21
Payer: COMMERCIAL

## 2025-05-21 ENCOUNTER — HOSPITAL ENCOUNTER (OUTPATIENT)
Dept: PULMONOLOGY | Facility: CLINIC | Age: 65
Discharge: HOME | End: 2025-05-21
Payer: COMMERCIAL

## 2025-05-21 VITALS
SYSTOLIC BLOOD PRESSURE: 149 MMHG | HEIGHT: 65 IN | HEART RATE: 67 BPM | WEIGHT: 217 LBS | WEIGHT: 217 LBS | OXYGEN SATURATION: 96 % | DIASTOLIC BLOOD PRESSURE: 74 MMHG | HEIGHT: 65 IN | BODY MASS INDEX: 36.15 KG/M2 | BODY MASS INDEX: 36.15 KG/M2

## 2025-05-21 DIAGNOSIS — E66.01 SEVERE OBESITY (BMI 35.0-35.9 WITH COMORBIDITY): ICD-10-CM

## 2025-05-21 DIAGNOSIS — D86.0 SARCOIDOSIS OF LUNG: ICD-10-CM

## 2025-05-21 DIAGNOSIS — Q25.72 PULMONARY ARTERIOVENOUS MALFORMATION: ICD-10-CM

## 2025-05-21 DIAGNOSIS — D86.0 SARCOIDOSIS OF LUNG: Primary | ICD-10-CM

## 2025-05-21 PROBLEM — J98.4 CRLD (CHRONIC RESTRICTIVE LUNG DISEASE): Status: RESOLVED | Noted: 2018-05-09 | Resolved: 2025-05-21

## 2025-05-21 PROBLEM — R91.8 PULMONARY INFILTRATES ON CXR: Status: RESOLVED | Noted: 2018-07-10 | Resolved: 2025-05-21

## 2025-05-21 PROBLEM — R09.1 PLEURISY: Status: RESOLVED | Noted: 2018-07-10 | Resolved: 2025-05-21

## 2025-05-21 LAB
DLCO SINGLE BREATH LLN: 17.19
DLCO SINGLE BREATH PRE REF: 37.1 %
DLCO SINGLE BREATH REF: 22.93
DLCOC SBVA LLN: 3.07
DLCOC SBVA REF: 4.49
DLCOC SINGLE BREATH LLN: 17.19
DLCOC SINGLE BREATH REF: 22.93
DLCOCSBVAULN: 5.91
DLCOCSINGLEBREATHULN: 28.66
DLCOSINGLEBREATHULN: 28.66
DLCOSINGLEBREATHZSCORE: -4.14
DLCOVA LLN: 3.07
DLCOVA PRE REF: 64.3 %
DLCOVA PRE: 2.89 ML/(MIN*MMHG*L) (ref 3.07–5.91)
DLCOVA REF: 4.49
DLCOVAULN: 5.91
ERV LLN: -16449.24
ERV PRE REF: 35.3 %
ERV REF: 0.76
ERVULN: ABNORMAL
FEF 25 75 LLN: 1.44
FEF 25 75 PRE REF: 32.6 %
FEF 25 75 REF: 2.84
FET100 CHG: 2.1 %
FEV05 LLN: 0.99
FEV05 REF: 1.85
FEV1 CHG: 4.3 %
FEV1 FVC LLN: 67
FEV1 FVC PRE REF: 97.3 %
FEV1 FVC REF: 79
FEV1 LLN: 1.54
FEV1 PRE REF: 53.1 %
FEV1 REF: 2.14
FEV1 VOL CHG: 0.05
FEV1FVCZSCORE: -0.32
FEV1ZSCORE: -2.7
FRCPLETH LLN: 1.94
FRCPLETH PREREF: 81.7 %
FRCPLETH REF: 2.76
FRCPLETHULN: 3.58
FVC CHG: 11.9 %
FVC LLN: 1.98
FVC PRE REF: 54.3 %
FVC REF: 2.71
FVC VOL CHG: 0.17
FVCZSCORE: -2.83
IVC PRE: 1.81 L (ref 1.98–3.49)
IVC SINGLE BREATH LLN: 1.98
IVC SINGLE BREATH PRE REF: 66.6 %
IVC SINGLE BREATH REF: 2.71
IVCSINGLEBREATHULN: 3.49
LLN IC: -16447.86
PEF LLN: 3.49
PEF PRE REF: 93.9 %
PEF REF: 5.56
PHYSICIAN COMMENT: ABNORMAL
POST FEF 25 75: 0.71 L/S (ref 1.44–4.24)
POST FET 100: 6.47 SEC
POST FEV1 FVC: 71.83 % (ref 67.2–89.6)
POST FEV1: 1.18 L (ref 1.54–2.72)
POST FEV5: 1.01 L (ref 0.99–2.7)
POST FVC: 1.65 L (ref 1.98–3.49)
POST PEF: 5.3 L/S (ref 3.49–7.63)
PRE DLCO: 8.51 ML/(MIN*MMHG) (ref 17.19–28.66)
PRE ERV: 0.27 L (ref -16449.24–16450.76)
PRE FEF 25 75: 0.93 L/S (ref 1.44–4.24)
PRE FET 100: 6.34 SEC
PRE FEV05 REF: 53.6 %
PRE FEV1 FVC: 77.08 % (ref 67.2–89.6)
PRE FEV1: 1.14 L (ref 1.54–2.72)
PRE FEV5: 0.99 L (ref 0.99–2.7)
PRE FRC PL: 2.25 L (ref 1.94–3.58)
PRE FVC: 1.47 L (ref 1.98–3.49)
PRE IC: 1.47 L (ref -16447.86–16452.14)
PRE PEF: 5.22 L/S (ref 3.49–7.63)
PRE REF IC: 68.9 %
PRE RV: 1.99 L (ref 1.42–2.57)
PRE TLC: 3.73 L (ref 4.12–6.09)
RAW PRE REF: 239 %
RAW PRE: 7.31 CMH2O*S/L (ref 3.06–3.06)
RAW REF: 3.06
REF IC: 2.14
RV LLN: 1.42
RV PRE REF: 99.4 %
RV REF: 2
RVTLC LLN: 31
RVTLC PRE REF: 131.9 %
RVTLC PRE: 53.25 % (ref 30.79–49.97)
RVTLC REF: 40
RVTLCULN: 50
RVULN: 2.57
SGAW PRE REF: 48.5 %
SGAW PRE: 0.05 1/(CMH2O*S) (ref 0.1–0.1)
SGAW REF: 0.1
TLC LLN: 4.12
TLC PRE REF: 73 %
TLC REF: 5.11
TLC ULN: 6.09
TLCZSCORE: -2.3
ULN IC: ABNORMAL
VA PRE: 2.95 L (ref 4.96–4.96)
VA SINGLE BREATH LLN: 4.96
VA SINGLE BREATH PRE REF: 59.5 %
VA SINGLE BREATH REF: 4.96
VASINGLEBREATHULN: 4.96
VC LLN: 1.98
VC PRE REF: 64.2 %
VC PRE: 1.74 L (ref 1.98–3.49)
VC REF: 2.71
VC ULN: 3.49

## 2025-05-21 PROCEDURE — 99999 PR PBB SHADOW E&M-EST. PATIENT-LVL III: CPT | Mod: PBBFAC,,, | Performed by: STUDENT IN AN ORGANIZED HEALTH CARE EDUCATION/TRAINING PROGRAM

## 2025-05-21 RX ORDER — BUDESONIDE AND FORMOTEROL FUMARATE DIHYDRATE 160; 4.5 UG/1; UG/1
2 AEROSOL RESPIRATORY (INHALATION) EVERY 12 HOURS
Qty: 10.2 G | Refills: 9 | Status: SHIPPED | OUTPATIENT
Start: 2025-05-21 | End: 2026-05-21

## 2025-05-21 NOTE — ASSESSMENT & PLAN NOTE
- defer additional workup given resolution of hemoptysis.  Discussed with patient the need for IR evaluation were to recur.  Patient is in agreement with monitoring for now

## 2025-05-21 NOTE — PROGRESS NOTES
"Subjective:     Reason for visit:  Coughing up blood    Patient ID:  Cydney Salazar is a 65 y.o. female with HTN, KAREY and sarcoidosis    Recurrent episodes of hemoptysis in June of 2016.  CTA showing AVM in the RML s/p embolization without any further issues of hemoptysis.  Repeat angiogram with IR showing no residual pulmonary AVM    Sarcoid diagnosed 20 years ago at [facility].  Prednisone for about 11 years but no additional therapy.      Interval History:  No further episodes of hemoptysis.  Does have some shortness of breath with exertion but this has been relatively stable    Additional Pulmonary History:  Childhood Illnesses:  none  Occupational:  Works at [employer] for past 40 years  Environmental:  cats  Tobacco/Smoking:  Never    Objective:     Vitals:    05/21/25 1023   BP: (!) 149/74   BP Location: Left arm   Patient Position: Sitting   Pulse: 67   SpO2: 96%   Weight: 98.4 kg (217 lb)   Height: 5' 5" (1.651 m)           Physical Exam  Vitals and nursing note reviewed.   Constitutional:       General: She is not in acute distress.     Appearance: She is obese. She is not ill-appearing, toxic-appearing or diaphoretic.   HENT:      Head: Normocephalic and atraumatic.      Nose: No rhinorrhea.      Mouth/Throat:      Mouth: Mucous membranes are moist.   Eyes:      General: No scleral icterus.     Extraocular Movements: Extraocular movements intact.   Cardiovascular:      Rate and Rhythm: Normal rate and regular rhythm.   Pulmonary:      Effort: Pulmonary effort is normal. No tachypnea, accessory muscle usage, respiratory distress or retractions.   Abdominal:      General: There is no distension.   Skin:     General: Skin is warm and dry.      Coloration: Skin is not jaundiced.      Findings: No rash.   Neurological:      General: No focal deficit present.      Mental Status: Mental status is at baseline.          Personal Diagnostic Review and Interpretation  05/14/2025 CTA chest:  No acute PE.  Prior " embolization material in the RML; additional tubular opacity, unchanged in the RML; mosaic attenuation bilaterally with calcified and noncalcified micro nodules, stable    01/29/2025 CT chest:  Biapical scarring with mosaic attenuation with scattered nodules bilaterally; RML embolization material consistent with previous AVM treatment; stable tubular opacity in the periphery of the RML since 2018; unchanged increased soft tissue fullness around the bronchus intermedius since 2019 12/14/2016 CTA chest:  1 cm soft tissue collection in the RML with hypertrophy draining veins and hypertrophied supplying arteries concerning for AVM.      Pertinent Studies Reviewed & Interpreted:     Pulmonary Function Tests:   05/21/2025:  FEV1 53, FVC 54, FEV1/FVC 77, FEF 33.  TLC 73, RV/.  Raw 239.  DLCO 37    06/30/2021:  FEV1 55, FVC 61, FEV1/FVC 73, FEF 35; 8% at 111 cc FEV1, but 24% change in mid flows.  TLC 64, ERV 8, RV/.  DLCO 93    04/16/2019: FEV1 58, FVC 64, FEV1/FVC 73, FEF 42; no bronchodilator response.  TLC 65, ERV 5, RV/.  DLCO 60    05/09/2018:  FEV1 64, FVC 67, FEV1/FVC 78, FEF 62; no bronchodilator response.  TLC 64, ERV 7, RV/TLC 97.  DLCO 77      6 Minute Walk Tests:   05/21/2025: 397 m.  89% on room air at peak exercise.  Appropriate tachycardia and HTN    06/30/2021: 457 m (304 m).  90% on room air at peak exercise.  Appropriate tachycardia but grossly hypertensive throughout ()    04/16/2019: 406 m.  90% on room air at peak exercise.  Appropriate tachycardia but hypertensive at peak exercise ()      Echocardiograms:   04/30/2025:  EF 60-65% with G2 DD and concentric LVH, JOSHUA mod 38; PASP 39    01/28/2019: EF 60%, JOSHUA 34; PASP 60.  No shunt on agitated saline      Other Pertinent Laboratories:  [facility] 100-300      Assessment & Plan:       Problem List Items Addressed This Visit          Pulmonary    Sarcoidosis of lung - Primary    Overview   Sarcoid diagnosed 20 years ago at  [facility].  Prednisone for about 11 years but no additional therapy.    TLC improved over time, now 73.  FEV1 relatively stable since 2021.  DLCO variable over time, but now 37.    Repeat CT chest with stable appearing linear areas of scarring, mosaic attenuation and bilateral calcified noncalcified nodules         Current Assessment & Plan   FEV1 relatively stable since 2019.  Still with severe reduction in mid flows, now with some gas trapping and high airways resistance which likely explains mosaicism on CT.  Not sure why DLCO has dropped given relatively normal PFTs, stable CT and echocardiogram with improved PASP  - starting trial of Symbicort for small airways disease which have had 24% reversibility in the past and can be a manifestation of sarcoidosis         Relevant Medications    budesonide-formoterol 160-4.5 mcg (SYMBICORT) 160-4.5 mcg/actuation HFAA       Cardiac/Vascular    Pulmonary arteriovenous malformation    Overview   Recurrent episodes of hemoptysis in June of 2016.  CTA showing AVM in the RML s/p embolization without any further issues of hemoptysis.  Repeat angiogram with IR showing no residual pulmonary AVM.    January hemoptysis has resolved.  Repeat CTA confirming additional RML AVM           Current Assessment & Plan   - defer additional workup given resolution of hemoptysis.  Discussed with patient the need for IR evaluation were to recur.  Patient is in agreement with monitoring for now            Endocrine    Severe obesity (BMI 35.0-35.9 with comorbidity)    Overview   BMI 36.11.  Certainly contributing to her shortness of breath.           Current Assessment & Plan   - Counseled extensively on the need for weight loss  - counseled on need for regular exercise regimen.  Discussed specific strategies.             RETURN TO CLINIC IN 3 MONTHS       Portions of the record may have been created with voice-recognition software. Occasional wrong-word or sound-a-like substitutions may have  occurred due to the inherent limitations of voice-recognition software. Read the chart carefully and recognize, using context, where substitutions have occurred.

## 2025-05-21 NOTE — PROCEDURES
Cydney Salazar is a 65 y.o.   female patient, who presents for a 6 minute walk test ordered by MD Wing.  The diagnosis is Sarcoidosis.  The patient's BMI is 36.1 kg/m2.  Predicted distance (lower limit of normal) is 285.45 meters.      Test Results:    The test was completed without stopping.  The total time walked was 360 seconds.  During walking, the patient reported:  No complaints.  The patient used no assistive devices during testing.     05/21/2025---------Distance: 396.24 meters (1300 feet)     O2 Sat % Supplemental Oxygen Heart Rate Blood Pressure Patrick Scale   Pre-exercise  (Resting) 95 % Room Air 67 bpm 147/73 mmHg 0   During Exercise 89 % Room Air 82 bpm 178/71 mmHg 4   Post-exercise  (Recovery) 96 % Room Air  67 bpm 149/74 mmHg      Recovery Time: 225 seconds    Performing nurse/tech: Ron MCLAUGHLIN      PREVIOUS STUDY:   06/30/2021---------Distance: 457.2 meters (1500 feet)       O2 Sat % Supplemental Oxygen Heart Rate Blood Pressure Patrick Scale   Pre-exercise  (Resting) 98 % Room Air 63 bpm 183/78 mmHg 0   During Exercise 90 % Room Air 91 bpm 216/96 mmHg 5-6   Post-exercise  (Recovery) 98 % Room Air  68 bpm 186/79 mmHg         CLINICAL INTERPRETATION:  Six minute walk distance is 396.24 meters (1300 feet) with somewhat heavy dyspnea.  During exercise, there was significant desaturation while breathing room air.  Blood pressure increased significantly and Heart rate remained stable with walking.  The patient did not report non-pulmonary symptoms during exercise.  Since the previous study in June 2021, exercise capacity may be somewhat worse.  Based upon age and body mass index, exercise capacity is normal.

## 2025-05-21 NOTE — ASSESSMENT & PLAN NOTE
FEV1 relatively stable since 2019.  Still with severe reduction in mid flows, now with some gas trapping and high airways resistance which likely explains mosaicism on CT.  Not sure why DLCO has dropped given relatively normal PFTs, stable CT and echocardiogram with improved PASP  - starting trial of Symbicort for small airways disease which have had 24% reversibility in the past and can be a manifestation of sarcoidosis

## 2025-05-28 ENCOUNTER — HOSPITAL ENCOUNTER (OUTPATIENT)
Dept: VASCULAR SURGERY | Facility: CLINIC | Age: 65
Discharge: HOME | End: 2025-05-28
Attending: SURGERY
Payer: COMMERCIAL

## 2025-05-28 ENCOUNTER — INITIAL CONSULT (OUTPATIENT)
Dept: VASCULAR SURGERY | Facility: CLINIC | Age: 65
End: 2025-05-28
Attending: SURGERY
Payer: COMMERCIAL

## 2025-05-28 ENCOUNTER — LAB VISIT (OUTPATIENT)
Dept: LAB | Facility: HOSPITAL | Age: 65
End: 2025-05-28
Payer: COMMERCIAL

## 2025-05-28 VITALS
SYSTOLIC BLOOD PRESSURE: 163 MMHG | TEMPERATURE: 98 F | DIASTOLIC BLOOD PRESSURE: 76 MMHG | WEIGHT: 224.88 LBS | BODY MASS INDEX: 37.47 KG/M2 | HEART RATE: 64 BPM | HEIGHT: 65 IN

## 2025-05-28 DIAGNOSIS — I70.1 RENAL ARTERY STENOSIS: ICD-10-CM

## 2025-05-28 DIAGNOSIS — I10 ESSENTIAL HYPERTENSION: Primary | ICD-10-CM

## 2025-05-28 DIAGNOSIS — I70.1 RENAL ARTERY STENOSIS: Primary | ICD-10-CM

## 2025-05-28 DIAGNOSIS — I70.1 RAS (RENAL ARTERY STENOSIS): ICD-10-CM

## 2025-05-28 PROCEDURE — 99999 PR PBB SHADOW E&M-EST. PATIENT-LVL IV: CPT | Mod: PBBFAC,,, | Performed by: SURGERY

## 2025-05-28 PROCEDURE — 83835 ASSAY OF METANEPHRINES: CPT

## 2025-05-28 PROCEDURE — 99203 OFFICE O/P NEW LOW 30 MIN: CPT | Mod: S$GLB,,, | Performed by: SURGERY

## 2025-05-28 PROCEDURE — 93975 VASCULAR STUDY: CPT | Mod: S$GLB,,, | Performed by: SURGERY

## 2025-06-03 LAB
CREAT UR-MCNC: 93 MG/DL (ref 16–326)
METANEPH/CREAT UR: 66 MCG/G CR
METANEPHS/CREAT UR: 427 MCG/G CR
NORMETANEPHRINE/CREAT UR: 361 MCG/G CR

## 2025-06-18 DIAGNOSIS — I10 HTN, GOAL BELOW 130/80: ICD-10-CM

## 2025-06-18 RX ORDER — HYDRALAZINE HYDROCHLORIDE 100 MG/1
100 TABLET, FILM COATED ORAL 3 TIMES DAILY
Qty: 270 TABLET | Refills: 1 | Status: SHIPPED | OUTPATIENT
Start: 2025-06-18

## 2025-06-18 NOTE — TELEPHONE ENCOUNTER
Refill Routing Note   Medication(s) are not appropriate for processing by [facility] for the following reason(s):        Required vitals abnormal    [facility] action(s):  Defer               Appointments  past 12m or future 3m with PCP    Date Provider   Last Visit   2/19/2025 Jasiel Middleton, DO   Next Visit   8/20/2025 Jasiel Middleton, DO   ED visits in past 90 days: 0        Note composed:12:35 PM 06/18/2025

## 2025-06-18 NOTE — TELEPHONE ENCOUNTER
No care due was identified.  [facility] Embedded Care Due Messages. Reference number: [id number].   6/18/2025 11:16:26 AM CDT

## 2025-06-18 NOTE — TELEPHONE ENCOUNTER
Copied from CRM #[id number]. Topic: Medications - Medication Refill  >> Jun 18, 2025 11:03 AM Nicky wrote:  Requesting an RX refill or new RX.    Is this a refill or new RX: refill    RX name and strength (copy/paste from chart):  hydrALAZINE (APRESOLINE) 100 MG tablet    Is this a 30 day or 90 day RX: 90    Pharmacy name and phone # (copy/paste from chart):    [id number] - [place] [address] AT [facility] [address]  [address]  [facility]  [facility] [address]  Phone: [phone] Fax: [phone]         Who called and call back number:pt [phone]    The doctors have asked that we provide their patients with the following 2 reminders -- prescription refills can take up to 72 hours, and a friendly reminder that in the future you can use your [facility] account to request refills: yes

## 2025-06-30 ENCOUNTER — TELEPHONE (OUTPATIENT)
Dept: PULMONOLOGY | Facility: CLINIC | Age: 65
End: 2025-06-30
Payer: COMMERCIAL

## 2025-06-30 NOTE — TELEPHONE ENCOUNTER
Call was returned to patient in regards to scheduling a 3 month follow up. I informed patient that Dr. Dimas schedule is not available at moment. Patient states she can only be schedule on a Wednesday. A recall was placed. Patient verbalized understanding.

## 2025-06-30 NOTE — TELEPHONE ENCOUNTER
Copied from CRM #[id number]. Topic: Appointments - Appointment Scheduling  >> Jun 30, 2025 10:03 AM Bebe wrote:  Type:  Sooner Appointment Request    Caller is requesting a sooner appointment.    Name of Caller:Ms Salazar   When is the first available appointment?none   Would the patient rather a call back or a response via [facility]? Call   Best Call Back Number:[phone]  Additional Information: she needs to schedule her 3 month follow up appt please call

## 2025-08-13 ENCOUNTER — TELEPHONE (OUTPATIENT)
Dept: INTERNAL MEDICINE | Facility: CLINIC | Age: 65
End: 2025-08-13
Payer: COMMERCIAL